# Patient Record
Sex: FEMALE | Race: BLACK OR AFRICAN AMERICAN | NOT HISPANIC OR LATINO
[De-identification: names, ages, dates, MRNs, and addresses within clinical notes are randomized per-mention and may not be internally consistent; named-entity substitution may affect disease eponyms.]

---

## 2017-01-09 ENCOUNTER — RECORD ABSTRACTING (OUTPATIENT)
Age: 62
End: 2017-01-09

## 2017-01-09 DIAGNOSIS — Z78.9 OTHER SPECIFIED HEALTH STATUS: ICD-10-CM

## 2017-01-17 ENCOUNTER — FORM ENCOUNTER (OUTPATIENT)
Age: 62
End: 2017-01-17

## 2017-01-18 ENCOUNTER — OTHER (OUTPATIENT)
Age: 62
End: 2017-01-18

## 2017-01-18 ENCOUNTER — APPOINTMENT (OUTPATIENT)
Dept: INTERNAL MEDICINE | Facility: CLINIC | Age: 62
End: 2017-01-18

## 2017-01-18 VITALS
HEART RATE: 61 BPM | TEMPERATURE: 96.7 F | WEIGHT: 203 LBS | HEIGHT: 63 IN | BODY MASS INDEX: 35.97 KG/M2 | DIASTOLIC BLOOD PRESSURE: 89 MMHG | RESPIRATION RATE: 17 BRPM | SYSTOLIC BLOOD PRESSURE: 138 MMHG

## 2017-01-19 LAB
ALBUMIN SERPL-MCNC: 3.8 G/DL
ALBUMIN/GLOB SERPL: 1.27
ALP SERPL-CCNC: 69 IU/L
ALT SERPL-CCNC: 12 IU/L
ANION GAP SERPL CALC-SCNC: 7 MEQ/L
AST SERPL-CCNC: 21 IU/L
BASOPHILS # BLD: 0.02 TH/MM3
BASOPHILS NFR BLD: 0.5 %
BILIRUB SERPL-MCNC: 0.6 MG/DL
BUN SERPL-MCNC: 15 MG/DL
BUN/CREAT SERPL: 19 %
CALCIUM SERPL-MCNC: 9.3 MG/DL
CHLORIDE SERPL-SCNC: 104 MEQ/L
CHOLEST SERPL-MCNC: 256 MG/DL
CO2 SERPL-SCNC: 28 MEQ/L
CREAT SERPL-MCNC: 0.79 MG/DL
EOSINOPHIL # BLD: 0.06 TH/MM3
EOSINOPHIL NFR BLD: 1.4 %
ERYTHROCYTE [DISTWIDTH] IN BLOOD BY AUTOMATED COUNT: 17.3 %
ESTIMATED AVERGAGE GLUCOSE (NORTH): 134 MG/DL
GFR SERPL CREATININE-BSD FRML MDRD: 89
GLUCOSE SERPL-MCNC: 90 MG/DL
GRANULOCYTES # BLD: 1.32 TH/MM3
GRANULOCYTES NFR BLD: 31.2 %
HBA1C MFR BLD: 6.3 %
HCT VFR BLD AUTO: 35.3 %
HDLC SERPL-MCNC: 55 MG/DL
HDLC SERPL: 4.65
HGB BLD-MCNC: 10.5 G/DL
IMM GRANULOCYTES # BLD: 0.01 TH/MM3
IMM GRANULOCYTES NFR BLD: 0.2 %
LDLC SERPL DIRECT ASSAY-MCNC: 176 MG/DL
LYMPHOCYTES # BLD: 2.4 TH/MM3
LYMPHOCYTES NFR BLD: 56.6 %
MCH RBC QN AUTO: 21.1 PG
MCHC RBC AUTO-ENTMCNC: 29.7 G/DL
MCV RBC AUTO: 70.9 FL
MONOCYTES # BLD: 0.43 TH/MM3
MONOCYTES NFR BLD: 10.1 %
PLATELET # BLD: 259 TH/MM3
PMV BLD AUTO: 12 FL
POTASSIUM SERPL-SCNC: 4.6 MMOL/L
PROT SERPL-MCNC: 6.8 G/DL
RBC # BLD AUTO: 4.98 MIL/MM3
SODIUM SERPL-SCNC: 139 MEQ/L
TRIGL SERPL-MCNC: 78 MG/DL
VLDLC SERPL-MCNC: 15 MG/DL
WBC # BLD: 4.24 TH/MM3

## 2017-01-23 VITALS — WEIGHT: 198 LBS | BODY MASS INDEX: 35.07 KG/M2

## 2017-01-25 ENCOUNTER — CLINICAL ADVICE (OUTPATIENT)
Age: 62
End: 2017-01-25

## 2017-01-30 ENCOUNTER — FORM ENCOUNTER (OUTPATIENT)
Age: 62
End: 2017-01-30

## 2017-02-17 ENCOUNTER — OUTPATIENT (OUTPATIENT)
Dept: OUTPATIENT SERVICES | Facility: HOSPITAL | Age: 62
LOS: 1 days | Discharge: HOME | End: 2017-02-17

## 2017-02-17 ENCOUNTER — APPOINTMENT (OUTPATIENT)
Dept: OBGYN | Facility: CLINIC | Age: 62
End: 2017-02-17

## 2017-02-17 VITALS
HEIGHT: 61 IN | SYSTOLIC BLOOD PRESSURE: 134 MMHG | DIASTOLIC BLOOD PRESSURE: 90 MMHG | BODY MASS INDEX: 38.14 KG/M2 | WEIGHT: 202 LBS

## 2017-02-21 ENCOUNTER — APPOINTMENT (OUTPATIENT)
Dept: INTERNAL MEDICINE | Facility: CLINIC | Age: 62
End: 2017-02-21

## 2017-02-28 ENCOUNTER — APPOINTMENT (OUTPATIENT)
Dept: ANTEPARTUM | Facility: CLINIC | Age: 62
End: 2017-02-28

## 2017-03-07 ENCOUNTER — APPOINTMENT (OUTPATIENT)
Dept: OBGYN | Facility: CLINIC | Age: 62
End: 2017-03-07

## 2017-03-07 VITALS
DIASTOLIC BLOOD PRESSURE: 68 MMHG | HEIGHT: 60 IN | BODY MASS INDEX: 39.46 KG/M2 | WEIGHT: 201 LBS | SYSTOLIC BLOOD PRESSURE: 118 MMHG

## 2017-03-12 LAB
A VAGINAE DNA VAG NAA+PROBE-LOG#: NOT DETECTED
BV SCORE VAG QL NAA+PROBE: NORMAL
C GLABRATA DNA VAG QL NAA+PROBE: NOT DETECTED
C PARAP DNA VAG QL NAA+PROBE: NOT DETECTED
C TRACH RRNA SPEC QL NAA+PROBE: NOT DETECTED
C TROPICLS DNA VAG QL NAA+PROBE: DETECTED
CANDIDA DNA VAG QL NAA+PROBE: NOT DETECTED
G VAGINALIS DNA VAG NAA+PROBE-LOG#: NOT DETECTED
LACTOBACILLUS DNA VAG NAA+PROBE-LOG#: NOT DETECTED
MEGASPHAERA SP DNA VAG NAA+PROBE-LOG#: NOT DETECTED
N GONORRHOEA RRNA SPEC QL NAA+PROBE: NOT DETECTED
T VAGINALIS RRNA SPEC QL NAA+PROBE: NOT DETECTED

## 2017-04-25 ENCOUNTER — APPOINTMENT (OUTPATIENT)
Dept: NUTRITION | Facility: CLINIC | Age: 62
End: 2017-04-25

## 2017-06-02 ENCOUNTER — APPOINTMENT (OUTPATIENT)
Dept: UROGYNECOLOGY | Facility: CLINIC | Age: 62
End: 2017-06-02

## 2017-06-02 ENCOUNTER — OUTPATIENT (OUTPATIENT)
Dept: OUTPATIENT SERVICES | Facility: HOSPITAL | Age: 62
LOS: 1 days | Discharge: HOME | End: 2017-06-02

## 2017-06-02 VITALS
DIASTOLIC BLOOD PRESSURE: 86 MMHG | BODY MASS INDEX: 37.95 KG/M2 | SYSTOLIC BLOOD PRESSURE: 140 MMHG | HEIGHT: 61 IN | WEIGHT: 201 LBS

## 2017-06-02 DIAGNOSIS — E66.9 OBESITY, UNSPECIFIED: ICD-10-CM

## 2017-06-02 DIAGNOSIS — N81.6 RECTOCELE: ICD-10-CM

## 2017-06-02 DIAGNOSIS — N81.10 CYSTOCELE, UNSPECIFIED: ICD-10-CM

## 2017-06-02 DIAGNOSIS — M79.1 MYALGIA: ICD-10-CM

## 2017-06-02 DIAGNOSIS — R39.14 FEELING OF INCOMPLETE BLADDER EMPTYING: ICD-10-CM

## 2017-06-02 DIAGNOSIS — R35.1 NOCTURIA: ICD-10-CM

## 2017-06-02 DIAGNOSIS — M48.00 SPINAL STENOSIS, SITE UNSPECIFIED: ICD-10-CM

## 2017-06-02 DIAGNOSIS — R33.9 RETENTION OF URINE, UNSPECIFIED: ICD-10-CM

## 2017-06-02 LAB — CYTOLOGY CVX/VAG DOC THIN PREP: NORMAL

## 2017-06-06 LAB
APPEARANCE UR: CLEAR
BACTERIA UR CULT: NORMAL
BILIRUB UR QL STRIP: NEGATIVE
COLOR UR: YELLOW
GLUCOSE UR STRIP-MCNC: NEGATIVE MG/DL
HGB UR QL STRIP: NEGATIVE
KETONES UR STRIP-MCNC: NEGATIVE MG/DL
NITRITE UR QL STRIP: NEGATIVE
PH UR STRIP: 7
PROT UR STRIP-MCNC: NEGATIVE MG/DL
SP GR UR STRIP: 1.01
UROBILINOGEN UR STRIP-MCNC: 0.2 MG/DL
WBC URNS QL MICRO: NEGATIVE

## 2017-06-08 LAB
C TRACH RRNA SPEC QL NAA+PROBE: NOT DETECTED
N GONORRHOEA RRNA SPEC QL NAA+PROBE: NOT DETECTED

## 2017-07-05 ENCOUNTER — OTHER (OUTPATIENT)
Age: 62
End: 2017-07-05

## 2017-07-13 ENCOUNTER — OUTPATIENT (OUTPATIENT)
Dept: OUTPATIENT SERVICES | Facility: HOSPITAL | Age: 62
LOS: 1 days | Discharge: HOME | End: 2017-07-13

## 2017-07-13 DIAGNOSIS — R33.9 RETENTION OF URINE, UNSPECIFIED: ICD-10-CM

## 2017-07-15 ENCOUNTER — INPATIENT (INPATIENT)
Facility: HOSPITAL | Age: 62
LOS: 0 days | Discharge: HOME | End: 2017-07-16
Attending: EMERGENCY MEDICINE

## 2017-07-15 DIAGNOSIS — E66.9 OBESITY, UNSPECIFIED: ICD-10-CM

## 2017-07-15 DIAGNOSIS — E78.5 HYPERLIPIDEMIA, UNSPECIFIED: ICD-10-CM

## 2017-07-15 DIAGNOSIS — M54.9 DORSALGIA, UNSPECIFIED: ICD-10-CM

## 2017-07-15 DIAGNOSIS — G89.29 OTHER CHRONIC PAIN: ICD-10-CM

## 2017-07-15 DIAGNOSIS — R07.9 CHEST PAIN, UNSPECIFIED: ICD-10-CM

## 2017-07-15 DIAGNOSIS — K59.00 CONSTIPATION, UNSPECIFIED: ICD-10-CM

## 2017-07-15 DIAGNOSIS — R07.89 OTHER CHEST PAIN: ICD-10-CM

## 2017-07-15 DIAGNOSIS — D64.9 ANEMIA, UNSPECIFIED: ICD-10-CM

## 2017-07-15 DIAGNOSIS — I10 ESSENTIAL (PRIMARY) HYPERTENSION: ICD-10-CM

## 2017-07-26 ENCOUNTER — OUTPATIENT (OUTPATIENT)
Dept: OUTPATIENT SERVICES | Facility: HOSPITAL | Age: 62
LOS: 1 days | Discharge: HOME | End: 2017-07-26

## 2017-07-26 ENCOUNTER — APPOINTMENT (OUTPATIENT)
Dept: UROGYNECOLOGY | Facility: CLINIC | Age: 62
End: 2017-07-26

## 2017-07-26 ENCOUNTER — RESULT REVIEW (OUTPATIENT)
Age: 62
End: 2017-07-26

## 2017-07-26 VITALS
BODY MASS INDEX: 37.95 KG/M2 | SYSTOLIC BLOOD PRESSURE: 138 MMHG | WEIGHT: 201 LBS | HEIGHT: 61 IN | DIASTOLIC BLOOD PRESSURE: 78 MMHG

## 2017-07-26 RX ORDER — PREGABALIN 150 MG/1
150 CAPSULE ORAL
Refills: 0 | Status: DISCONTINUED | COMMUNITY
End: 2017-07-26

## 2017-07-26 RX ORDER — PREGABALIN 150 MG/1
150 CAPSULE ORAL 3 TIMES DAILY
Qty: 90 | Refills: 0 | Status: DISCONTINUED | COMMUNITY
Start: 2017-01-18 | End: 2017-07-26

## 2017-08-02 LAB
APPEARANCE UR: CLEAR
BACTERIA UR CULT: NORMAL
BILIRUB UR QL STRIP: NEGATIVE
COLOR UR: YELLOW
GLUCOSE UR STRIP-MCNC: NEGATIVE MG/DL
HGB UR QL STRIP: NEGATIVE
KETONES UR STRIP-MCNC: NEGATIVE MG/DL
NITRITE UR QL STRIP: NEGATIVE
PH UR STRIP: 6.5
PROT UR STRIP-MCNC: NEGATIVE MG/DL
SP GR UR STRIP: 1.01
UROBILINOGEN UR STRIP-MCNC: 0.2 MG/DL
WBC URNS QL MICRO: NEGATIVE

## 2017-08-03 DIAGNOSIS — N81.10 CYSTOCELE, UNSPECIFIED: ICD-10-CM

## 2017-08-03 DIAGNOSIS — R10.2 PELVIC AND PERINEAL PAIN: ICD-10-CM

## 2017-08-03 DIAGNOSIS — M79.1 MYALGIA: ICD-10-CM

## 2017-08-03 DIAGNOSIS — R39.14 FEELING OF INCOMPLETE BLADDER EMPTYING: ICD-10-CM

## 2017-08-03 DIAGNOSIS — N81.6 RECTOCELE: ICD-10-CM

## 2018-01-02 ENCOUNTER — APPOINTMENT (OUTPATIENT)
Dept: OBGYN | Facility: CLINIC | Age: 63
End: 2018-01-02

## 2018-01-02 ENCOUNTER — OUTPATIENT (OUTPATIENT)
Dept: OUTPATIENT SERVICES | Facility: HOSPITAL | Age: 63
LOS: 1 days | Discharge: HOME | End: 2018-01-02

## 2018-01-02 VITALS
SYSTOLIC BLOOD PRESSURE: 120 MMHG | BODY MASS INDEX: 38.54 KG/M2 | DIASTOLIC BLOOD PRESSURE: 78 MMHG | HEIGHT: 60 IN | WEIGHT: 196.3 LBS

## 2018-01-10 ENCOUNTER — OUTPATIENT (OUTPATIENT)
Dept: OUTPATIENT SERVICES | Facility: HOSPITAL | Age: 63
LOS: 1 days | Discharge: HOME | End: 2018-01-10

## 2018-01-10 ENCOUNTER — APPOINTMENT (OUTPATIENT)
Dept: INTERNAL MEDICINE | Facility: CLINIC | Age: 63
End: 2018-01-10

## 2018-01-10 VITALS
WEIGHT: 194 LBS | HEART RATE: 70 BPM | DIASTOLIC BLOOD PRESSURE: 80 MMHG | SYSTOLIC BLOOD PRESSURE: 136 MMHG | HEIGHT: 60 IN | BODY MASS INDEX: 38.09 KG/M2

## 2018-01-12 LAB
ANA TITR SER: NEGATIVE
BASOPHILS # BLD: 0.01 TH/MM3
BASOPHILS NFR BLD: 0.3 %
CHOLEST SERPL-MCNC: 231 MG/DL
CRP SERPL-MCNC: 1.8 MG/DL
DIFFERENTIAL METHOD BLD: NORMAL
EOSINOPHIL # BLD: 0.07 TH/MM3
EOSINOPHIL NFR BLD: 2 %
ERYTHROCYTE [DISTWIDTH] IN BLOOD BY AUTOMATED COUNT: 16.7 %
ERYTHROCYTE [SEDIMENTATION RATE] IN BLOOD: 33 MM/HR
ESTIMATED AVERGAGE GLUCOSE (NORTH): 128 MG/DL
FERRITIN SERPL-MCNC: 55 NG/ML
GRANULOCYTES # BLD: 1.05 TH/MM3
GRANULOCYTES NFR BLD: 30.6 %
HBA1C MFR BLD: 6.1 %
HCT VFR BLD AUTO: 35.7 %
HDLC SERPL-MCNC: 53 MG/DL
HDLC SERPL: 4.36
HGB BLD-MCNC: 11 G/DL
IMM GRANULOCYTES # BLD: 0 TH/MM3
IMM GRANULOCYTES NFR BLD: 0 %
IRON SERPL-MCNC: 68 UG/DL
LDLC SERPL DIRECT ASSAY-MCNC: 162 MG/DL
LYMPHOCYTES # BLD: 2.04 TH/MM3
LYMPHOCYTES NFR BLD: 59.3 %
MCH RBC QN AUTO: 21.9 PG
MCHC RBC AUTO-ENTMCNC: 30.8 G/DL
MCV RBC AUTO: 71.1 FL
MONOCYTES # BLD: 0.27 TH/MM3
MONOCYTES NFR BLD: 7.8 %
PLATELET # BLD: 257 TH/MM3
PMV BLD AUTO: 11.7 FL
RBC # BLD AUTO: 5.02 MIL/MM3
RHEUMATOID FACT SERPL-ACNC: < 14 IU/ML
TIBC SERPL-MCNC: 336 UG/DL
TRIGL SERPL-MCNC: 61 MG/DL
VLDLC SERPL-MCNC: 12 MG/DL
WBC # BLD: 3.44 TH/MM3

## 2018-01-26 ENCOUNTER — OUTPATIENT (OUTPATIENT)
Dept: OUTPATIENT SERVICES | Facility: HOSPITAL | Age: 63
LOS: 1 days | Discharge: HOME | End: 2018-01-26

## 2018-01-30 ENCOUNTER — APPOINTMENT (OUTPATIENT)
Dept: NUTRITION | Facility: CLINIC | Age: 63
End: 2018-01-30

## 2018-02-02 ENCOUNTER — OUTPATIENT (OUTPATIENT)
Dept: OUTPATIENT SERVICES | Facility: HOSPITAL | Age: 63
LOS: 1 days | Discharge: HOME | End: 2018-02-02

## 2018-02-02 DIAGNOSIS — Z12.31 ENCOUNTER FOR SCREENING MAMMOGRAM FOR MALIGNANT NEOPLASM OF BREAST: ICD-10-CM

## 2018-02-07 ENCOUNTER — OUTPATIENT (OUTPATIENT)
Dept: OUTPATIENT SERVICES | Facility: HOSPITAL | Age: 63
LOS: 1 days | Discharge: HOME | End: 2018-02-07

## 2018-02-07 DIAGNOSIS — R92.8 OTHER ABNORMAL AND INCONCLUSIVE FINDINGS ON DIAGNOSTIC IMAGING OF BREAST: ICD-10-CM

## 2018-02-20 DIAGNOSIS — R10.2 PELVIC AND PERINEAL PAIN: ICD-10-CM

## 2018-03-20 DIAGNOSIS — M48.061 SPINAL STENOSIS, LUMBAR REGION WITHOUT NEUROGENIC CLAUDICATION: ICD-10-CM

## 2018-07-25 ENCOUNTER — APPOINTMENT (OUTPATIENT)
Dept: OBGYN | Facility: CLINIC | Age: 63
End: 2018-07-25

## 2018-07-25 ENCOUNTER — OUTPATIENT (OUTPATIENT)
Dept: OUTPATIENT SERVICES | Facility: HOSPITAL | Age: 63
LOS: 1 days | Discharge: HOME | End: 2018-07-25

## 2018-07-25 VITALS
SYSTOLIC BLOOD PRESSURE: 130 MMHG | BODY MASS INDEX: 37.99 KG/M2 | WEIGHT: 193.5 LBS | HEIGHT: 60 IN | DIASTOLIC BLOOD PRESSURE: 86 MMHG

## 2018-07-25 DIAGNOSIS — Z82.49 FAMILY HISTORY OF ISCHEMIC HEART DISEASE AND OTHER DISEASES OF THE CIRCULATORY SYSTEM: ICD-10-CM

## 2018-07-25 RX ORDER — MELOXICAM 15 MG/1
15 TABLET ORAL
Qty: 14 | Refills: 0 | Status: COMPLETED | COMMUNITY
Start: 2018-01-10 | End: 2018-07-25

## 2018-07-26 DIAGNOSIS — N81.6 RECTOCELE: ICD-10-CM

## 2018-07-26 DIAGNOSIS — N81.10 CYSTOCELE, UNSPECIFIED: ICD-10-CM

## 2018-08-03 ENCOUNTER — APPOINTMENT (OUTPATIENT)
Dept: UROGYNECOLOGY | Facility: CLINIC | Age: 63
End: 2018-08-03

## 2018-08-03 ENCOUNTER — OUTPATIENT (OUTPATIENT)
Dept: OUTPATIENT SERVICES | Facility: HOSPITAL | Age: 63
LOS: 1 days | Discharge: HOME | End: 2018-08-03

## 2018-08-03 VITALS
SYSTOLIC BLOOD PRESSURE: 142 MMHG | WEIGHT: 193 LBS | HEIGHT: 60 IN | BODY MASS INDEX: 37.89 KG/M2 | DIASTOLIC BLOOD PRESSURE: 82 MMHG

## 2018-08-03 DIAGNOSIS — N81.10 CYSTOCELE, UNSPECIFIED: ICD-10-CM

## 2018-08-03 DIAGNOSIS — Z87.898 PERSONAL HISTORY OF OTHER SPECIFIED CONDITIONS: ICD-10-CM

## 2018-08-03 DIAGNOSIS — R10.2 PELVIC AND PERINEAL PAIN: ICD-10-CM

## 2018-08-03 DIAGNOSIS — N94.9 UNSPECIFIED CONDITION ASSOCIATED WITH FEMALE GENITAL ORGANS AND MENSTRUAL CYCLE: ICD-10-CM

## 2018-08-03 DIAGNOSIS — R33.9 RETENTION OF URINE, UNSPECIFIED: ICD-10-CM

## 2018-08-10 DIAGNOSIS — N88.9 NONINFLAMMATORY DISORDER OF CERVIX UTERI, UNSPECIFIED: ICD-10-CM

## 2018-08-10 DIAGNOSIS — M79.1 MYALGIA: ICD-10-CM

## 2018-08-10 DIAGNOSIS — K59.00 CONSTIPATION, UNSPECIFIED: ICD-10-CM

## 2018-08-10 DIAGNOSIS — N81.6 RECTOCELE: ICD-10-CM

## 2018-09-14 ENCOUNTER — APPOINTMENT (OUTPATIENT)
Dept: UROGYNECOLOGY | Facility: CLINIC | Age: 63
End: 2018-09-14

## 2018-09-14 ENCOUNTER — OUTPATIENT (OUTPATIENT)
Dept: OUTPATIENT SERVICES | Facility: HOSPITAL | Age: 63
LOS: 1 days | Discharge: HOME | End: 2018-09-14

## 2018-09-14 VITALS — BODY MASS INDEX: 37.69 KG/M2 | DIASTOLIC BLOOD PRESSURE: 78 MMHG | WEIGHT: 193 LBS | SYSTOLIC BLOOD PRESSURE: 128 MMHG

## 2018-09-14 RX ORDER — CONJUGATED ESTROGENS 0.62 MG/G
0.62 CREAM VAGINAL
Qty: 1 | Refills: 0 | Status: DISCONTINUED | COMMUNITY
Start: 2018-01-02 | End: 2018-09-14

## 2018-09-19 DIAGNOSIS — N95.2 POSTMENOPAUSAL ATROPHIC VAGINITIS: ICD-10-CM

## 2018-09-19 DIAGNOSIS — N85.8 OTHER SPECIFIED NONINFLAMMATORY DISORDERS OF UTERUS: ICD-10-CM

## 2018-09-19 DIAGNOSIS — M79.1 MYALGIA: ICD-10-CM

## 2018-09-21 RX ORDER — CONJUGATED ESTROGENS 0.62 MG/G
0.62 CREAM VAGINAL
Qty: 1 | Refills: 3 | Status: DISCONTINUED | COMMUNITY
Start: 2018-09-14 | End: 2018-09-21

## 2018-10-31 ENCOUNTER — OUTPATIENT (OUTPATIENT)
Dept: OUTPATIENT SERVICES | Facility: HOSPITAL | Age: 63
LOS: 1 days | Discharge: HOME | End: 2018-10-31

## 2018-10-31 ENCOUNTER — APPOINTMENT (OUTPATIENT)
Dept: UROGYNECOLOGY | Facility: CLINIC | Age: 63
End: 2018-10-31

## 2018-10-31 VITALS
SYSTOLIC BLOOD PRESSURE: 124 MMHG | HEIGHT: 60 IN | BODY MASS INDEX: 38.28 KG/M2 | WEIGHT: 195 LBS | DIASTOLIC BLOOD PRESSURE: 70 MMHG

## 2018-10-31 DIAGNOSIS — N95.2 POSTMENOPAUSAL ATROPHIC VAGINITIS: ICD-10-CM

## 2018-10-31 DIAGNOSIS — R10.2 PELVIC AND PERINEAL PAIN: ICD-10-CM

## 2018-10-31 DIAGNOSIS — M79.10 MYALGIA, UNSPECIFIED SITE: ICD-10-CM

## 2018-11-19 ENCOUNTER — OUTPATIENT (OUTPATIENT)
Dept: OUTPATIENT SERVICES | Facility: HOSPITAL | Age: 63
LOS: 1 days | Discharge: HOME | End: 2018-11-19

## 2018-11-19 ENCOUNTER — APPOINTMENT (OUTPATIENT)
Dept: INTERNAL MEDICINE | Facility: CLINIC | Age: 63
End: 2018-11-19

## 2018-11-19 VITALS
WEIGHT: 197 LBS | HEART RATE: 94 BPM | HEIGHT: 63 IN | BODY MASS INDEX: 34.91 KG/M2 | DIASTOLIC BLOOD PRESSURE: 79 MMHG | SYSTOLIC BLOOD PRESSURE: 137 MMHG

## 2018-11-19 DIAGNOSIS — R03.0 ELEVATED BLOOD-PRESSURE READING, WITHOUT DIAGNOSIS OF HYPERTENSION: ICD-10-CM

## 2018-11-19 DIAGNOSIS — R73.9 HYPERGLYCEMIA, UNSPECIFIED: ICD-10-CM

## 2018-11-19 DIAGNOSIS — Z23 ENCOUNTER FOR IMMUNIZATION: ICD-10-CM

## 2018-11-19 DIAGNOSIS — M54.5 LOW BACK PAIN: ICD-10-CM

## 2018-11-19 DIAGNOSIS — E66.9 OBESITY, UNSPECIFIED: ICD-10-CM

## 2018-11-19 NOTE — ASSESSMENT
[FreeTextEntry1] : 63 year old female is here for her follow up visit\par \par # Chronic BAck pain \par - Pt takes tylenol\par - c/w  cyclobenzarine\par - Pain management consult \par - Pt received EDSI in that but didn’t help much follow up\par - xray of L/S - moderate lower lumbar degenerative disease with facet arthrosis \par -mri LS\par - Physical therapy \par \par # Chronic joint pain\par - rheumatological work up negative\par \par # Hyperglycemia\par - HBa1C 6.1 in jan 2018- repeat blood work\par \par # HLD and Hypertriglyceridemia - last lipid profile in Jan 2018 - normal lipids\par -repeat  Lipid profile\par \par # Anemia\par - repeat iron studies and cbc\par \par #obesity- nutrition f/u \par # Heathcare maintenance \par - PAp smear 2016\par - mammogram 2017 birads 1 \par -flu shot today \par -colonoscopy 2016 reported normal\par \par # Dysuria \par - Patient had uterine prolapse and pt had pessary that wasn't cleaned so it was removed in the past. \par - f/u gyn \par #elevated BP - monitor BP at home- BP monitor kit sent to pharmacy- keep a diary and rtc 3 months\par #heart burn- GI consult\par protonix in morning\par ranitidine at night \par \par patient requesting home care forms -  consulted \par RTC 1 month

## 2018-11-19 NOTE — PHYSICAL EXAM
[No Acute Distress] : no acute distress [Well Nourished] : well nourished [Normal Sclera/Conjunctiva] : normal sclera/conjunctiva [Normal Outer Ear/Nose] : the outer ears and nose were normal in appearance [No Lymphadenopathy] : no lymphadenopathy [No Respiratory Distress] : no respiratory distress  [Clear to Auscultation] : lungs were clear to auscultation bilaterally [Normal Rate] : normal rate  [Regular Rhythm] : with a regular rhythm [No Edema] : there was no peripheral edema [Soft] : abdomen soft [Non Tender] : non-tender [Normal Bowel Sounds] : normal bowel sounds [No Joint Swelling] : no joint swelling [No Rash] : no rash [No Focal Deficits] : no focal deficits [Normal Affect] : the affect was normal [Supple] : supple [Normal S1, S2] : normal S1 and S2 [Non-distended] : non-distended [de-identified] : spinal tenderness +

## 2018-11-19 NOTE — HISTORY OF PRESENT ILLNESS
[de-identified] : 62 y/o female is here for her follow up visit. Still c/o back pain and urinary frequency .No fever , chills. Also c/o heart burn x couple of months a/w decrease appetite and intermittent episodes of vomiting ,non bloody, non bilious .No significant weight loss

## 2018-11-21 ENCOUNTER — APPOINTMENT (OUTPATIENT)
Dept: ANTEPARTUM | Facility: CLINIC | Age: 63
End: 2018-11-21

## 2018-12-03 ENCOUNTER — APPOINTMENT (OUTPATIENT)
Dept: NUTRITION | Facility: CLINIC | Age: 63
End: 2018-12-03

## 2018-12-03 VITALS — HEIGHT: 63 IN | WEIGHT: 199 LBS | BODY MASS INDEX: 35.26 KG/M2

## 2018-12-04 LAB
ALBUMIN SERPL ELPH-MCNC: 3.8 G/DL
ALP BLD-CCNC: 76 U/L
ALT SERPL-CCNC: 11 U/L
ANION GAP SERPL CALC-SCNC: 12 MMOL/L
AST SERPL-CCNC: 18 U/L
BASOPHILS # BLD AUTO: 0.01 K/UL
BASOPHILS NFR BLD AUTO: 0.2 %
BILIRUB SERPL-MCNC: <0.2 MG/DL
BUN SERPL-MCNC: 11 MG/DL
CALCIUM SERPL-MCNC: 8.8 MG/DL
CHLORIDE SERPL-SCNC: 104 MMOL/L
CHOLEST SERPL-MCNC: 207 MG/DL
CHOLEST/HDLC SERPL: 4.9 RATIO
CO2 SERPL-SCNC: 27 MMOL/L
CREAT SERPL-MCNC: 1 MG/DL
EOSINOPHIL # BLD AUTO: 0.08 K/UL
EOSINOPHIL NFR BLD AUTO: 1.7 %
ESTIMATED AVERAGE GLUCOSE: 117 MG/DL
GLUCOSE SERPL-MCNC: 97 MG/DL
HBA1C MFR BLD HPLC: 5.7 %
HCT VFR BLD CALC: 33.8 %
HDLC SERPL-MCNC: 42 MG/DL
HGB BLD-MCNC: 10 G/DL
IMM GRANULOCYTES NFR BLD AUTO: 0.2 %
LDLC SERPL CALC-MCNC: 158 MG/DL
LYMPHOCYTES # BLD AUTO: 1.94 K/UL
LYMPHOCYTES NFR BLD AUTO: 41 %
MAN DIFF?: NORMAL
MCHC RBC-ENTMCNC: 22.2 PG
MCHC RBC-ENTMCNC: 29.6 G/DL
MCV RBC AUTO: 74.9 FL
MONOCYTES # BLD AUTO: 0.48 K/UL
MONOCYTES NFR BLD AUTO: 10.1 %
NEUTROPHILS # BLD AUTO: 2.21 K/UL
NEUTROPHILS NFR BLD AUTO: 46.8 %
PLATELET # BLD AUTO: 270 K/UL
POTASSIUM SERPL-SCNC: 4.5 MMOL/L
PROT SERPL-MCNC: 6.5 G/DL
RBC # BLD: 4.51 M/UL
RBC # FLD: 16.2 %
SODIUM SERPL-SCNC: 143 MMOL/L
TRIGL SERPL-MCNC: 118 MG/DL
WBC # FLD AUTO: 4.73 K/UL

## 2018-12-12 ENCOUNTER — APPOINTMENT (OUTPATIENT)
Dept: PAIN MANAGEMENT | Facility: CLINIC | Age: 63
End: 2018-12-12

## 2018-12-19 ENCOUNTER — APPOINTMENT (OUTPATIENT)
Dept: INTERNAL MEDICINE | Facility: CLINIC | Age: 63
End: 2018-12-19

## 2018-12-19 ENCOUNTER — OUTPATIENT (OUTPATIENT)
Dept: OUTPATIENT SERVICES | Facility: HOSPITAL | Age: 63
LOS: 1 days | Discharge: HOME | End: 2018-12-19

## 2018-12-19 VITALS
DIASTOLIC BLOOD PRESSURE: 81 MMHG | SYSTOLIC BLOOD PRESSURE: 121 MMHG | HEART RATE: 75 BPM | TEMPERATURE: 96.9 F | WEIGHT: 197 LBS | HEIGHT: 63 IN | BODY MASS INDEX: 34.91 KG/M2

## 2018-12-19 DIAGNOSIS — R03.0 ELEVATED BLOOD-PRESSURE READING, W/OUT DIAGNOSIS OF HYPERTENSION: ICD-10-CM

## 2018-12-19 NOTE — ASSESSMENT
[FreeTextEntry1] : 63 year old female is here for her follow up visit\par \par # Chronic BAck pain \par - Pt takes tylenol\par - c/w cyclobenzarine\par - Pain management consult \par - Pt received EDSI in that but didn’t help much follow up\par - xray of L/S - moderate lower lumbar degenerative disease with facet arthrosis \par - MRi was not done. Follow up. \par - Physical therapy \par - Follow up after an MRI \par \par # Hyperglycemia\par - HbA1C 5.7 \par \par # Microcytic Anemia\par - Iron studies were done\par - GI follow up \par - start Iron sulfate \par \par # obesity- nutrition f/u \par \par # Heathcare maintenance \par - Immunization UTP\par - Pap smear in 2016 \par - colonoscopy 2016 reported normal\par \par # Dysuria \par - Patient had uterine prolapse and pt had pessary that wasn't cleaned so it was removed in the past. \par - f/u gyn \par \par # Blood pressure\par - Normal and controlled \par \par #heart burn- GI consult\par protonix in morning\par ranitidine at night \par \par patient requesting home care forms -  consulted \par RTC 1 month.

## 2018-12-19 NOTE — HISTORY OF PRESENT ILLNESS
[de-identified] : 64 y/o female is here for her follow up visit. . She is still c/o back pain that is band like and radiates to legs. She did not have the MRI done. SHe did not see the physical therapy.

## 2018-12-19 NOTE — PHYSICAL EXAM
[No Acute Distress] : no acute distress [Well Nourished] : well nourished [Normal Sclera/Conjunctiva] : normal sclera/conjunctiva [Supple] : supple [Clear to Auscultation] : lungs were clear to auscultation bilaterally [Normal Rate] : normal rate  [Regular Rhythm] : with a regular rhythm [Normal S1, S2] : normal S1 and S2 [Soft] : abdomen soft [Non Tender] : non-tender [Non-distended] : non-distended [No HSM] : no HSM [Normal Bowel Sounds] : normal bowel sounds

## 2018-12-23 ENCOUNTER — FORM ENCOUNTER (OUTPATIENT)
Age: 63
End: 2018-12-23

## 2018-12-24 ENCOUNTER — OUTPATIENT (OUTPATIENT)
Dept: OUTPATIENT SERVICES | Facility: HOSPITAL | Age: 63
LOS: 1 days | Discharge: HOME | End: 2018-12-24

## 2018-12-24 DIAGNOSIS — M54.5 LOW BACK PAIN: ICD-10-CM

## 2019-01-04 ENCOUNTER — APPOINTMENT (OUTPATIENT)
Dept: GASTROENTEROLOGY | Facility: CLINIC | Age: 64
End: 2019-01-04

## 2019-01-09 ENCOUNTER — APPOINTMENT (OUTPATIENT)
Dept: PAIN MANAGEMENT | Facility: CLINIC | Age: 64
End: 2019-01-09

## 2019-04-01 ENCOUNTER — OUTPATIENT (OUTPATIENT)
Dept: OUTPATIENT SERVICES | Facility: HOSPITAL | Age: 64
LOS: 1 days | End: 2019-04-01
Payer: MEDICAID

## 2019-04-01 PROCEDURE — G9001: CPT

## 2019-04-17 DIAGNOSIS — Z71.89 OTHER SPECIFIED COUNSELING: ICD-10-CM

## 2019-05-01 ENCOUNTER — APPOINTMENT (OUTPATIENT)
Dept: UROGYNECOLOGY | Facility: CLINIC | Age: 64
End: 2019-05-01
Payer: MEDICAID

## 2019-05-01 ENCOUNTER — OUTPATIENT (OUTPATIENT)
Dept: OUTPATIENT SERVICES | Facility: HOSPITAL | Age: 64
LOS: 1 days | Discharge: HOME | End: 2019-05-01

## 2019-05-01 VITALS
HEIGHT: 63 IN | DIASTOLIC BLOOD PRESSURE: 82 MMHG | BODY MASS INDEX: 34.91 KG/M2 | SYSTOLIC BLOOD PRESSURE: 126 MMHG | WEIGHT: 197 LBS

## 2019-05-01 PROCEDURE — 99213 OFFICE O/P EST LOW 20 MIN: CPT

## 2019-05-01 NOTE — DISCUSSION/SUMMARY
[FreeTextEntry1] : \par Myalgia-\par Prescribed Baclofen 20 mg BID.\par Precautions reviewed.\par Will return in 6 weeks for follow up or earlier if she has any issues.\par \par Vaginal Atrophy:\par Advised to continue to apply a pea size amount of the cream to the opening of the vagina three nights per week. \par

## 2019-05-01 NOTE — COUNSELING
[FreeTextEntry1] : \par Please see you primary care physician regarding pain/burning in stomach.\par \par If you feel like you have an infection it is important for you to call our office and we will arrange testing of your urine.\par \par Please begin taking Baclofen 20 mg, twice a day (pelvic pain). \par \par Please continue to apply a pea size amount of the cream to the opening of the vagina three nights per week. \par \par Please call my office if you have any issues with the cost or side effects of the medication. \par \par Schedule 6 week med check with my PA Eliel (Myalgia)

## 2019-05-01 NOTE — HISTORY OF PRESENT ILLNESS
[FreeTextEntry1] : \par Patient is here for 6 months med check for urge incontinence.\par Last seen on 10/31/18 for med check.\par \par s/p Flexeril 5 mg (no improvement)\par Flexeril 10 mg\par Estrace cream for atrophy\par Pelvic Ultrasound for pelvic pain: Endometrium 2 mm. Normal morphology.\par \par Today, patient states Flexeril 10 mg is not showing improvement. Continues to have pelvic pain. Also, She is experiencing burning Epigastric which is worsened with eating. Drinking fluids mildly alleviates the pain.\par \par Denies urinary symptoms.\par \par Patient would like to try a new muscle relaxant.\par \par

## 2019-05-01 NOTE — PHYSICAL EXAM
[No Acute Distress] : in no acute distress [Well Nourished] : ~L well nourished [Well developed] : well developed

## 2019-05-06 DIAGNOSIS — N95.2 POSTMENOPAUSAL ATROPHIC VAGINITIS: ICD-10-CM

## 2019-05-06 DIAGNOSIS — M79.10 MYALGIA, UNSPECIFIED SITE: ICD-10-CM

## 2019-06-12 ENCOUNTER — APPOINTMENT (OUTPATIENT)
Dept: UROGYNECOLOGY | Facility: CLINIC | Age: 64
End: 2019-06-12
Payer: MEDICAID

## 2019-06-12 ENCOUNTER — OUTPATIENT (OUTPATIENT)
Dept: OUTPATIENT SERVICES | Facility: HOSPITAL | Age: 64
LOS: 1 days | Discharge: HOME | End: 2019-06-12

## 2019-06-12 VITALS
HEIGHT: 63 IN | DIASTOLIC BLOOD PRESSURE: 76 MMHG | WEIGHT: 197 LBS | SYSTOLIC BLOOD PRESSURE: 126 MMHG | BODY MASS INDEX: 34.91 KG/M2

## 2019-06-12 PROCEDURE — 99213 OFFICE O/P EST LOW 20 MIN: CPT

## 2019-06-12 RX ORDER — BACLOFEN 20 MG/1
20 TABLET ORAL
Qty: 60 | Refills: 1 | Status: DISCONTINUED | COMMUNITY
Start: 2019-05-01 | End: 2019-06-12

## 2019-06-12 NOTE — COUNSELING
[FreeTextEntry1] : \par Please STOP taking Baclofen 20 mg.\par \par Please begin taking Gabapentin 100 mg at bedtime. It can make you sleepy\par \par Please continue to apply a pea size amount to the opening of the vagina three times a week. \par \par Please call my office if you have any issues with the cost or side effects of the medication. \par \par Schedule a 6 weeks follow up med check appointment with JACI Julian. (frequency/pelvic pain)

## 2019-06-12 NOTE — DISCUSSION/SUMMARY
[FreeTextEntry1] : \par Urginary Frequency-\par We reviewed the management options for interstitial cystitis/painful bladder syndrome, including dietary modification, over the counter medications for symptom relief, other medications including Elmiron, amitriptyline, Cymbalta, neurontin and hydroxyzine. We discussed that Elmiron is the only FDA approved medication for this indication. We also discussed that PT, acupuncture, and bladder installations can be helpful. Diagnostic(and possible therapeutic) options include cysto with hydrodistention.\par  \par Prescribed Gabapentin 100 mg at bedtime\par Precautions reviewed.\par Will return in 6 weeks for follow up or earlier if she has any issues.\par \par Atrophic Vaginitis:\par Advised to continue to apply a pea size amount of the cream to the opening of the vagina three nights per week.

## 2019-06-12 NOTE — HISTORY OF PRESENT ILLNESS
[FreeTextEntry1] : \par Patient is here for 6 weeks med check for myalgia.\par Last seen on 5/1/19 for med check.\par \par s/p Flexeril 5 mg (no improvement)\par s/p Flexeril 10 mg (no improvement)\par Baclofen 20 mg BID for myalgia\par Estrace cream for atrophy\par Pelvic Ultrasound for pelvic pain: Endometrium 2 mm. Normal morphology.\par \par Today, patient states she continues to have pelvic pain with Baclofen 20 mg BID. When she voids, pelvic pain is alleviated. Pain is aggravated when she needs to void.\par \par Denies side effects. Patient does not feel she has an infection.\par \par Patient is willing to try a new medication.

## 2019-06-18 DIAGNOSIS — N95.2 POSTMENOPAUSAL ATROPHIC VAGINITIS: ICD-10-CM

## 2019-06-18 DIAGNOSIS — R35.0 FREQUENCY OF MICTURITION: ICD-10-CM

## 2019-07-24 ENCOUNTER — OUTPATIENT (OUTPATIENT)
Dept: OUTPATIENT SERVICES | Facility: HOSPITAL | Age: 64
LOS: 1 days | Discharge: HOME | End: 2019-07-24

## 2019-07-24 ENCOUNTER — APPOINTMENT (OUTPATIENT)
Dept: UROGYNECOLOGY | Facility: CLINIC | Age: 64
End: 2019-07-24
Payer: MEDICAID

## 2019-07-24 VITALS
SYSTOLIC BLOOD PRESSURE: 118 MMHG | BODY MASS INDEX: 34.91 KG/M2 | HEIGHT: 63 IN | WEIGHT: 197 LBS | DIASTOLIC BLOOD PRESSURE: 72 MMHG

## 2019-07-24 PROCEDURE — 99213 OFFICE O/P EST LOW 20 MIN: CPT

## 2019-07-24 NOTE — HISTORY OF PRESENT ILLNESS
[FreeTextEntry1] : Accompanied by daughter:\par \par Patient is here for 6 weeks med check for frequency and pelvic pain.\par Last seen on 6/12/19 for med check.\par \par s/p Flexeril 5 mg (no improvement)\par s/p Flexeril 10 mg (no improvement)\par s/p Baclofen 20 mg (continued to have pain)\par Estrace cream for atrophy\par Gabapentin 100 mg for frequency\par \par GH 4 pB 3 TVL 8 D -7 C -6 Aa Ba -2 Ap Bp 0\par \par Today, patient states she continues to have pain, worse with ambulating. Feels better with sitting. States once she walks, she feels very weak. She is unable to sleep due to the pain. Feels like a baby is coming out her vagina and she is unable to push it back up. Denies splinting. Denies stress incontinence.\par \par Felt mild improvement with Gabapentin 100 mg at bedtime. Denies side effects. Patient does not feel she has an infection.\par \par Patient would like to increase Gabapentin and try a new muscle relaxant.  \par \par Patient is interested in pessary management. s/p Ring and Support #3\par \par Requesting refills on Estrace cream

## 2019-07-24 NOTE — DISCUSSION/SUMMARY
[FreeTextEntry1] : \par Urinary frequency:\par Prescribed Gabapentin 200 mg at bedtime.\par Precautions reviewed.\par Will return in 6 weeks for follow up or earlier if she has any issues.\par \par Myalgia:\par Prescribed Zanaflex 2 mg tablets twice a day\par Precautions reviewed\par \par Atrophic Vaginitis:\par Advised to continue to apply a pea size amount of the cream to the opening of the vagina three nights per week. \par \par Rectocele-\par The patient was counseled regarding the possible natural progression of prolapse and the clinical consequences of worsening prolapse. The patient was advised the prolapse is not the cause of the pain that she is having. The patient voiced understanding and agrees with pessary management.

## 2019-07-24 NOTE — COUNSELING
[FreeTextEntry1] : \par If you feel like you have an infection it is important for you to call our office and we will arrange testing of your urine.\par \par Please take AZO (over the counter) three times a day. It will turn your urine red/orange. \par \par Please begin taking Gabapentin 200 mg at bedtime. It can make you sleepy. \par \par Please begin taking Zanaflex 2 mg twice a day. It can make you sleepy.\par \par Please continue to apply a pea size amount to the opening of the vagina three times a week. \par \par Please call my office if you have any issues with the cost or side effects of the medication. \par \par Schedule a 6 weeks follow up med check appointment with JACI Julian. \par \par Please make an appointment for a pessary fitting with JACI Julian.

## 2019-07-30 DIAGNOSIS — N95.2 POSTMENOPAUSAL ATROPHIC VAGINITIS: ICD-10-CM

## 2019-07-30 DIAGNOSIS — R35.0 FREQUENCY OF MICTURITION: ICD-10-CM

## 2019-07-30 DIAGNOSIS — N81.6 RECTOCELE: ICD-10-CM

## 2019-07-30 DIAGNOSIS — M79.10 MYALGIA, UNSPECIFIED SITE: ICD-10-CM

## 2019-07-31 ENCOUNTER — APPOINTMENT (OUTPATIENT)
Dept: UROGYNECOLOGY | Facility: CLINIC | Age: 64
End: 2019-07-31

## 2019-08-02 ENCOUNTER — APPOINTMENT (OUTPATIENT)
Dept: INTERNAL MEDICINE | Facility: CLINIC | Age: 64
End: 2019-08-02

## 2019-09-04 ENCOUNTER — APPOINTMENT (OUTPATIENT)
Dept: UROGYNECOLOGY | Facility: CLINIC | Age: 64
End: 2019-09-04
Payer: MEDICAID

## 2019-09-04 ENCOUNTER — OUTPATIENT (OUTPATIENT)
Dept: OUTPATIENT SERVICES | Facility: HOSPITAL | Age: 64
LOS: 1 days | Discharge: HOME | End: 2019-09-04

## 2019-09-04 VITALS — SYSTOLIC BLOOD PRESSURE: 140 MMHG | DIASTOLIC BLOOD PRESSURE: 80 MMHG

## 2019-09-04 PROCEDURE — 57160 INSERT PESSARY/OTHER DEVICE: CPT

## 2019-09-04 NOTE — DISCUSSION/SUMMARY
[FreeTextEntry1] : \par Rectocele:\par Ring and Support #4 placed without difficulty. Remained in place with Valsalva and coughing. \par The patient tolerated all fittings well.\par The patient will follow up in 2 weeks for routine pessary management.\par \par Myalgia-\par Patient happy with Zanaflex 2 mg BID.\par Refills provided. 30 days supply with 5 refills.\par Precautions reviewed.\par Will return in 6 months for follow up or earlier if she has any issues.\par \par Urinary Frequency-\par Patient happy with Gabapentin 200 mg at bedtime.\par Refills provided. 30 days supply with 5 refills.\par Precautions reviewed.\par Will return in 6 months for follow up or earlier if she has any issues.\par \par Atrophic Vaginitis:\par Advised to continue to apply a pea size amount of the cream to the opening of the vagina three nights per week.

## 2019-09-04 NOTE — COUNSELING
[FreeTextEntry1] : \par If you feel like you have an infection it is important for you to call our office and we will arrange testing of your urine.\par \par Please call the office if you have any issues with vaginal pain, vaginal bleeding, difficulty urinating or having a bowel movement or if the pessary falls out so that we can arrange another size pessary.\par \par Please continue to apply a pea size amount of the cream to the opening of the vagina three nights per week. \par \par Please continue taking Zanaflex 2 mg for pelvic pain. Refills sent to your pharmacy.\par \par Please continue taking Gabapentin 200 mg for frequency. Refills sent to your pharmacy.\par \par Please call my office if you have any issues with the cost or side effects of the medication. \par \par Please follow up for pessary maintenance in 2 weeks with JACI Julian

## 2019-09-04 NOTE — HISTORY OF PRESENT ILLNESS
[FreeTextEntry1] : \par Patient is here for pessary fitting and med check for myalgia and frequency. GH: 4 TVL: 8\par Last seen 7/24 for med check\par \par s/p Flexeril 5 mg (no improvement)\par s/p Flexeril 10 mg (no improvement)\par s/p Baclofen 20 mg (continued to have pain)\par Estrace cream for atrophy\par Gabapentin 100 mg increased to 200 mg at bedtime for frequency\par Zanaflex 2 mg BID for myalgia\par \par Today, patient states she is seeing improvement with Gabapentin 200 mg and Zanaflex 2 mg. Pelvic pain is not as severe as before.

## 2019-09-16 DIAGNOSIS — R35.0 FREQUENCY OF MICTURITION: ICD-10-CM

## 2019-09-16 DIAGNOSIS — M79.10 MYALGIA, UNSPECIFIED SITE: ICD-10-CM

## 2019-09-16 DIAGNOSIS — N95.2 POSTMENOPAUSAL ATROPHIC VAGINITIS: ICD-10-CM

## 2019-09-16 DIAGNOSIS — N81.6 RECTOCELE: ICD-10-CM

## 2019-11-21 ENCOUNTER — APPOINTMENT (OUTPATIENT)
Dept: INTERNAL MEDICINE | Facility: CLINIC | Age: 64
End: 2019-11-21
Payer: MEDICAID

## 2019-11-21 ENCOUNTER — OUTPATIENT (OUTPATIENT)
Dept: OUTPATIENT SERVICES | Facility: HOSPITAL | Age: 64
LOS: 1 days | Discharge: HOME | End: 2019-11-21

## 2019-11-21 VITALS
HEART RATE: 80 BPM | WEIGHT: 210 LBS | SYSTOLIC BLOOD PRESSURE: 149 MMHG | HEIGHT: 63 IN | BODY MASS INDEX: 37.21 KG/M2 | DIASTOLIC BLOOD PRESSURE: 85 MMHG

## 2019-11-21 DIAGNOSIS — D64.9 ANEMIA, UNSPECIFIED: ICD-10-CM

## 2019-11-21 PROCEDURE — 99213 OFFICE O/P EST LOW 20 MIN: CPT | Mod: GC

## 2019-11-21 RX ORDER — RANITIDINE 150 MG/1
150 TABLET ORAL
Qty: 30 | Refills: 4 | Status: DISCONTINUED | COMMUNITY
Start: 2018-11-19 | End: 2019-11-21

## 2019-11-21 NOTE — ASSESSMENT
[FreeTextEntry1] : 64 years old female pt with past medical hx of GERD, DLD and spinal stenosis came for follow up for back pain.\par \par # severe back pain\par    severe spinal stenosis at L 4- L5\par    will continue with tizanidine\par    will avoid NSAIDS as pt is having GERD\par    will refer to neurosurgery\par \par # GERD\par   will increase pantoprazole to BID\par   will refer to gastroenterologist for refractory GERD\par   pt needs screening colonoscopy too\par \par # anemia\par    microcytic\par   will check iron studies\par   pt is on iron supplement\par   pt is referred for screening colonoscopy\par \par # DLD\par    will recheck DLD\par \par # health care maintenance\par   mammogram will refer\par   will refer for screening colonoscopy\par   will check cbc, cmp, TSH, vitamin b12, lipid profile , hba1c\par \par # vaccination\par    will give flu shot\par \par # follow up in 3 months and prn\par \par

## 2019-11-21 NOTE — PHYSICAL EXAM
[No Acute Distress] : no acute distress [Well Nourished] : well nourished [PERRL] : pupils equal round and reactive to light [Normal Oropharynx] : the oropharynx was normal [No JVD] : no jugular venous distention [No Respiratory Distress] : no respiratory distress  [Normal Rate] : normal rate  [No Carotid Bruits] : no carotid bruits [No Edema] : there was no peripheral edema [Soft] : abdomen soft [Non Tender] : non-tender [Normal Supraclavicular Nodes] : no supraclavicular lymphadenopathy [No Joint Swelling] : no joint swelling [Coordination Grossly Intact] : coordination grossly intact [No Focal Deficits] : no focal deficits [de-identified] : tenderness over lumbar area

## 2019-11-21 NOTE — HISTORY OF PRESENT ILLNESS
[FreeTextEntry1] :  came for follow up [de-identified] : 64 years old female pt with past medical hx of GERD, DLD and spinal stenosis came for follow up.\par she complaints of severe back pain 8-9/10 sharp in nature, radiates to her lower extremities bilaterally, she tried pain killers and EDSI didn’t help.\par she has history of urinary incontinence and atrophic vaginitis, follows with gynecologist on pessary

## 2019-11-21 NOTE — PLAN
[FreeTextEntry1] : 64 years old female pt with past medical hx of GERD, DLD and spinal stenosis came for follow up for back pain.\par \par # severe back pain\par    severe spinal stenosis at L 4- L5\par    will continue with tizanidine\par    will avoid NSAIDS as pt is having GERD\par    will refer to neurosurgery\par \par # GERD\par   will increase pantoprazole to BID\par   will refer to gastroenterologist for refractory GERD\par   pt needs screening colonoscopy too\par \par # anemia\par    microcytic\par   will check iron studies\par   pt is on iron supplement\par   pt is referred for screening colonoscopy\par \par # DLD\par    will recheck DLD\par \par # health care maintenance\par   mammogram will refer\par   will refer for screening colonoscopy\par   will check cbc, cmp, TSH, vitamin b12, lipid profile , hba1c\par \par # vaccination\par    will give flu shot\par \par # follow up in 3 months

## 2019-11-25 DIAGNOSIS — M54.5 LOW BACK PAIN: ICD-10-CM

## 2019-11-25 DIAGNOSIS — D64.9 ANEMIA, UNSPECIFIED: ICD-10-CM

## 2019-11-25 DIAGNOSIS — E78.5 HYPERLIPIDEMIA, UNSPECIFIED: ICD-10-CM

## 2019-11-25 DIAGNOSIS — Z00.00 ENCOUNTER FOR GENERAL ADULT MEDICAL EXAMINATION WITHOUT ABNORMAL FINDINGS: ICD-10-CM

## 2019-11-27 PROBLEM — R10.2 PAIN IN FEMALE PELVIS: Status: RESOLVED | Noted: 2017-02-17 | Resolved: 2019-11-27

## 2019-12-04 ENCOUNTER — APPOINTMENT (OUTPATIENT)
Dept: UROGYNECOLOGY | Facility: CLINIC | Age: 64
End: 2019-12-04
Payer: MEDICAID

## 2019-12-04 ENCOUNTER — OUTPATIENT (OUTPATIENT)
Dept: OUTPATIENT SERVICES | Facility: HOSPITAL | Age: 64
LOS: 1 days | Discharge: HOME | End: 2019-12-04

## 2019-12-04 VITALS — SYSTOLIC BLOOD PRESSURE: 134 MMHG | BODY MASS INDEX: 36.85 KG/M2 | WEIGHT: 208 LBS | DIASTOLIC BLOOD PRESSURE: 70 MMHG

## 2019-12-04 PROCEDURE — ZZZZZ: CPT

## 2019-12-04 NOTE — COUNSELING
[FreeTextEntry1] : \par Please call the office if you have any issues with vaginal pain, vaginal bleeding, difficulty urinating or having a bowel movement or if the pessary falls out so that we can arrange another size pessary.\par \par Please continue to apply a pea size amount of the cream to the opening of the vagina three nights per week.\par \par Please follow up for pessary maintenance in 3 months with JACI Julian \par \par Happy Holidays and Happy Early Birthday!

## 2019-12-04 NOTE — HISTORY OF PRESENT ILLNESS
[FreeTextEntry1] : \par Patient is here for routine pessary cleaning for rectocele.\par Last seen 9/4/19 for pessary fitting. Ring and Support #4\par \par s/p Flexeril 5 mg (no improvement)\par s/p Flexeril 10 mg (no improvement)\par s/p Baclofen 20 mg (continued to have pain)\par Estrace cream for atrophy\par Gabapentin 100 mg increased to 200 mg at bedtime for frequency\par Zanaflex 2 mg BID for myalgia\par \par Today, patient is doing well and has no complaints.

## 2019-12-04 NOTE — DISCUSSION/SUMMARY
[FreeTextEntry1] : \par Rectocele:\par Ring and Support #4 removed, cleaned, inspected and reinserted. The patient tolerated this well. \par No bleeding, lesions, abnormal discharge were noted. \par The patient will follow up in 3 months for routine pessary management.\par \par Atrophic Vaginitis:\par Advised to continue to apply a pea size amount of the cream to the opening of the vagina three nights per week.

## 2019-12-18 DIAGNOSIS — N81.6 RECTOCELE: ICD-10-CM

## 2019-12-18 DIAGNOSIS — N95.2 POSTMENOPAUSAL ATROPHIC VAGINITIS: ICD-10-CM

## 2020-01-10 ENCOUNTER — OUTPATIENT (OUTPATIENT)
Dept: OUTPATIENT SERVICES | Facility: HOSPITAL | Age: 65
LOS: 1 days | Discharge: HOME | End: 2020-01-10

## 2020-01-10 ENCOUNTER — APPOINTMENT (OUTPATIENT)
Dept: GASTROENTEROLOGY | Facility: CLINIC | Age: 65
End: 2020-01-10
Payer: MEDICAID

## 2020-01-10 PROCEDURE — 99214 OFFICE O/P EST MOD 30 MIN: CPT

## 2020-01-10 NOTE — ASSESSMENT
[FreeTextEntry1] : 63 yo F from [facility] with microcytic anemia (chronic), abdominal pain, H pylori PUD (untreated).\par \par 1)Microcytic anemia- RO JAVIER ? HX of colonic AVM no overt bleeding\par 2)PUD- Hpylori\par 3)Average CRC\par 4)Abdominal pain- functional?\par \par Plan:\par - Fe, TIBC % sat\par - Thalassemia panel\par - Repeat EGD with Hpylori testing by biopsies\par - Defer colonoscopy for now unless JAVIER confirmed with intent to treat AVM +/- VCE\par - Miralax

## 2020-01-10 NOTE — PHYSICAL EXAM
[General Appearance - Alert] : alert [General Appearance - In No Acute Distress] : in no acute distress [Neck Appearance] : the appearance of the neck was normal [] : no respiratory distress [Apical Impulse] : the apical impulse was normal [Bowel Sounds] : normal bowel sounds [Abdomen Soft] : soft [Skin Color & Pigmentation] : normal skin color and pigmentation [Abnormal Walk] : normal gait [Oriented To Time, Place, And Person] : oriented to person, place, and time

## 2020-01-10 NOTE — REASON FOR VISIT
[Consultation] : a consultation visit [FreeTextEntry1] : Referred for abdominal pain, constipation microcytic anemia

## 2020-01-10 NOTE — HISTORY OF PRESENT ILLNESS
[de-identified] : 63 yo [place] woman referred for anemia, abdominal pain and CRC screening.\par Patient is average risk for CRC (no family history) and no Hx of adenomas of a colonoscopy in 2015. 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 It is unclear whether she was treated. At the time she was recommended to repeat an EGD in 8 weeks but no follow up EGD was performed.\par Her CC is diffuse back pain that radiates down to the pelvic going through the lower abdomen. She also endorses radiation to the head and neck.\par \par Currently endorses constipation ([place] 1) Q 2-3 days with incomplete emptying and pressure. Her stool is brown.\par Denies, hematochezia, melena,  weight loss.\par Labs reviewed: mild anemia with an HGB of 10.0 (at baseline since 2017 ) and a low MCV (74).\par \par \par

## 2020-01-29 ENCOUNTER — OUTPATIENT (OUTPATIENT)
Dept: OUTPATIENT SERVICES | Facility: HOSPITAL | Age: 65
LOS: 1 days | Discharge: HOME | End: 2020-01-29

## 2020-01-29 ENCOUNTER — APPOINTMENT (OUTPATIENT)
Dept: UROGYNECOLOGY | Facility: CLINIC | Age: 65
End: 2020-01-29
Payer: MEDICAID

## 2020-01-29 VITALS — SYSTOLIC BLOOD PRESSURE: 128 MMHG | BODY MASS INDEX: 37.02 KG/M2 | WEIGHT: 209 LBS | DIASTOLIC BLOOD PRESSURE: 70 MMHG

## 2020-01-29 PROCEDURE — ZZZZZ: CPT

## 2020-01-29 NOTE — DISCUSSION/SUMMARY
[FreeTextEntry1] : \par Rectocele:\par Ring and Support #4 removed, cleaned, inspected and NOT reinserted. The patient tolerated this well. \par No bleeding, lesions, abnormal discharge were noted. \par \par Myalgia-\par Prescribed Zanaflex 4 mg BID.\par Precautions reviewed.\par Will return in 6 weeks for follow up or earlier if she has any issues.\par \par Interstitial Cystitis-\par We reviewed the management options for interstitial cystitis/painful bladder syndrome, including dietary modification, over the counter medications for symptom relief, other medications including Elmiron, amitriptyline, Cymbalta, neurontin and hydroxyzine. We discussed that Elmiron is the only FDA approved medication for this indication. We also discussed that PT, acupuncture, and bladder installations can be helpful. Diagnostic(and possible therapeutic) options include cysto with hydrodistention.\par \par Prescribed Gabapentin 300 mg at bedtime\par Precautions given\par Will follow up in 6 weeks or earlier if she has any issues\par \par Will schedule bladder instillations\par

## 2020-01-29 NOTE — REASON FOR VISIT
[Family Member] : family member [Patient Declined  Services] : - None: Patient declined  services [Source: ______] : History obtained from [unfilled] [FreeTextEntry3] : Jerrell [TWNoteComboBox1] : Other

## 2020-01-29 NOTE — HISTORY OF PRESENT ILLNESS
[FreeTextEntry1] : \par Patient is here for 6 weeks/months med check for urge incontinence.\par Last seen on 12/4/19 for pessary cleaning.\par \par s/p Flexeril 5 mg (no improvement)\par s/p Flexeril 10 mg (no improvement)\par s/p Baclofen 20 mg (continued to have pain)\par Estrace cream for atrophy\par Gabapentin 100 mg increased to 200 mg at bedtime for frequency\par Zanaflex 2 mg BID for myalgia\par \par Ring and Support #4 for rectocele\par \par Today, patient states she wishes to have pessary removed because it is bothersome. She is also bothered by constant pelvic pain. She is not noticing improvement with Zanaflex 2 mg BID or Gabapentin 200 mg at bedtime.\par \par Patient would like to increase Zanaflex and Gabapentin.\par \par She is interested in bladder instillation for bladder pain.

## 2020-01-29 NOTE — COUNSELING
[FreeTextEntry1] : \par If you feel like you have an infection it is important for you to call our office and we will arrange testing of your urine.\par \par Please begin taking Zanaflex 4 mg twice a day. It takes up to 6 weeks to go into full effect. \par \par Please begin taking Gabapentin 300 mg at bedtime. It takes up to 6 weeks to go into full effect.\par \par Please continue to apply a pea size amount to the opening of the vagina three times a week. \par \par Please call my office if you have any issues with the cost or side effects of the medication. \par \par Schedule a 6 weeks follow up med check appointment with JACI Julian. \par \par Please schedule 6 sessions for bladder instillations.

## 2020-01-30 ENCOUNTER — APPOINTMENT (OUTPATIENT)
Dept: INTERNAL MEDICINE | Facility: CLINIC | Age: 65
End: 2020-01-30

## 2020-01-30 DIAGNOSIS — M79.10 MYALGIA, UNSPECIFIED SITE: ICD-10-CM

## 2020-01-30 DIAGNOSIS — N30.10 INTERSTITIAL CYSTITIS (CHRONIC) WITHOUT HEMATURIA: ICD-10-CM

## 2020-01-30 DIAGNOSIS — N81.6 RECTOCELE: ICD-10-CM

## 2020-02-09 ENCOUNTER — EMERGENCY (EMERGENCY)
Facility: HOSPITAL | Age: 65
LOS: 0 days | Discharge: HOME | End: 2020-02-09
Attending: EMERGENCY MEDICINE | Admitting: EMERGENCY MEDICINE
Payer: MEDICAID

## 2020-02-09 VITALS
TEMPERATURE: 98 F | OXYGEN SATURATION: 98 % | RESPIRATION RATE: 18 BRPM | DIASTOLIC BLOOD PRESSURE: 78 MMHG | SYSTOLIC BLOOD PRESSURE: 150 MMHG | HEART RATE: 71 BPM

## 2020-02-09 VITALS
DIASTOLIC BLOOD PRESSURE: 93 MMHG | OXYGEN SATURATION: 98 % | HEART RATE: 73 BPM | SYSTOLIC BLOOD PRESSURE: 197 MMHG | RESPIRATION RATE: 18 BRPM | TEMPERATURE: 98 F

## 2020-02-09 DIAGNOSIS — R10.2 PELVIC AND PERINEAL PAIN: ICD-10-CM

## 2020-02-09 DIAGNOSIS — R35.0 FREQUENCY OF MICTURITION: ICD-10-CM

## 2020-02-09 DIAGNOSIS — R10.9 UNSPECIFIED ABDOMINAL PAIN: ICD-10-CM

## 2020-02-09 DIAGNOSIS — R30.0 DYSURIA: ICD-10-CM

## 2020-02-09 LAB
ALBUMIN SERPL ELPH-MCNC: 4.3 G/DL — SIGNIFICANT CHANGE UP (ref 3.5–5.2)
ALP SERPL-CCNC: 71 U/L — SIGNIFICANT CHANGE UP (ref 30–115)
ALT FLD-CCNC: 16 U/L — SIGNIFICANT CHANGE UP (ref 0–41)
ANION GAP SERPL CALC-SCNC: 12 MMOL/L — SIGNIFICANT CHANGE UP (ref 7–14)
APPEARANCE UR: CLEAR — SIGNIFICANT CHANGE UP
AST SERPL-CCNC: 24 U/L — SIGNIFICANT CHANGE UP (ref 0–41)
BASOPHILS # BLD AUTO: 0.02 K/UL — SIGNIFICANT CHANGE UP (ref 0–0.2)
BASOPHILS NFR BLD AUTO: 0.6 % — SIGNIFICANT CHANGE UP (ref 0–1)
BILIRUB SERPL-MCNC: 0.2 MG/DL — SIGNIFICANT CHANGE UP (ref 0.2–1.2)
BILIRUB UR-MCNC: NEGATIVE — SIGNIFICANT CHANGE UP
BUN SERPL-MCNC: 8 MG/DL — LOW (ref 10–20)
CALCIUM SERPL-MCNC: 9.7 MG/DL — SIGNIFICANT CHANGE UP (ref 8.5–10.1)
CHLORIDE SERPL-SCNC: 105 MMOL/L — SIGNIFICANT CHANGE UP (ref 98–110)
CO2 SERPL-SCNC: 26 MMOL/L — SIGNIFICANT CHANGE UP (ref 17–32)
COLOR SPEC: SIGNIFICANT CHANGE UP
CREAT SERPL-MCNC: 0.7 MG/DL — SIGNIFICANT CHANGE UP (ref 0.7–1.5)
DIFF PNL FLD: NEGATIVE — SIGNIFICANT CHANGE UP
EOSINOPHIL # BLD AUTO: 0.11 K/UL — SIGNIFICANT CHANGE UP (ref 0–0.7)
EOSINOPHIL NFR BLD AUTO: 3.1 % — SIGNIFICANT CHANGE UP (ref 0–8)
GLUCOSE SERPL-MCNC: 103 MG/DL — HIGH (ref 70–99)
GLUCOSE UR QL: NEGATIVE — SIGNIFICANT CHANGE UP
HCT VFR BLD CALC: 36.6 % — LOW (ref 37–47)
HGB BLD-MCNC: 11 G/DL — LOW (ref 12–16)
IMM GRANULOCYTES NFR BLD AUTO: 0.3 % — SIGNIFICANT CHANGE UP (ref 0.1–0.3)
KETONES UR-MCNC: NEGATIVE — SIGNIFICANT CHANGE UP
LACTATE SERPL-SCNC: 1 MMOL/L — SIGNIFICANT CHANGE UP (ref 0.7–2)
LEUKOCYTE ESTERASE UR-ACNC: NEGATIVE — SIGNIFICANT CHANGE UP
LIDOCAIN IGE QN: 34 U/L — SIGNIFICANT CHANGE UP (ref 7–60)
LYMPHOCYTES # BLD AUTO: 1.62 K/UL — SIGNIFICANT CHANGE UP (ref 1.2–3.4)
LYMPHOCYTES # BLD AUTO: 45.4 % — SIGNIFICANT CHANGE UP (ref 20.5–51.1)
MCHC RBC-ENTMCNC: 22.3 PG — LOW (ref 27–31)
MCHC RBC-ENTMCNC: 30.1 G/DL — LOW (ref 32–37)
MCV RBC AUTO: 74.2 FL — LOW (ref 81–99)
MONOCYTES # BLD AUTO: 0.42 K/UL — SIGNIFICANT CHANGE UP (ref 0.1–0.6)
MONOCYTES NFR BLD AUTO: 11.8 % — HIGH (ref 1.7–9.3)
NEUTROPHILS # BLD AUTO: 1.39 K/UL — LOW (ref 1.4–6.5)
NEUTROPHILS NFR BLD AUTO: 38.8 % — LOW (ref 42.2–75.2)
NITRITE UR-MCNC: NEGATIVE — SIGNIFICANT CHANGE UP
NRBC # BLD: 0 /100 WBCS — SIGNIFICANT CHANGE UP (ref 0–0)
PH UR: 6.5 — SIGNIFICANT CHANGE UP (ref 5–8)
PLATELET # BLD AUTO: 254 K/UL — SIGNIFICANT CHANGE UP (ref 130–400)
POTASSIUM SERPL-MCNC: 4.4 MMOL/L — SIGNIFICANT CHANGE UP (ref 3.5–5)
POTASSIUM SERPL-SCNC: 4.4 MMOL/L — SIGNIFICANT CHANGE UP (ref 3.5–5)
PROT SERPL-MCNC: 7.1 G/DL — SIGNIFICANT CHANGE UP (ref 6–8)
PROT UR-MCNC: NEGATIVE — SIGNIFICANT CHANGE UP
RBC # BLD: 4.93 M/UL — SIGNIFICANT CHANGE UP (ref 4.2–5.4)
RBC # FLD: 16.3 % — HIGH (ref 11.5–14.5)
SODIUM SERPL-SCNC: 143 MMOL/L — SIGNIFICANT CHANGE UP (ref 135–146)
SP GR SPEC: 1.01 — SIGNIFICANT CHANGE UP (ref 1.01–1.02)
TROPONIN T SERPL-MCNC: <0.01 NG/ML — SIGNIFICANT CHANGE UP
UROBILINOGEN FLD QL: SIGNIFICANT CHANGE UP
WBC # BLD: 3.57 K/UL — LOW (ref 4.8–10.8)
WBC # FLD AUTO: 3.57 K/UL — LOW (ref 4.8–10.8)

## 2020-02-09 PROCEDURE — 93010 ELECTROCARDIOGRAM REPORT: CPT

## 2020-02-09 PROCEDURE — 43239 EGD BIOPSY SINGLE/MULTIPLE: CPT

## 2020-02-09 PROCEDURE — 74177 CT ABD & PELVIS W/CONTRAST: CPT | Mod: 26

## 2020-02-09 PROCEDURE — 99285 EMERGENCY DEPT VISIT HI MDM: CPT

## 2020-02-09 RX ORDER — MORPHINE SULFATE 50 MG/1
4 CAPSULE, EXTENDED RELEASE ORAL ONCE
Refills: 0 | Status: DISCONTINUED | OUTPATIENT
Start: 2020-02-09 | End: 2020-02-09

## 2020-02-09 RX ORDER — FAMOTIDINE 10 MG/ML
20 INJECTION INTRAVENOUS ONCE
Refills: 0 | Status: COMPLETED | OUTPATIENT
Start: 2020-02-09 | End: 2020-02-09

## 2020-02-09 RX ADMIN — MORPHINE SULFATE 4 MILLIGRAM(S): 50 CAPSULE, EXTENDED RELEASE ORAL at 12:25

## 2020-02-09 RX ADMIN — FAMOTIDINE 20 MILLIGRAM(S): 10 INJECTION INTRAVENOUS at 12:25

## 2020-02-09 NOTE — ED ADULT NURSE NOTE - NSSUHOSCREENINGYN_ED_ALL_ED
Yes - the patient is able to be screened
pictorial/skill demonstration/group instruction/individual instruction/video/verbal instruction/audio/written material/computer/internet

## 2020-02-09 NOTE — ED PROVIDER NOTE - OBJECTIVE STATEMENT
64 year old female with pmh of rectocele s/p pessary & removal 1 week ago presents here c/o lower abdominal pain & pressure. Symptoms associated with urinary frequency & burning .Denies fever chills cough cp sob n/v or diarrhea

## 2020-02-09 NOTE — ED PROVIDER NOTE - ATTENDING CONTRIBUTION TO CARE
64yF p/w abd pain - hx provided by pt and daughter at bedside, using daughter as  at pt preference.  Pt has had 2d of diffuse abd pain, along w/ inc urinary frequency and urethral pain radiating down b/l inner thighs.  No fevers, v/d.    CONSTITUTIONAL: well developed; well nourished; well appearing in no acute distress  HEAD: normocephalic; atraumatic  EYES: no conjunctival injection, no scleral icterus  ENT: no nasal discharge; airway clear.  NECK: supple; non tender. + full passive ROM in all directions  CARD: S1, S2 normal; no murmurs, gallops, or rubs. Regular rate and rhythm  RESP: no wheezes, rales or rhonchi. Good air movement bilaterally without significant accessory muscle use  ABD: soft; non-distended; mild diffuse tenderness, nimesh suprapubic, no rebound, no guarding, no pulsatile abdominal mass  EXT: moving all extremities spontaneously, normal ROM. No clubbing, cyanosis or edema  SKIN: warm and dry, no lesions noted  NEURO: alert, oriented, CN II-XII grossly intact, motor and sensory grossly intact, speech nonslurred, no focal deficits. GCS 15  PSYCH: calm, cooperative, appropriate, good eye contact, logical thought process, no apparent danger to self or others

## 2020-02-09 NOTE — ED PROVIDER NOTE - NSFOLLOWUPCLINICS_GEN_ALL_ED_FT
[facility]  OB/GYN  [address] [place] [address]  Phone: [phone]  Fax:   Follow Up Time: 1-3 Days

## 2020-02-09 NOTE — ED PROVIDER NOTE - NSFOLLOWUPINSTRUCTIONS_ED_ALL_ED_FT
Pelvic Organ Prolapse  Pelvic organ prolapse is the stretching, bulging, or dropping of pelvic organs into an abnormal position. It happens when the muscles and tissues that surround and support pelvic structures become weak or stretched. Pelvic organ prolapse can involve the:  Vagina (vaginal prolapse).Uterus (uterine prolapse).Bladder (cystocele).Rectum (rectocele).Intestines (enterocele).When organs other than the vagina are involved, they often bulge into the vagina or protrude from the vagina, depending on how severe the prolapse is.  What are the causes?  This condition may be caused by:  Pregnancy, labor, and childbirth.Past pelvic surgery.Decreased production of the hormone estrogen associated with menopause.Consistently lifting more than 50 lb (23 kg).Obesity.Long-term inability to pass stool (chronic constipation).A cough that lasts a long time (chronic).Buildup of fluid in the abdomen due to certain diseases and other conditions.What are the signs or symptoms?  Symptoms of this condition include:  Passing a little urine (loss of bladder control) when you cough, sneeze, strain, and exercise (stress incontinence). This may be worse immediately after childbirth. It may gradually improve over time.Feeling pressure in your pelvis or vagina. This pressure may increase when you cough or when you are passing stool.A bulge that protrudes from the opening of your vagina.Difficulty passing urine or stool.Pain in your lower back.Pain, discomfort, or disinterest in sex.Repeated bladder infections (urinary tract infections).Difficulty inserting a tampon.In some people, this condition causes no symptoms.  How is this diagnosed?  This condition may be diagnosed based on a vaginal and rectal exam. During the exam, you may be asked to cough and strain while you are lying down, sitting, and standing up. Your health care provider will determine if other tests are required, such as bladder function tests.  How is this treated?  Treatment for this condition may depend on your symptoms. Treatment may include:  Lifestyle changes, such as changes to your diet.Emptying your bladder at scheduled times (bladder training therapy). This can help reduce or avoid urinary incontinence.Estrogen. Estrogen may help mild prolapse by increasing the strength and tone of pelvic floor muscles.Kegel exercises. These may help mild cases of prolapse by strengthening and tightening the muscles of the pelvic floor.A soft, flexible device that helps support the vaginal walls and keep pelvic organs in place (pessary). This is inserted into your vagina by your health care provider.Surgery. This is often the only form of treatment for severe prolapse.Follow these instructions at home:  Avoid drinking beverages that contain caffeine or alcohol.Increase your intake of high-fiber foods. This can help decrease constipation and straining during bowel movements.Lose weight if recommended by your health care provider.Wear a sanitary pad or adult diapers if you have urinary incontinence.Avoid heavy lifting and straining with exercise and work. Do not hold your breath when you perform mild to moderate lifting and exercise activities. Limit your activities as directed by your health care provider.Do Kegel exercises as directed by your health care provider. To do this:  Squeeze your pelvic floor muscles tight. You should feel a tight lift in your rectal area and a tightness in your vaginal area. Keep your stomach, buttocks, and legs relaxed.Hold the muscles tight for up to 10 seconds. Relax your muscles.Repeat this exercise 50 times a day, or as many times as told by your health care provider. Continue to do this exercise for at least 4–6 weeks, or for as long as told by your health care provider.Take over-the-counter and prescription medicines only as told by your health care provider.If you have a pessary, take care of it as told by your health care provider.Keep all follow-up visits as told by your health care provider. This is important.Contact a health care provider if you:  Have symptoms that interfere with your daily activities or sex life.Need medicine to help with the discomfort.Notice bleeding from your vagina that is not related to your period.Have a fever.Have pain or bleeding when you urinate.Have bleeding when you pass stool.Pass urine when you have sex.Have chronic constipation.Have a pessary that falls out.Have bad smelling vaginal discharge.Have an unusual, low pain in your abdomen.Summary  Pelvic organ prolapse is the stretching, bulging, or dropping of pelvic organs into an abnormal position. It happens when the muscles and tissues that surround and support pelvic structures become weak or stretched.When organs other than the vagina are involved, they often bulge into the vagina or protrude from the vagina, depending on how severe the prolapse is.In most cases, this condition needs to be treated only if it produces symptoms. Treatment may include lifestyle changes, estrogen, Kegel exercises, pessary insertion, or surgery.Avoid heavy lifting and straining with exercise and work. Do not hold your breath when you perform mild to moderate lifting and exercise activities. Limit your activities as directed by your health care provider.This information is not intended to replace advice given to you by your health care provider. Make sure you discuss any questions you have with your health care provider.

## 2020-02-09 NOTE — ED ADULT NURSE NOTE - NSIMPLEMENTINTERV_GEN_ALL_ED
Implemented All Universal Safety Interventions:  [place] to call system. Call bell, personal items and telephone within reach. Instruct patient to call for assistance. Room bathroom lighting operational. Non-slip footwear when patient is off stretcher. Physically safe environment: no spills, clutter or unnecessary equipment. Stretcher in lowest position, wheels locked, appropriate side rails in place.

## 2020-02-09 NOTE — ED PROVIDER NOTE - PHYSICAL EXAMINATION
CONSTITUTIONAL: WA / WN / NAD  HEAD: NCAT  EYES: PERRL; EOMI;   ENT: Normal pharynx; mucous membranes pink/moist, no erythema.  NECK: Supple; no meningeal signs  CARD: RRR; nl S1/S2; no M/R/G.   RESP: Respiratory rate and effort are normal; breath sounds clear and equal bilaterally.  ABD: Soft,  ND +epigastric ttp & +suprapubic tenderness  MSK/EXT: No gross deformities; full range of motion.  SKIN: Warm and dry;   NEURO: AAOx3  PSYCH: Memory Intact, Normal Affect

## 2020-02-09 NOTE — ED PROVIDER NOTE - NS ED ROS FT
Constitutional: See HPI.  Eyes: No visual changes  ENMT: No neck pain   Cardiac: No cp  Respiratory: No cough  GI: see hpi  : see hpi  MS: No myalgia, muscle weakness, joint pain or back pain.  Psych: No suicidal or homicidal ideations.  Neuro: No headache   Skin: No skin rash.

## 2020-02-09 NOTE — ED PROVIDER NOTE - CLINICAL SUMMARY MEDICAL DECISION MAKING FREE TEXT BOX
64yF p/w pelvic pain and urinary sx after recent pessary removal (placed for rectocele).  Abd soft despite mild tenderness.  UA neg. Labs and CT reassuring.  Recommend close o/p f/u (pt already has ob/gyn appointment for this week), supportive care, return precautions.

## 2020-02-09 NOTE — ED PROVIDER NOTE - PATIENT PORTAL LINK FT
You can access the FollowMyHealth Patient Portal offered by [facility] by registering at the following website: [email or web address]. By joining [employer]’s FollowMyHealth portal, you will also be able to view your health information using other applications (apps) compatible with our system.

## 2020-02-10 LAB
CULTURE RESULTS: SIGNIFICANT CHANGE UP
SPECIMEN SOURCE: SIGNIFICANT CHANGE UP

## 2020-02-14 ENCOUNTER — OUTPATIENT (OUTPATIENT)
Dept: OUTPATIENT SERVICES | Facility: HOSPITAL | Age: 65
LOS: 1 days | Discharge: HOME | End: 2020-02-14

## 2020-02-14 ENCOUNTER — RESULT CHARGE (OUTPATIENT)
Age: 65
End: 2020-02-14

## 2020-02-14 ENCOUNTER — APPOINTMENT (OUTPATIENT)
Dept: UROGYNECOLOGY | Facility: CLINIC | Age: 65
End: 2020-02-14
Payer: MEDICAID

## 2020-02-14 VITALS
HEIGHT: 63 IN | DIASTOLIC BLOOD PRESSURE: 66 MMHG | SYSTOLIC BLOOD PRESSURE: 108 MMHG | WEIGHT: 209 LBS | BODY MASS INDEX: 37.03 KG/M2

## 2020-02-14 PROCEDURE — ZZZZZ: CPT

## 2020-02-18 LAB
BILIRUB UR QL STRIP: NORMAL
CLARITY UR: CLEAR
COLLECTION METHOD: NORMAL
GLUCOSE UR-MCNC: NORMAL
HCG UR QL: 0.2 EU/DL
HGB UR QL STRIP.AUTO: NORMAL
KETONES UR-MCNC: NORMAL
LEUKOCYTE ESTERASE UR QL STRIP: NORMAL
NITRITE UR QL STRIP: NORMAL
PH UR STRIP: 6
PROT UR STRIP-MCNC: NORMAL
SP GR UR STRIP: 1.02

## 2020-02-19 ENCOUNTER — APPOINTMENT (OUTPATIENT)
Dept: INTERNAL MEDICINE | Facility: CLINIC | Age: 65
End: 2020-02-19

## 2020-02-21 ENCOUNTER — APPOINTMENT (OUTPATIENT)
Dept: UROGYNECOLOGY | Facility: CLINIC | Age: 65
End: 2020-02-21
Payer: MEDICAID

## 2020-02-21 ENCOUNTER — OUTPATIENT (OUTPATIENT)
Dept: OUTPATIENT SERVICES | Facility: HOSPITAL | Age: 65
LOS: 1 days | Discharge: HOME | End: 2020-02-21

## 2020-02-21 ENCOUNTER — RESULT CHARGE (OUTPATIENT)
Age: 65
End: 2020-02-21

## 2020-02-21 VITALS — SYSTOLIC BLOOD PRESSURE: 110 MMHG | BODY MASS INDEX: 37.02 KG/M2 | WEIGHT: 209 LBS | DIASTOLIC BLOOD PRESSURE: 80 MMHG

## 2020-02-21 LAB
BILIRUB UR QL STRIP: NEGATIVE
CLARITY UR: CLEAR
COLLECTION METHOD: NORMAL
GLUCOSE UR-MCNC: NEGATIVE
HCG UR QL: 0.2 EU/DL
HGB UR QL STRIP.AUTO: NEGATIVE
KETONES UR-MCNC: NEGATIVE
LEUKOCYTE ESTERASE UR QL STRIP: NEGATIVE
NITRITE UR QL STRIP: NEGATIVE
PH UR STRIP: 7
PROT UR STRIP-MCNC: NEGATIVE
SP GR UR STRIP: 1.02

## 2020-02-21 PROCEDURE — 51700 IRRIGATION OF BLADDER: CPT

## 2020-02-25 DIAGNOSIS — N30.10 INTERSTITIAL CYSTITIS (CHRONIC) WITHOUT HEMATURIA: ICD-10-CM

## 2020-02-28 ENCOUNTER — APPOINTMENT (OUTPATIENT)
Dept: UROGYNECOLOGY | Facility: CLINIC | Age: 65
End: 2020-02-28
Payer: MEDICAID

## 2020-02-28 ENCOUNTER — OUTPATIENT (OUTPATIENT)
Dept: OUTPATIENT SERVICES | Facility: HOSPITAL | Age: 65
LOS: 1 days | Discharge: HOME | End: 2020-02-28

## 2020-02-28 ENCOUNTER — RESULT CHARGE (OUTPATIENT)
Age: 65
End: 2020-02-28

## 2020-02-28 VITALS
HEIGHT: 63 IN | BODY MASS INDEX: 37.03 KG/M2 | DIASTOLIC BLOOD PRESSURE: 76 MMHG | SYSTOLIC BLOOD PRESSURE: 116 MMHG | WEIGHT: 209 LBS

## 2020-02-28 DIAGNOSIS — N30.10 INTERSTITIAL CYSTITIS (CHRONIC) WITHOUT HEMATURIA: ICD-10-CM

## 2020-02-28 PROCEDURE — ZZZZZ: CPT

## 2020-03-06 ENCOUNTER — APPOINTMENT (OUTPATIENT)
Dept: UROGYNECOLOGY | Facility: CLINIC | Age: 65
End: 2020-03-06

## 2020-03-11 ENCOUNTER — APPOINTMENT (OUTPATIENT)
Dept: UROGYNECOLOGY | Facility: CLINIC | Age: 65
End: 2020-03-11

## 2020-03-18 ENCOUNTER — APPOINTMENT (OUTPATIENT)
Dept: UROGYNECOLOGY | Facility: CLINIC | Age: 65
End: 2020-03-18

## 2020-03-23 ENCOUNTER — RX RENEWAL (OUTPATIENT)
Age: 65
End: 2020-03-23

## 2020-08-14 ENCOUNTER — OUTPATIENT (OUTPATIENT)
Dept: OUTPATIENT SERVICES | Facility: HOSPITAL | Age: 65
LOS: 1 days | Discharge: HOME | End: 2020-08-14

## 2020-08-14 ENCOUNTER — APPOINTMENT (OUTPATIENT)
Dept: UROGYNECOLOGY | Facility: CLINIC | Age: 65
End: 2020-08-14
Payer: MEDICAID

## 2020-08-14 VITALS — DIASTOLIC BLOOD PRESSURE: 72 MMHG | SYSTOLIC BLOOD PRESSURE: 120 MMHG | HEIGHT: 63 IN

## 2020-08-14 PROCEDURE — 99213 OFFICE O/P EST LOW 20 MIN: CPT

## 2020-08-14 NOTE — PHYSICAL EXAM
[Chaperone Present] : A chaperone was present in the examining room during all aspects of the physical examination [Well developed] : well developed [Well Nourished] : ~L well nourished [No Acute Distress] : in no acute distress

## 2020-08-14 NOTE — DISCUSSION/SUMMARY
[FreeTextEntry1] : Interstitial Cystitis\par \par Will call pt's insurance to obtain prior auth for Elmiron 100mg TID. Once approved will call pt to begin taking it.\par Will schedule pt for 6 months f/u for med check after that.\par Explained to the pt that hysterectomy will not help patient with Interstitial cystitis pain.\par Pt voiced understanding to everything.  \par \par

## 2020-08-14 NOTE — COUNSELING
[FreeTextEntry1] : If you feel like you have an infection it is important for you to call our office and we will arrange testing of your urine.\par \par Please STOP taking Gabapentin 100 mg.\par \par We have to call your insurance company to obtain authorization for Elmiron 100mg. Once we get auth we will call you to schedule 6 months follow up.\par \par Please begin taking Elmiron 100mg 3 times a day once we get approval. It takes up to 6 months to go into full effect. Please  your refill when you complete the 1st bottle.\par \par

## 2020-08-14 NOTE — HISTORY OF PRESENT ILLNESS
[FreeTextEntry1] : Patient is here for months med check for bladder pain.\par Last seen on 1/29/2020 for pessary cleaning, Ring and support #4 for rectocele and med check.\par \par s/p Flexeril 5 mg (no improvement)\par s/p Flexeril 10 mg (no improvement)\par s/p Baclofen 20 mg (continued to have pain)\par Estrace cream for atrophy\par Gabapentin 100 mg increased to 200 mg at bedtime for frequency (no improvement)\par Zanaflex 2 mg BID for myalgia (no improvement)\par \par s/p Ring and Support #4 for rectocele (bothersome)\par \par s/p Zanaflex 4mg: no benefit\par s/p Gabapentin 100mg TID: minimal benefit  \par \par s/p bladder instillation x3 for bladder pain: no benefit\par \par Today, patient states Gabapentin 100mg TID did not help. She did not start Elmiron as it was not approved by her insurance.\par Patient does not feel she has an infection.\par \par Today pt is c/o constant bladder pain and burning. Pt states that she is considering to have hysterectomy to help the bladder pain.\par Patient is willing to try Elmiron 100mg TID for bladder pain.\par

## 2020-08-21 DIAGNOSIS — N30.10 INTERSTITIAL CYSTITIS (CHRONIC) WITHOUT HEMATURIA: ICD-10-CM

## 2020-08-21 DIAGNOSIS — N81.6 RECTOCELE: ICD-10-CM

## 2020-08-21 DIAGNOSIS — R35.0 FREQUENCY OF MICTURITION: ICD-10-CM

## 2020-10-09 ENCOUNTER — APPOINTMENT (OUTPATIENT)
Dept: INTERNAL MEDICINE | Facility: CLINIC | Age: 65
End: 2020-10-09
Payer: MEDICAID

## 2020-10-09 ENCOUNTER — OUTPATIENT (OUTPATIENT)
Dept: OUTPATIENT SERVICES | Facility: HOSPITAL | Age: 65
LOS: 1 days | Discharge: HOME | End: 2020-10-09

## 2020-10-09 VITALS
DIASTOLIC BLOOD PRESSURE: 82 MMHG | TEMPERATURE: 97.7 F | HEART RATE: 79 BPM | SYSTOLIC BLOOD PRESSURE: 136 MMHG | WEIGHT: 205 LBS | HEIGHT: 63 IN | BODY MASS INDEX: 36.32 KG/M2

## 2020-10-09 PROCEDURE — 99213 OFFICE O/P EST LOW 20 MIN: CPT | Mod: GC

## 2020-10-09 RX ORDER — PANTOPRAZOLE 40 MG/1
40 TABLET, DELAYED RELEASE ORAL
Qty: 60 | Refills: 5 | Status: DISCONTINUED | COMMUNITY
Start: 2018-11-19 | End: 2020-10-09

## 2020-10-09 RX ORDER — ESTRADIOL 0.1 MG/G
0.1 CREAM VAGINAL
Qty: 1 | Refills: 3 | Status: DISCONTINUED | COMMUNITY
Start: 2018-09-21 | End: 2020-10-09

## 2020-10-09 RX ORDER — CHLORHEXIDINE GLUCONATE 4 %
325 (65 FE) LIQUID (ML) TOPICAL DAILY
Qty: 30 | Refills: 5 | Status: DISCONTINUED | COMMUNITY
Start: 2018-12-19 | End: 2020-10-09

## 2020-10-09 RX ORDER — GABAPENTIN 300 MG/1
300 CAPSULE ORAL
Qty: 30 | Refills: 1 | Status: DISCONTINUED | COMMUNITY
Start: 2019-06-12 | End: 2020-10-09

## 2020-10-09 RX ORDER — POLYETHYLENE GLYCOL 3350 17 G/17G
17 POWDER, FOR SOLUTION ORAL TWICE DAILY
Qty: 60 | Refills: 4 | Status: DISCONTINUED | COMMUNITY
Start: 2020-01-10 | End: 2020-10-09

## 2020-10-09 NOTE — PLAN
[FreeTextEntry1] : 65 years old female pt with past medical hx of GERD, DLD and spinal stenosis came in for follow up and \par \par #interestial cystis\par - Urogyn is following \par - started on  Elmiron 100mg but not helping \par \par # severe back pain 2/2 severe spinal stenosis at L 4- L5\par  -will refer to neurosurgery\par  - Pain medicine  consult\par \par # GERD-  will start PPI\par  \par # anemia- microcytic\par - Thalesmia workup is non remarkable\par - Iron studies done, will repeat\par - Will repeat CBC\par - GI following, no need for repeat Colonscopy repeat for now\par \par # DLD- not on any medication \par  will recheck DLD\par \par # health care maintenance\par mammogram will order\par as per patient she took flu shot this year \par GI following, last colonscopy in 2015,\par \par # follow up in 3 months and prn

## 2020-10-09 NOTE — HISTORY OF PRESENT ILLNESS
[FreeTextEntry1] : 65 years old female pt with past medical hx of GERD, DLD and spinal stenosis came for follow up, abdominal and back pain.  [de-identified] : 65 years old female pt with past medical hx of GERD, DLD and spinal stenosis came in for follow up, abdominal pain and low back pain. \par patient is still having back pain for the last 10 years, she did not follow up with neurosurgery. she is also following with urogynecology for cystitis. started on Elmiron with slight improvement of symptoms,

## 2020-10-09 NOTE — REVIEW OF SYSTEMS
[Fatigue] : fatigue [Abdominal Pain] : abdominal pain [Heartburn] : heartburn [Joint Pain] : joint pain [Muscle Pain] : muscle pain [Back Pain] : back pain [Fever] : no fever [Chills] : no chills [Night Sweats] : no night sweats [Recent Change In Weight] : ~T no recent weight change [Discharge] : no discharge [Pain] : no pain [Redness] : no redness [Vision Problems] : no vision problems [Itching] : no itching [Earache] : no earache [Hearing Loss] : no hearing loss [Nasal Discharge] : no nasal discharge [Sore Throat] : no sore throat [Chest Pain] : no chest pain [Palpitations] : no palpitations [Orthopnea] : no orthopnea [Shortness Of Breath] : no shortness of breath [Wheezing] : no wheezing [Nausea] : no nausea [Vomiting] : no vomiting [Mole Changes] : no mole changes [Skin Rash] : no skin rash

## 2020-10-09 NOTE — PHYSICAL EXAM
[No Acute Distress] : no acute distress [Normal Voice/Communication] : normal voice/communication [Normal Sclera/Conjunctiva] : normal sclera/conjunctiva [PERRL] : pupils equal round and reactive to light [Normal Outer Ear/Nose] : the outer ears and nose were normal in appearance [No JVD] : no jugular venous distention [No Respiratory Distress] : no respiratory distress  [Normal Rate] : normal rate  [Regular Rhythm] : with a regular rhythm [Normal S1, S2] : normal S1 and S2 [No Edema] : there was no peripheral edema [Soft] : abdomen soft [No HSM] : no HSM [Normal Bowel Sounds] : normal bowel sounds [No CVA Tenderness] : no CVA  tenderness [Coordination Grossly Intact] : coordination grossly intact [No Focal Deficits] : no focal deficits [de-identified] : tenderness [de-identified] : walks with walker

## 2020-10-13 ENCOUNTER — LABORATORY RESULT (OUTPATIENT)
Age: 65
End: 2020-10-13

## 2020-10-13 DIAGNOSIS — Z23 ENCOUNTER FOR IMMUNIZATION: ICD-10-CM

## 2020-10-13 DIAGNOSIS — N30.10 INTERSTITIAL CYSTITIS (CHRONIC) WITHOUT HEMATURIA: ICD-10-CM

## 2020-10-13 DIAGNOSIS — M54.5 LOW BACK PAIN: ICD-10-CM

## 2020-10-13 DIAGNOSIS — R10.9 UNSPECIFIED ABDOMINAL PAIN: ICD-10-CM

## 2020-10-13 DIAGNOSIS — Z00.00 ENCOUNTER FOR GENERAL ADULT MEDICAL EXAMINATION WITHOUT ABNORMAL FINDINGS: ICD-10-CM

## 2020-10-14 LAB
25(OH)D3 SERPL-MCNC: 22 NG/ML
ALBUMIN SERPL ELPH-MCNC: 4.5 G/DL
ALP BLD-CCNC: 84 U/L
ALT SERPL-CCNC: 11 U/L
ANION GAP SERPL CALC-SCNC: 13 MMOL/L
AST SERPL-CCNC: 15 U/L
BASOPHILS # BLD AUTO: 0.03 K/UL
BASOPHILS NFR BLD AUTO: 0.5 %
BILIRUB SERPL-MCNC: 0.3 MG/DL
BUN SERPL-MCNC: 14 MG/DL
CALCIUM SERPL-MCNC: 9.6 MG/DL
CHLORIDE SERPL-SCNC: 101 MMOL/L
CHOLEST SERPL-MCNC: 294 MG/DL
CHOLEST/HDLC SERPL: 5 RATIO
CO2 SERPL-SCNC: 25 MMOL/L
CREAT SERPL-MCNC: 0.8 MG/DL
EOSINOPHIL # BLD AUTO: 0.11 K/UL
EOSINOPHIL NFR BLD AUTO: 1.7 %
FERRITIN SERPL-MCNC: 61 NG/ML
GLUCOSE SERPL-MCNC: 81 MG/DL
HCT VFR BLD CALC: 39.1 %
HDLC SERPL-MCNC: 59 MG/DL
HGB BLD-MCNC: 11.4 G/DL
IMM GRANULOCYTES NFR BLD AUTO: 0.2 %
IRON SATN MFR SERPL: 26 %
IRON SERPL-MCNC: 76 UG/DL
LDLC SERPL CALC-MCNC: 219 MG/DL
LYMPHOCYTES # BLD AUTO: 2.88 K/UL
LYMPHOCYTES NFR BLD AUTO: 45.8 %
MAN DIFF?: NORMAL
MCHC RBC-ENTMCNC: 21.7 PG
MCHC RBC-ENTMCNC: 29.2 G/DL
MCV RBC AUTO: 74.5 FL
MONOCYTES # BLD AUTO: 0.57 K/UL
MONOCYTES NFR BLD AUTO: 9.1 %
NEUTROPHILS # BLD AUTO: 2.69 K/UL
NEUTROPHILS NFR BLD AUTO: 42.7 %
PLATELET # BLD AUTO: 323 K/UL
POTASSIUM SERPL-SCNC: 5.2 MMOL/L
PROT SERPL-MCNC: 7.4 G/DL
RBC # BLD: 5.25 M/UL
RBC # FLD: 16.9 %
SODIUM SERPL-SCNC: 139 MMOL/L
TIBC SERPL-MCNC: 297 UG/DL
TRANSFERRIN SERPL-MCNC: 237 MG/DL
TRIGL SERPL-MCNC: 105 MG/DL
TSH SERPL-ACNC: 1.13 UIU/ML
UIBC SERPL-MCNC: 221 UG/DL
WBC # FLD AUTO: 6.29 K/UL

## 2020-10-20 ENCOUNTER — OUTPATIENT (OUTPATIENT)
Dept: OUTPATIENT SERVICES | Facility: HOSPITAL | Age: 65
LOS: 1 days | Discharge: HOME | End: 2020-10-20
Payer: MEDICAID

## 2020-10-20 ENCOUNTER — RESULT REVIEW (OUTPATIENT)
Age: 65
End: 2020-10-20

## 2020-10-20 DIAGNOSIS — Z12.31 ENCOUNTER FOR SCREENING MAMMOGRAM FOR MALIGNANT NEOPLASM OF BREAST: ICD-10-CM

## 2020-10-20 PROCEDURE — 77067 SCR MAMMO BI INCL CAD: CPT | Mod: 26

## 2020-10-20 PROCEDURE — 77063 BREAST TOMOSYNTHESIS BI: CPT | Mod: 26

## 2020-10-22 VITALS — HEIGHT: 64 IN | BODY MASS INDEX: 34.15 KG/M2 | WEIGHT: 200 LBS

## 2020-10-26 ENCOUNTER — APPOINTMENT (OUTPATIENT)
Dept: NEUROSURGERY | Facility: CLINIC | Age: 65
End: 2020-10-26
Payer: MEDICAID

## 2020-10-26 PROCEDURE — 99202 OFFICE O/P NEW SF 15 MIN: CPT

## 2020-10-26 PROCEDURE — 99072 ADDL SUPL MATRL&STAF TM PHE: CPT

## 2020-10-26 NOTE — REASON FOR VISIT
[New Patient Visit] : a new patient visit [Referred By: _________] : Patient was referred by GIO [Family Member] : family member

## 2020-10-26 NOTE — PLAN
[FreeTextEntry1] : Discuss MRI findings with patient which correlates with her symptoms. I am recommending patient maximizes conservative management such as physical therapy and pain management. I am referring her to physical therapy and giving her lumbar exercises/stretches to do at home. She can continue to follow up with pain management. We also discussed the importance of weigh loss as this can also help alleviates some of axial back pain she may have. She will follow up if conservative management fails.

## 2020-10-26 NOTE — HISTORY OF PRESENT ILLNESS
[> 3 months] : more  than 3 months [FreeTextEntry1] : LBP radiating to b/l LE [de-identified] : This is a yudith 65 yrs old female who presents today for a consultation of low back pain radiating to bilateral posterior legs, worse on the left for approximately 10 years. It is associated with numbness and tingling. She has not participated in physical therapy. She does follow up with pain management, and did received a lumbar injections last month which alleviates her symptoms. Symptoms is aggravated when standing and walking, and it is alleviated with sitting. The pain is unchanged from 2 years ago. Lumbar MRI in 2018 showed L4-5 severe spinal stenosis with central disc protrusion.

## 2020-10-26 NOTE — PHYSICAL EXAM
[General Appearance - Alert] : alert [General Appearance - In No Acute Distress] : in no acute distress [General Appearance - Well Nourished] : well nourished [General Appearance - Well Developed] : well developed [General Appearance - Well-Appearing] : healthy appearing [Person] : oriented to person [Place] : oriented to place [Time] : oriented to time [Cranial Nerves Optic (II)] : visual acuity intact bilaterally,  pupils equal round and reactive to light [Cranial Nerves Oculomotor (III)] : extraocular motion intact [Cranial Nerves Trigeminal (V)] : facial sensation intact symmetrically [Cranial Nerves Facial (VII)] : face symmetrical [Cranial Nerves Vestibulocochlear (VIII)] : hearing was intact bilaterally [Cranial Nerves Glossopharyngeal (IX)] : tongue and palate midline [Cranial Nerves Accessory (XI - Cranial And Spinal)] : head turning and shoulder shrug symmetric [Cranial Nerves Hypoglossal (XII)] : there was no tongue deviation with protrusion [Motor Tone] : muscle tone was normal in all four extremities [Motor Strength] : muscle strength was normal in all four extremities [2+] : Brachioradialis left 2+ [1+] : Patella left 1+ [Straight-Leg Raise Test - Left] : straight leg raise of the left leg was positive [Straight-Leg Raise Test - Right] : straight leg raise of the right leg was positive [Antalgic] : antalgic [___] : absent on the right [___] : absent on the left [Able to toe walk] : the patient was not able to toe walk [Able to heel walk] : the patient was not able to heel walk [FreeTextEntry2] : tenderness left/right paraspinal muscles

## 2020-11-04 ENCOUNTER — APPOINTMENT (OUTPATIENT)
Dept: PAIN MANAGEMENT | Facility: CLINIC | Age: 65
End: 2020-11-04
Payer: MEDICAID

## 2020-11-04 ENCOUNTER — OUTPATIENT (OUTPATIENT)
Dept: OUTPATIENT SERVICES | Facility: HOSPITAL | Age: 65
LOS: 1 days | Discharge: HOME | End: 2020-11-04

## 2020-11-04 VITALS
DIASTOLIC BLOOD PRESSURE: 77 MMHG | WEIGHT: 207 LBS | HEIGHT: 64 IN | HEART RATE: 72 BPM | BODY MASS INDEX: 35.34 KG/M2 | SYSTOLIC BLOOD PRESSURE: 121 MMHG

## 2020-11-04 PROCEDURE — 99202 OFFICE O/P NEW SF 15 MIN: CPT | Mod: GC

## 2020-11-04 NOTE — HISTORY OF PRESENT ILLNESS
[Pain Location] : pain [] : right & left leg [Lumbar] : lumbar region [Worsening] : worsening [___ yrs] : [unfilled] year(s) ago [6] : a current pain level of 6/10 [Constant] : ~He/She~ states the symptoms seem to be constant [Bending] : worsened by bending [Lifting] : worsened by lifting [Prolonged Sitting] : worsened by prolonged sitting [Prolonged Standing] : worsened by prolonged standing [Sitting] : worsened by sitting [Standing] : worsened by standing [Walking] : worsened by walking [Weight Bearing] : worsened by weight bearing [NSAIDs] : not relieved by nonsteroidal anti-inflammatory drugs

## 2020-11-04 NOTE — REVIEW OF SYSTEMS
[Arthralgia] : arthralgia [Abdominal Pain] : abdominal pain [Negative] : Endocrine [FreeTextEntry8] : pelvic pain secondary to interstitial cystitis

## 2020-11-04 NOTE — DISCUSSION/SUMMARY
[de-identified] : 1.  The patient presents with prolong history of low back pain and radicular pain secondary to spinal stenosis.  She has already been evaluated by her PMD and neurosurgery.  The patient also had another pain physician in the past that preformed ESIs and SCS without success.  At this time prior to interventional treatment and surgery I suggested extensive PT and weight loss.  SHould the pain not improve we can reconsider ESIs.

## 2020-11-04 NOTE — PHYSICAL EXAM
[Antalgic] : antalgic [Cane] : ambulates with cane [SLR] : positive straight leg raise [UE/LE] : Sensory: Intact in bilateral upper & lower extremities [ALL] : dorsalis pedis, posterior tibial, femoral, popliteal, and radial 2+ and symmetric bilaterally [Normal RUE] : Right Upper Extremity: No scars, rashes, lesions, ulcers, skin intact [Normal LUE] : Left Upper Extremity: No scars, rashes, lesions, ulcers, skin intact [Normal RLE] : Right Lower Extremity: No scars, rashes, lesions, ulcers, skin intact [Normal LLE] : Left Lower Extremity: No scars, rashes, lesions, ulcers, skin intact [Normal Finger/nose] : finger to nose coordination [Normal Heel/Knee/Shin] : heel to knee to shin coordination [Normal NIKKO] : rapid alternative movement normal [Normal DTR Reflexes] : DTR reflexes normal [Normal Touch] : sensation intact for touch [Normal Pin] : sensation intact for pin [Normal Vibration] : sensation intact for vibration [Normal Proprioception] : sensation intact for proprioception [Normal] : Normal bowel sounds, soft, non-tender, no hepato-splenomegaly and no abdominal mass palpated

## 2020-11-05 DIAGNOSIS — M47.817 SPONDYLOSIS WITHOUT MYELOPATHY OR RADICULOPATHY, LUMBOSACRAL REGION: ICD-10-CM

## 2020-11-05 DIAGNOSIS — M48.07 SPINAL STENOSIS, LUMBOSACRAL REGION: ICD-10-CM

## 2020-11-05 DIAGNOSIS — M54.16 RADICULOPATHY, LUMBAR REGION: ICD-10-CM

## 2020-12-30 ENCOUNTER — APPOINTMENT (OUTPATIENT)
Dept: NEUROSURGERY | Facility: CLINIC | Age: 65
End: 2020-12-30
Payer: MEDICAID

## 2020-12-30 VITALS — WEIGHT: 207 LBS | BODY MASS INDEX: 35.34 KG/M2 | HEIGHT: 64 IN

## 2020-12-30 PROCEDURE — 99213 OFFICE O/P EST LOW 20 MIN: CPT

## 2020-12-30 PROCEDURE — 99072 ADDL SUPL MATRL&STAF TM PHE: CPT

## 2020-12-30 NOTE — HISTORY OF PRESENT ILLNESS
[FreeTextEntry1] : Ms. Good, who has a hx of lumbar stenosis at L4-5, presents today reporting of low back pain radiating to upper back, and groin/"bladder" pain into her abdominal region for the past 3 weeks. Denies urinary/bowel incontinence. Denies dysuria, diarrhea, vomiting. Denies leg pain, numbness, and tingling. Reports of doing home stretches at home.

## 2020-12-30 NOTE — PHYSICAL EXAM
[General Appearance - Alert] : alert [General Appearance - In No Acute Distress] : in no acute distress [General Appearance - Well Nourished] : well nourished [Person] : oriented to person [Place] : oriented to place [Time] : oriented to time [Cranial Nerves Optic (II)] : visual acuity intact bilaterally,  pupils equal round and reactive to light [Cranial Nerves Oculomotor (III)] : extraocular motion intact [Cranial Nerves Trigeminal (V)] : facial sensation intact symmetrically [Cranial Nerves Facial (VII)] : face symmetrical [Cranial Nerves Vestibulocochlear (VIII)] : hearing was intact bilaterally [Cranial Nerves Glossopharyngeal (IX)] : tongue and palate midline [Cranial Nerves Accessory (XI - Cranial And Spinal)] : head turning and shoulder shrug symmetric [Cranial Nerves Hypoglossal (XII)] : there was no tongue deviation with protrusion [Motor Tone] : muscle tone was normal in all four extremities [Motor Strength] : muscle strength was normal in all four extremities [2+] : Patella left 2+ [Straight-Leg Raise Test - Left] : straight leg raise of the left leg was negative [Straight-Leg Raise Test - Right] : straight leg raise  of the right leg was negative [L-Spine ___ (level)] : ~Ulevel [unfilled] lumbar spine [Muscle Spasms, Bilateral] : bilateral muscle spasms [Antalgic] : antalgic

## 2021-01-19 ENCOUNTER — OUTPATIENT (OUTPATIENT)
Dept: OUTPATIENT SERVICES | Facility: HOSPITAL | Age: 66
LOS: 1 days | Discharge: HOME | End: 2021-01-19
Payer: MEDICAID

## 2021-01-19 DIAGNOSIS — M48.07 SPINAL STENOSIS, LUMBOSACRAL REGION: ICD-10-CM

## 2021-01-19 PROCEDURE — 72148 MRI LUMBAR SPINE W/O DYE: CPT | Mod: 26

## 2021-01-25 ENCOUNTER — RX RENEWAL (OUTPATIENT)
Age: 66
End: 2021-01-25

## 2021-02-22 ENCOUNTER — RX RENEWAL (OUTPATIENT)
Age: 66
End: 2021-02-22

## 2021-03-19 ENCOUNTER — RX RENEWAL (OUTPATIENT)
Age: 66
End: 2021-03-19

## 2021-03-30 ENCOUNTER — EMERGENCY (EMERGENCY)
Facility: HOSPITAL | Age: 66
LOS: 1 days | Discharge: HOME | End: 2021-03-30
Attending: EMERGENCY MEDICINE | Admitting: INTERNAL MEDICINE
Payer: MEDICAID

## 2021-03-30 VITALS
DIASTOLIC BLOOD PRESSURE: 84 MMHG | HEART RATE: 80 BPM | TEMPERATURE: 98 F | WEIGHT: 205.03 LBS | HEIGHT: 64 IN | RESPIRATION RATE: 18 BRPM | OXYGEN SATURATION: 100 % | SYSTOLIC BLOOD PRESSURE: 128 MMHG

## 2021-03-30 DIAGNOSIS — R10.30 LOWER ABDOMINAL PAIN, UNSPECIFIED: ICD-10-CM

## 2021-03-30 LAB
ALBUMIN SERPL ELPH-MCNC: 4.4 G/DL — SIGNIFICANT CHANGE UP (ref 3.5–5.2)
ALP SERPL-CCNC: 85 U/L — SIGNIFICANT CHANGE UP (ref 30–115)
ALT FLD-CCNC: 14 U/L — SIGNIFICANT CHANGE UP (ref 0–41)
ANION GAP SERPL CALC-SCNC: 10 MMOL/L — SIGNIFICANT CHANGE UP (ref 7–14)
APPEARANCE UR: CLEAR — SIGNIFICANT CHANGE UP
AST SERPL-CCNC: 20 U/L — SIGNIFICANT CHANGE UP (ref 0–41)
BASOPHILS # BLD AUTO: 0.03 K/UL — SIGNIFICANT CHANGE UP (ref 0–0.2)
BASOPHILS NFR BLD AUTO: 0.5 % — SIGNIFICANT CHANGE UP (ref 0–1)
BILIRUB SERPL-MCNC: <0.2 MG/DL — SIGNIFICANT CHANGE UP (ref 0.2–1.2)
BILIRUB UR-MCNC: NEGATIVE — SIGNIFICANT CHANGE UP
BUN SERPL-MCNC: 13 MG/DL — SIGNIFICANT CHANGE UP (ref 10–20)
CALCIUM SERPL-MCNC: 9.4 MG/DL — SIGNIFICANT CHANGE UP (ref 8.5–10.1)
CHLORIDE SERPL-SCNC: 107 MMOL/L — SIGNIFICANT CHANGE UP (ref 98–110)
CO2 SERPL-SCNC: 24 MMOL/L — SIGNIFICANT CHANGE UP (ref 17–32)
COLOR SPEC: SIGNIFICANT CHANGE UP
CREAT SERPL-MCNC: 0.9 MG/DL — SIGNIFICANT CHANGE UP (ref 0.7–1.5)
DIFF PNL FLD: NEGATIVE — SIGNIFICANT CHANGE UP
EOSINOPHIL # BLD AUTO: 0.15 K/UL — SIGNIFICANT CHANGE UP (ref 0–0.7)
EOSINOPHIL NFR BLD AUTO: 2.7 % — SIGNIFICANT CHANGE UP (ref 0–8)
GLUCOSE SERPL-MCNC: 108 MG/DL — HIGH (ref 70–99)
GLUCOSE UR QL: NEGATIVE — SIGNIFICANT CHANGE UP
HCT VFR BLD CALC: 36 % — LOW (ref 37–47)
HGB BLD-MCNC: 10.8 G/DL — LOW (ref 12–16)
IMM GRANULOCYTES NFR BLD AUTO: 0.5 % — HIGH (ref 0.1–0.3)
KETONES UR-MCNC: NEGATIVE — SIGNIFICANT CHANGE UP
LACTATE SERPL-SCNC: 1.4 MMOL/L — SIGNIFICANT CHANGE UP (ref 0.7–2)
LEUKOCYTE ESTERASE UR-ACNC: ABNORMAL
LIDOCAIN IGE QN: 57 U/L — SIGNIFICANT CHANGE UP (ref 7–60)
LYMPHOCYTES # BLD AUTO: 1.86 K/UL — SIGNIFICANT CHANGE UP (ref 1.2–3.4)
LYMPHOCYTES # BLD AUTO: 32.9 % — SIGNIFICANT CHANGE UP (ref 20.5–51.1)
MCHC RBC-ENTMCNC: 21.3 PG — LOW (ref 27–31)
MCHC RBC-ENTMCNC: 30 G/DL — LOW (ref 32–37)
MCV RBC AUTO: 71 FL — LOW (ref 81–99)
MONOCYTES # BLD AUTO: 0.55 K/UL — SIGNIFICANT CHANGE UP (ref 0.1–0.6)
MONOCYTES NFR BLD AUTO: 9.7 % — HIGH (ref 1.7–9.3)
NEUTROPHILS # BLD AUTO: 3.04 K/UL — SIGNIFICANT CHANGE UP (ref 1.4–6.5)
NEUTROPHILS NFR BLD AUTO: 53.7 % — SIGNIFICANT CHANGE UP (ref 42.2–75.2)
NITRITE UR-MCNC: NEGATIVE — SIGNIFICANT CHANGE UP
NRBC # BLD: 0 /100 WBCS — SIGNIFICANT CHANGE UP (ref 0–0)
PH UR: 6 — SIGNIFICANT CHANGE UP (ref 5–8)
PLATELET # BLD AUTO: 255 K/UL — SIGNIFICANT CHANGE UP (ref 130–400)
POTASSIUM SERPL-MCNC: 4.1 MMOL/L — SIGNIFICANT CHANGE UP (ref 3.5–5)
POTASSIUM SERPL-SCNC: 4.1 MMOL/L — SIGNIFICANT CHANGE UP (ref 3.5–5)
PROT SERPL-MCNC: 7.1 G/DL — SIGNIFICANT CHANGE UP (ref 6–8)
PROT UR-MCNC: NEGATIVE — SIGNIFICANT CHANGE UP
RBC # BLD: 5.07 M/UL — SIGNIFICANT CHANGE UP (ref 4.2–5.4)
RBC # FLD: 16.7 % — HIGH (ref 11.5–14.5)
SODIUM SERPL-SCNC: 141 MMOL/L — SIGNIFICANT CHANGE UP (ref 135–146)
SP GR SPEC: 1.02 — SIGNIFICANT CHANGE UP (ref 1.01–1.03)
UROBILINOGEN FLD QL: SIGNIFICANT CHANGE UP
WBC # BLD: 5.66 K/UL — SIGNIFICANT CHANGE UP (ref 4.8–10.8)
WBC # FLD AUTO: 5.66 K/UL — SIGNIFICANT CHANGE UP (ref 4.8–10.8)

## 2021-03-30 PROCEDURE — 99285 EMERGENCY DEPT VISIT HI MDM: CPT

## 2021-03-30 RX ORDER — KETOROLAC TROMETHAMINE 30 MG/ML
30 SYRINGE (ML) INJECTION ONCE
Refills: 0 | Status: DISCONTINUED | OUTPATIENT
Start: 2021-03-30 | End: 2021-03-30

## 2021-03-30 RX ADMIN — Medication 30 MILLIGRAM(S): at 22:08

## 2021-03-30 NOTE — ED PROVIDER NOTE - PATIENT PORTAL LINK FT
You can access the FollowMyHealth Patient Portal offered by [facility] by registering at the following website: [email or web address]. By joining [employer]’s FollowMyHealth portal, you will also be able to view your health information using other applications (apps) compatible with our system.

## 2021-03-30 NOTE — ED PROVIDER NOTE - OBJECTIVE STATEMENT
66F h/o rectocele s/p pessary & removal, ?neurostim in past s/p removal p/w lower abd pain x 1 week. Bl, constant, aching/burning, no other sx, unable to sleep. No f/c, uri sx, cp/sob, nvd, melena, brbpr, flank pain, urinary sx, rash. No prior abd surgeries. PCP Fatoumata.

## 2021-03-30 NOTE — ED PROVIDER NOTE - CARE PROVIDER_API CALL
Fatoumata,   your PCP Dr. Ham  Phone: (   )    -  Fax: (   )    -  Established Patient  Follow Up Time: 7-10 Days

## 2021-03-30 NOTE — ED PROVIDER NOTE - PROVIDER TOKENS
FREE:[LAST:[Fatoumata],PHONE:[(   )    -],FAX:[(   )    -],ADDRESS:[your PCP Dr. Ham],FOLLOWUP:[7-10 Days],ESTABLISHEDPATIENT:[T]]

## 2021-03-30 NOTE — ED PROVIDER NOTE - CLINICAL SUMMARY MEDICAL DECISION MAKING FREE TEXT BOX
lower abd pain, ua +le, labs wnl, ct ap showing cystitis - abx/pyridium, all results d/w pt & copies given, strict return precautions discussed, rec outpt PCP f/u

## 2021-03-31 LAB
BACTERIA # UR AUTO: NEGATIVE — SIGNIFICANT CHANGE UP
EPI CELLS # UR: 5 /HPF — SIGNIFICANT CHANGE UP (ref 0–5)
HYALINE CASTS # UR AUTO: 1 /LPF — SIGNIFICANT CHANGE UP (ref 0–7)
RBC CASTS # UR COMP ASSIST: 6 /HPF — HIGH (ref 0–4)
WBC UR QL: 6 /HPF — HIGH (ref 0–5)

## 2021-03-31 PROCEDURE — 74177 CT ABD & PELVIS W/CONTRAST: CPT | Mod: 26

## 2021-03-31 RX ORDER — CEFPODOXIME PROXETIL 100 MG
100 TABLET ORAL ONCE
Refills: 0 | Status: COMPLETED | OUTPATIENT
Start: 2021-03-31 | End: 2021-03-31

## 2021-03-31 RX ORDER — PHENAZOPYRIDINE HCL 100 MG
1 TABLET ORAL
Qty: 6 | Refills: 0
Start: 2021-03-31 | End: 2021-04-01

## 2021-03-31 RX ORDER — CEFPODOXIME PROXETIL 100 MG
1 TABLET ORAL
Qty: 14 | Refills: 0
Start: 2021-03-31 | End: 2021-04-06

## 2021-03-31 RX ORDER — MORPHINE SULFATE 50 MG/1
4 CAPSULE, EXTENDED RELEASE ORAL ONCE
Refills: 0 | Status: DISCONTINUED | OUTPATIENT
Start: 2021-03-31 | End: 2021-03-31

## 2021-03-31 RX ADMIN — Medication 100 MILLIGRAM(S): at 02:53

## 2021-03-31 RX ADMIN — MORPHINE SULFATE 4 MILLIGRAM(S): 50 CAPSULE, EXTENDED RELEASE ORAL at 01:12

## 2021-03-31 NOTE — ED ADULT NURSE NOTE - PAIN RATING/NUMBER SCALE (0-10): ACTIVITY
0 Complex Repair And O-T Advancement Flap Text: The defect edges were debeveled with a #15 scalpel blade.  The primary defect was closed partially with a complex linear closure.  Given the location of the remaining defect, shape of the defect and the proximity to free margins an O-T advancement flap was deemed most appropriate for complete closure of the defect.  Using a sterile surgical marker, an appropriate advancement flap was drawn incorporating the defect and placing the expected incisions within the relaxed skin tension lines where possible.    The area thus outlined was incised deep to adipose tissue with a #15 scalpel blade.  The skin margins were undermined to an appropriate distance in all directions utilizing iris scissors.

## 2021-03-31 NOTE — ED ADULT NURSE NOTE - OBJECTIVE STATEMENT
pt is a 66 F pw lower abd pain x 1 week. Bl, constant, aching/burning, unable to sleep. No f/c, uri sx, cp/sob, nvd, melena, brbpr, flank pain, urinary sx, rash. No prior abd surgeries. abd ntnd soft.

## 2021-04-02 LAB
-  AMIKACIN: SIGNIFICANT CHANGE UP
-  AMOXICILLIN/CLAVULANIC ACID: SIGNIFICANT CHANGE UP
-  AMPICILLIN/SULBACTAM: SIGNIFICANT CHANGE UP
-  AMPICILLIN: SIGNIFICANT CHANGE UP
-  AZTREONAM: SIGNIFICANT CHANGE UP
-  CEFAZOLIN: SIGNIFICANT CHANGE UP
-  CEFEPIME: SIGNIFICANT CHANGE UP
-  CEFOXITIN: SIGNIFICANT CHANGE UP
-  CEFTRIAXONE: SIGNIFICANT CHANGE UP
-  CIPROFLOXACIN: SIGNIFICANT CHANGE UP
-  ERTAPENEM: SIGNIFICANT CHANGE UP
-  GENTAMICIN: SIGNIFICANT CHANGE UP
-  LEVOFLOXACIN: SIGNIFICANT CHANGE UP
-  MEROPENEM: SIGNIFICANT CHANGE UP
-  NITROFURANTOIN: SIGNIFICANT CHANGE UP
-  PIPERACILLIN/TAZOBACTAM: SIGNIFICANT CHANGE UP
-  TOBRAMYCIN: SIGNIFICANT CHANGE UP
-  TRIMETHOPRIM/SULFAMETHOXAZOLE: SIGNIFICANT CHANGE UP
CULTURE RESULTS: SIGNIFICANT CHANGE UP
METHOD TYPE: SIGNIFICANT CHANGE UP
ORGANISM # SPEC MICROSCOPIC CNT: SIGNIFICANT CHANGE UP
ORGANISM # SPEC MICROSCOPIC CNT: SIGNIFICANT CHANGE UP
SPECIMEN SOURCE: SIGNIFICANT CHANGE UP

## 2021-04-05 ENCOUNTER — OUTPATIENT (OUTPATIENT)
Dept: OUTPATIENT SERVICES | Facility: HOSPITAL | Age: 66
LOS: 1 days | Discharge: HOME | End: 2021-04-05

## 2021-04-05 ENCOUNTER — APPOINTMENT (OUTPATIENT)
Dept: INTERNAL MEDICINE | Facility: CLINIC | Age: 66
End: 2021-04-05
Payer: MEDICAID

## 2021-04-05 VITALS
WEIGHT: 206 LBS | SYSTOLIC BLOOD PRESSURE: 149 MMHG | BODY MASS INDEX: 35.17 KG/M2 | OXYGEN SATURATION: 98 % | HEART RATE: 77 BPM | TEMPERATURE: 97.2 F | DIASTOLIC BLOOD PRESSURE: 88 MMHG | HEIGHT: 64 IN

## 2021-04-05 DIAGNOSIS — R10.9 UNSPECIFIED ABDOMINAL PAIN: ICD-10-CM

## 2021-04-05 DIAGNOSIS — E66.9 OBESITY, UNSPECIFIED: ICD-10-CM

## 2021-04-05 DIAGNOSIS — R10.30 LOWER ABDOMINAL PAIN, UNSPECIFIED: ICD-10-CM

## 2021-04-05 DIAGNOSIS — N30.10 INTERSTITIAL CYSTITIS (CHRONIC) WITHOUT HEMATURIA: ICD-10-CM

## 2021-04-05 DIAGNOSIS — E78.5 HYPERLIPIDEMIA, UNSPECIFIED: ICD-10-CM

## 2021-04-05 DIAGNOSIS — M48.00 SPINAL STENOSIS, SITE UNSPECIFIED: ICD-10-CM

## 2021-04-05 DIAGNOSIS — M54.16 RADICULOPATHY, LUMBAR REGION: ICD-10-CM

## 2021-04-05 DIAGNOSIS — N30.90 CYSTITIS, UNSPECIFIED WITHOUT HEMATURIA: ICD-10-CM

## 2021-04-05 PROCEDURE — 99214 OFFICE O/P EST MOD 30 MIN: CPT | Mod: GC

## 2021-04-05 NOTE — HISTORY OF PRESENT ILLNESS
[FreeTextEntry1] : Abdominal pain [de-identified] : 66 years old female with PMHx of interstitial cytsitis, spinal stenosis came in for follow up, complaining of lower abdominal pain. Went to ED last week due to severe epigastric pain, given Morphine, CT A/P showed cystitis, discharged on Vantin.\par Denies fevers, chills, Has not followed up with urology in a while.

## 2021-04-05 NOTE — REVIEW OF SYSTEMS
[Abdominal Pain] : abdominal pain [Dysuria] : dysuria [Fever] : no fever [Chills] : no chills [Pain] : no pain [Redness] : no redness [Earache] : no earache [Hearing Loss] : no hearing loss [Chest Pain] : no chest pain [Palpitations] : no palpitations [Lower Ext Edema] : no lower extremity edema [Vomiting] : no vomiting [Heartburn] : no heartburn [Incontinence] : no incontinence [Nocturia] : no nocturia [Hematuria] : no hematuria [Frequency] : no frequency [Joint Pain] : no joint pain [Joint Stiffness] : no joint stiffness [Joint Swelling] : no joint swelling [Muscle Weakness] : no muscle weakness [Itching] : no itching [Mole Changes] : no mole changes [Headache] : no headache [Dizziness] : no dizziness [Fainting] : no fainting

## 2021-04-05 NOTE — ASSESSMENT
[FreeTextEntry1] : 66 years old female with PMHx of interstitial cytsitis, spinal stenosis came in for follow up, complaining of lower abdominal pain.\par \par #Interestial cystis\par Refer back to Uro-gyn  \par Restart Elmiron 100mg TID \par \par #Severe back pain 2/2 severe spinal stenosis at L 4- L5\par Had MRI done, will refer back to neurosurgery\par  \par #Dyslipidemia\par ACSVC score 7.2%\par Will start on Simvastatin 10mg qhs \par \par #Anemia- microcytic\par Thalesmia workup is non remarkable\par Iron studies wnl\par \par #health care maintenance\par mammogram 2020 Birads 1\par GI following, last colonscopy in 2015,\par follow up in 3 months with repeat labs and prn.

## 2021-04-05 NOTE — PLAN
[FreeTextEntry1] : #interestial cystis\par - Urogyn is following \par - started on Elmiron 100mg but not helping \par \par # severe back pain 2/2 severe spinal stenosis at L 4- L5\par  -will refer to neurosurgery\par  - Pain medicine consult\par \par # GERD- will start PPI\par  \par # anemia- microcytic\par - Thalesmia workup is non remarkable\par - Iron studies done, will repeat\par - Will repeat CBC\par - GI following, no need for repeat Colonscopy repeat for now\par \par # DLD- not on any medication \par  will recheck DLD\par \par # health care maintenance\par mammogram will order\par as per patient she took flu shot this year \par GI following, last colonscopy in 2015,\par \par # follow up in 3 months and prn. \par

## 2021-04-05 NOTE — PHYSICAL EXAM
[No Acute Distress] : no acute distress [Well Nourished] : well nourished [Normal Sclera/Conjunctiva] : normal sclera/conjunctiva [PERRL] : pupils equal round and reactive to light [Normal Outer Ear/Nose] : the outer ears and nose were normal in appearance [Normal Oropharynx] : the oropharynx was normal [No JVD] : no jugular venous distention [No Respiratory Distress] : no respiratory distress  [No Accessory Muscle Use] : no accessory muscle use [Clear to Auscultation] : lungs were clear to auscultation bilaterally [Normal Rate] : normal rate  [Regular Rhythm] : with a regular rhythm [Normal S1, S2] : normal S1 and S2 [No Murmur] : no murmur heard [Soft] : abdomen soft [No CVA Tenderness] : no CVA  tenderness [No Spinal Tenderness] : no spinal tenderness [No Rash] : no rash [Coordination Grossly Intact] : coordination grossly intact [Normal Gait] : normal gait [de-identified] : tender in lower quadrants

## 2021-04-27 ENCOUNTER — NON-APPOINTMENT (OUTPATIENT)
Age: 66
End: 2021-04-27

## 2021-05-04 ENCOUNTER — OUTPATIENT (OUTPATIENT)
Dept: OUTPATIENT SERVICES | Facility: HOSPITAL | Age: 66
LOS: 1 days | Discharge: HOME | End: 2021-05-04

## 2021-05-04 ENCOUNTER — APPOINTMENT (OUTPATIENT)
Dept: UROGYNECOLOGY | Facility: CLINIC | Age: 66
End: 2021-05-04
Payer: MEDICAID

## 2021-05-04 VITALS
HEIGHT: 64 IN | WEIGHT: 206 LBS | BODY MASS INDEX: 35.17 KG/M2 | SYSTOLIC BLOOD PRESSURE: 124 MMHG | DIASTOLIC BLOOD PRESSURE: 86 MMHG

## 2021-05-04 PROCEDURE — 99214 OFFICE O/P EST MOD 30 MIN: CPT | Mod: 25

## 2021-05-04 PROCEDURE — 51702 INSERT TEMP BLADDER CATH: CPT

## 2021-05-04 NOTE — PHYSICAL EXAM
[Chaperone Present] : A chaperone was present in the examining room during all aspects of the physical examination [FreeTextEntry1] : Urethra was prepped in sterile fashion and then a sterile catheter was used by me to drain the bladder.\par cath: 80cc, dark urine\par \par + rectocele\par +cystocele\par neg cough stress test\par Speculum exam: no lesions, no discharge [No Acute Distress] : in no acute distress [Well developed] : well developed [Well Nourished] : ~L well nourished

## 2021-05-04 NOTE — COUNSELING
[FreeTextEntry1] : If you feel like you have an infection it is important for you to call our office and we will arrange testing of your urine.\par \par We will contact you if the urine results are abnormal.\par \par Please begin taking Elmiron 100 mg three times a day. It takes up to 6 weeks to go into full effect. Please  your refill when you complete the 1st bottle.\par \par Please call my office if you have any issues with the cost or side effects of the medication. \par \par Schedule a 6 weeks follow up med check appointment.\par

## 2021-05-04 NOTE — HISTORY OF PRESENT ILLNESS
[FreeTextEntry1] : Patient is here as an ER follow up for bladder pain.\par Last seen on 8/14/2020 for med check.\par \par s/p Flexeril 5 mg (no improvement)\par s/p Flexeril 10 mg (no improvement)\par s/p Baclofen 20 mg (continued to have pain)\par Estrace cream for atrophy\par Gabapentin 100 mg increased to 200 mg at bedtime for frequency (no improvement)\par Zanaflex 2 mg BID for myalgia (no improvement)\par \par s/p Ring and Support #4 for rectocele (bothersome)\par \par s/p Zanaflex 4mg: no benefit\par s/p Gabapentin 100mg TID: minimal benefit \par \par s/p bladder instillation x3 for bladder pain: no benefit\par \par Elmiron 100mg TID\par \par 3/31/2021 ER visit for pain.\par 3/31/21: CT scan of abdomen/pelvis: bladder wall thickening with fat stranding, cystitis\par \par \par Today, patient states that she is having vaginal burning and pain x 2 months. Was seen in the ER and given antibiotics for urine infection last month. Pt states that she is not taking Elmiron for few months as it finished. C/o pelvic pain x 2 months as well. Denies vaginal bleeding or discharge. Denies dysuria, hematuria or fever. Patient does not feel she has an infection. C/o something prolapsing through vaginal when she is walking. \par \par \par

## 2021-05-04 NOTE — DISCUSSION/SUMMARY
[FreeTextEntry1] : Interstitial Cystitis\par Restart Rx Elmiron 100mg TID \par Precautions reviewed.\par Urine culture obtained.\par Will follow up.\par Will treat accordingly if necessary\par Will return in 6 weeks for follow up or earlier if she has any issues.\par \par Cystocele\par Discussed pessary management. Pt is not interested\par \par Rectocele\par Discussed pessary management vs surgery. Pt is not interested

## 2021-05-06 DIAGNOSIS — N81.11 CYSTOCELE, MIDLINE: ICD-10-CM

## 2021-05-06 DIAGNOSIS — N81.6 RECTOCELE: ICD-10-CM

## 2021-05-06 DIAGNOSIS — N30.10 INTERSTITIAL CYSTITIS (CHRONIC) WITHOUT HEMATURIA: ICD-10-CM

## 2021-05-06 LAB — BACTERIA UR CULT: NORMAL

## 2021-05-10 ENCOUNTER — EMERGENCY (EMERGENCY)
Facility: HOSPITAL | Age: 66
LOS: 0 days | Discharge: HOME | End: 2021-05-10
Attending: STUDENT IN AN ORGANIZED HEALTH CARE EDUCATION/TRAINING PROGRAM | Admitting: EMERGENCY MEDICINE
Payer: MEDICAID

## 2021-05-10 VITALS
RESPIRATION RATE: 18 BRPM | DIASTOLIC BLOOD PRESSURE: 62 MMHG | SYSTOLIC BLOOD PRESSURE: 148 MMHG | HEART RATE: 75 BPM | TEMPERATURE: 97 F | OXYGEN SATURATION: 98 %

## 2021-05-10 VITALS
SYSTOLIC BLOOD PRESSURE: 162 MMHG | RESPIRATION RATE: 17 BRPM | HEART RATE: 81 BPM | DIASTOLIC BLOOD PRESSURE: 75 MMHG | HEIGHT: 64 IN | TEMPERATURE: 98 F | OXYGEN SATURATION: 99 % | WEIGHT: 202.6 LBS

## 2021-05-10 DIAGNOSIS — R10.84 GENERALIZED ABDOMINAL PAIN: ICD-10-CM

## 2021-05-10 DIAGNOSIS — R11.10 VOMITING, UNSPECIFIED: ICD-10-CM

## 2021-05-10 LAB
ALBUMIN SERPL ELPH-MCNC: 4.3 G/DL — SIGNIFICANT CHANGE UP (ref 3.5–5.2)
ALP SERPL-CCNC: 76 U/L — SIGNIFICANT CHANGE UP (ref 30–115)
ALT FLD-CCNC: 15 U/L — SIGNIFICANT CHANGE UP (ref 0–41)
ANION GAP SERPL CALC-SCNC: 9 MMOL/L — SIGNIFICANT CHANGE UP (ref 7–14)
APPEARANCE UR: CLEAR — SIGNIFICANT CHANGE UP
AST SERPL-CCNC: 26 U/L — SIGNIFICANT CHANGE UP (ref 0–41)
BASOPHILS # BLD AUTO: 0.03 K/UL — SIGNIFICANT CHANGE UP (ref 0–0.2)
BASOPHILS NFR BLD AUTO: 0.6 % — SIGNIFICANT CHANGE UP (ref 0–1)
BILIRUB SERPL-MCNC: 0.2 MG/DL — SIGNIFICANT CHANGE UP (ref 0.2–1.2)
BILIRUB UR-MCNC: NEGATIVE — SIGNIFICANT CHANGE UP
BUN SERPL-MCNC: 15 MG/DL — SIGNIFICANT CHANGE UP (ref 10–20)
CALCIUM SERPL-MCNC: 9.4 MG/DL — SIGNIFICANT CHANGE UP (ref 8.5–10.1)
CHLORIDE SERPL-SCNC: 106 MMOL/L — SIGNIFICANT CHANGE UP (ref 98–110)
CO2 SERPL-SCNC: 26 MMOL/L — SIGNIFICANT CHANGE UP (ref 17–32)
COLOR SPEC: SIGNIFICANT CHANGE UP
CREAT SERPL-MCNC: 0.9 MG/DL — SIGNIFICANT CHANGE UP (ref 0.7–1.5)
DIFF PNL FLD: NEGATIVE — SIGNIFICANT CHANGE UP
EOSINOPHIL # BLD AUTO: 0.14 K/UL — SIGNIFICANT CHANGE UP (ref 0–0.7)
EOSINOPHIL NFR BLD AUTO: 3 % — SIGNIFICANT CHANGE UP (ref 0–8)
GLUCOSE SERPL-MCNC: 103 MG/DL — HIGH (ref 70–99)
GLUCOSE UR QL: NEGATIVE — SIGNIFICANT CHANGE UP
HCT VFR BLD CALC: 35.6 % — LOW (ref 37–47)
HGB BLD-MCNC: 10.7 G/DL — LOW (ref 12–16)
IMM GRANULOCYTES NFR BLD AUTO: 0.2 % — SIGNIFICANT CHANGE UP (ref 0.1–0.3)
KETONES UR-MCNC: NEGATIVE — SIGNIFICANT CHANGE UP
LACTATE SERPL-SCNC: 1.3 MMOL/L — SIGNIFICANT CHANGE UP (ref 0.7–2)
LEUKOCYTE ESTERASE UR-ACNC: NEGATIVE — SIGNIFICANT CHANGE UP
LIDOCAIN IGE QN: 49 U/L — SIGNIFICANT CHANGE UP (ref 7–60)
LYMPHOCYTES # BLD AUTO: 1.76 K/UL — SIGNIFICANT CHANGE UP (ref 1.2–3.4)
LYMPHOCYTES # BLD AUTO: 37.6 % — SIGNIFICANT CHANGE UP (ref 20.5–51.1)
MCHC RBC-ENTMCNC: 21.2 PG — LOW (ref 27–31)
MCHC RBC-ENTMCNC: 30.1 G/DL — LOW (ref 32–37)
MCV RBC AUTO: 70.6 FL — LOW (ref 81–99)
MONOCYTES # BLD AUTO: 0.61 K/UL — HIGH (ref 0.1–0.6)
MONOCYTES NFR BLD AUTO: 13 % — HIGH (ref 1.7–9.3)
NEUTROPHILS # BLD AUTO: 2.13 K/UL — SIGNIFICANT CHANGE UP (ref 1.4–6.5)
NEUTROPHILS NFR BLD AUTO: 45.6 % — SIGNIFICANT CHANGE UP (ref 42.2–75.2)
NITRITE UR-MCNC: NEGATIVE — SIGNIFICANT CHANGE UP
NRBC # BLD: 0 /100 WBCS — SIGNIFICANT CHANGE UP (ref 0–0)
PH UR: 7 — SIGNIFICANT CHANGE UP (ref 5–8)
PLATELET # BLD AUTO: 275 K/UL — SIGNIFICANT CHANGE UP (ref 130–400)
POTASSIUM SERPL-MCNC: 4.3 MMOL/L — SIGNIFICANT CHANGE UP (ref 3.5–5)
POTASSIUM SERPL-SCNC: 4.3 MMOL/L — SIGNIFICANT CHANGE UP (ref 3.5–5)
PROT SERPL-MCNC: 6.7 G/DL — SIGNIFICANT CHANGE UP (ref 6–8)
PROT UR-MCNC: SIGNIFICANT CHANGE UP
RBC # BLD: 5.04 M/UL — SIGNIFICANT CHANGE UP (ref 4.2–5.4)
RBC # FLD: 15.7 % — HIGH (ref 11.5–14.5)
SODIUM SERPL-SCNC: 141 MMOL/L — SIGNIFICANT CHANGE UP (ref 135–146)
SP GR SPEC: 1.05 — HIGH (ref 1.01–1.03)
TROPONIN T SERPL-MCNC: <0.01 NG/ML — SIGNIFICANT CHANGE UP
UROBILINOGEN FLD QL: SIGNIFICANT CHANGE UP
WBC # BLD: 4.68 K/UL — LOW (ref 4.8–10.8)
WBC # FLD AUTO: 4.68 K/UL — LOW (ref 4.8–10.8)

## 2021-05-10 PROCEDURE — 93010 ELECTROCARDIOGRAM REPORT: CPT

## 2021-05-10 PROCEDURE — 99285 EMERGENCY DEPT VISIT HI MDM: CPT

## 2021-05-10 PROCEDURE — 74177 CT ABD & PELVIS W/CONTRAST: CPT | Mod: 26,MA

## 2021-05-10 RX ORDER — MORPHINE SULFATE 50 MG/1
4 CAPSULE, EXTENDED RELEASE ORAL ONCE
Refills: 0 | Status: DISCONTINUED | OUTPATIENT
Start: 2021-05-10 | End: 2021-05-10

## 2021-05-10 RX ORDER — FAMOTIDINE 10 MG/ML
20 INJECTION INTRAVENOUS ONCE
Refills: 0 | Status: COMPLETED | OUTPATIENT
Start: 2021-05-10 | End: 2021-05-10

## 2021-05-10 RX ORDER — SODIUM CHLORIDE 9 MG/ML
2800 INJECTION, SOLUTION INTRAVENOUS ONCE
Refills: 0 | Status: COMPLETED | OUTPATIENT
Start: 2021-05-10 | End: 2021-05-10

## 2021-05-10 RX ADMIN — MORPHINE SULFATE 4 MILLIGRAM(S): 50 CAPSULE, EXTENDED RELEASE ORAL at 17:16

## 2021-05-10 RX ADMIN — SODIUM CHLORIDE 2800 MILLILITER(S): 9 INJECTION, SOLUTION INTRAVENOUS at 17:19

## 2021-05-10 RX ADMIN — FAMOTIDINE 20 MILLIGRAM(S): 10 INJECTION INTRAVENOUS at 19:46

## 2021-05-10 NOTE — ED PROVIDER NOTE - OBJECTIVE STATEMENT
66 year old female with a p mh of a rectocele s/p pessary & removal presents here c/o generalized abdominal pain x 2 weeks. pain is constant goes throughout her abdomen 10/10 associated with an episode of vomiting on friday and 1 episode last tuesday. Patient had a normal bowel movement yesterday. No fever chills cough cp sob diarrhea urinary frequency urgency or burning.

## 2021-05-10 NOTE — ED PROVIDER NOTE - PROVIDER TOKENS
PROVIDER:[TOKEN:[[id number]],FOLLOWUP:[1-3 Days]],PROVIDER:[TOKEN:[[id number]],FOLLOWUP:[1-3 Days]]

## 2021-05-10 NOTE — ED PROVIDER NOTE - CLINICAL SUMMARY MEDICAL DECISION MAKING FREE TEXT BOX
66 year old female with a p mh of a rectocele s/p pessary & removal presents here c/o generalized abdominal pain x 2 weeks. pain is constant goes throughout her abdomen 10/10 associated with an episode of vomiting on friday and 1 episode last tuesday. Patient had a normal bowel movement yesterday. No fever chills cough cp sob diarrhea urinary frequency urgency or burning. VS reviewed. Labs imaging ekg obtained and reviewed. PAtient felt better. Patient a spoken to in detail about results  All questions addressed.  Results of ED work up discussed and patient given a copy of the results. Patient has proper follow up. Return precautions given.  Patient to follow up with GI.

## 2021-05-10 NOTE — ED PROVIDER NOTE - CARE PROVIDERS DIRECT ADDRESSES
[email or web address]

## 2021-05-10 NOTE — ED PROVIDER NOTE - PHYSICAL EXAMINATION
CONSTITUTIONAL: WA / WN / NAD  HEAD: NCAT  EYES: PERRL; EOMI;   ENT: Normal pharynx; mucous membranes pink/moist, no erythema.  NECK: Supple; no meningeal signs  CARD: RRR; nl S1/S2; no M/R/G.   RESP: Respiratory rate and effort are normal; breath sounds clear and equal bilaterally.  ABD: Soft, ND + llq & suprapubic ttp  MSK/EXT: No gross deformities; full range of motion.  SKIN: Warm and dry;   NEURO: AAOx3  PSYCH: Memory Intact, Normal Affect

## 2021-05-10 NOTE — ED PROVIDER NOTE - PROGRESS NOTE DETAILS
SR: patient feeling better. CT results discussed with patient about lung findings, patient denies fever or cough, chills. Patient known to follow up with repeat imaging.

## 2021-05-10 NOTE — ED PROVIDER NOTE - CARE PROVIDER_API CALL
Man Guo)  Gastroenterology; Internal Medicine  [address] [place] [address]  Phone: [phone]  Fax: [phone]  Follow Up Time: 1-3 Days    Matilda Mireles)  Gastroenterology  [address] [place] [address]  Phone: [phone]  Fax: [phone]  Follow Up Time: 1-3 Days

## 2021-05-10 NOTE — ED PROVIDER NOTE - PATIENT PORTAL LINK FT
You can access the FollowMyHealth Patient Portal offered by [facility] by registering at the following website: [email or web address]. By joining [facility]’s FollowMyHealth portal, you will also be able to view your health information using other applications (apps) compatible with our system.

## 2021-05-10 NOTE — ED ADULT NURSE NOTE - NSIMPLEMENTINTERV_GEN_ALL_ED
Implemented All Fall with Harm Risk Interventions:  [place] to call system. Call bell, personal items and telephone within reach. Instruct patient to call for assistance. Room bathroom lighting operational. Non-slip footwear when patient is off stretcher. Physically safe environment: no spills, clutter or unnecessary equipment. Stretcher in lowest position, wheels locked, appropriate side rails in place. Provide visual cue, wrist band, yellow gown, etc. Monitor gait and stability. Monitor for mental status changes and reorient to person, place, and time. Review medications for side effects contributing to fall risk. Reinforce activity limits and safety measures with patient and family. Provide visual clues: red socks.

## 2021-05-10 NOTE — ED PROVIDER NOTE - NS ED ROS FT
Constitutional: See HPI.  Eyes: No visual changes,  ENMT: No neck pain or stiffness. No limited ROM  Cardiac: No SOB or cp  Respiratory: No cough   GI: see hpi  : No dysuria, frequency or burning. No Discharge  MS: No myalgia, muscle weakness, joint pain or back pain.  Psych: No suicidal or homicidal ideations.  Neuro: No headache  Skin: No skin rash.

## 2021-05-11 LAB
CULTURE RESULTS: SIGNIFICANT CHANGE UP
SPECIMEN SOURCE: SIGNIFICANT CHANGE UP

## 2021-06-15 ENCOUNTER — APPOINTMENT (OUTPATIENT)
Dept: UROGYNECOLOGY | Facility: CLINIC | Age: 66
End: 2021-06-15
Payer: MEDICAID

## 2021-06-15 ENCOUNTER — OUTPATIENT (OUTPATIENT)
Dept: OUTPATIENT SERVICES | Facility: HOSPITAL | Age: 66
LOS: 1 days | Discharge: HOME | End: 2021-06-15

## 2021-06-15 VITALS
BODY MASS INDEX: 35.17 KG/M2 | DIASTOLIC BLOOD PRESSURE: 80 MMHG | SYSTOLIC BLOOD PRESSURE: 132 MMHG | HEIGHT: 64 IN | WEIGHT: 206 LBS

## 2021-06-15 PROCEDURE — 99214 OFFICE O/P EST MOD 30 MIN: CPT

## 2021-06-15 NOTE — DISCUSSION/SUMMARY
[FreeTextEntry1] : Cystocele/Rectocele\par Discussed surgical options with pt and her son. Pt would like to proceed with surgery at this time. \par Schedule UDS with reduction and surgical counseling with Dr Lovelace and consent signing after UDS. \par \par Interstitial cystitis\par Rx Elmiron 100mg TID with 5 refills\par Follow up 6 months for med check.

## 2021-06-15 NOTE — COUNSELING
[FreeTextEntry1] : If you feel like you have an infection it is important for you to call our office and we will arrange testing of your urine.\par \par Please continue taking Elmiron 100 mg three times a day for frequency. Refills sent to your pharmacy.\par \par Schedule bladder function testing (UDS without reduction) with my PA Tania. \par \par Please call the office if you feel like you have an infection because we cannot do the bladder function testing in the setting of an infection.\par \par Please come with a full, not painful bladder.\par \par Please schedule an appointment with Dr Lovelace for surgical counseling and consent signing after UDS. \par \par \par \par

## 2021-06-15 NOTE — HISTORY OF PRESENT ILLNESS
[FreeTextEntry1] : Patient is here for 6 weeks med check for bladder pain. pt has her son on the phone helping to translate for her. \par Last seen on 5/4/21 for med check.\par \par s/p Flexeril 5 mg (no improvement)\par s/p Flexeril 10 mg (no improvement)\par s/p Baclofen 20 mg (continued to have pain)\par Estrace cream for atrophy\par Gabapentin 100 mg increased to 200 mg at bedtime for frequency (no improvement)\par Zanaflex 2 mg BID for myalgia (no improvement)\par \par s/p Ring and Support #4 for rectocele (bothersome)\par \par s/p Zanaflex 4mg: no benefit\par s/p Gabapentin 100mg TID: minimal benefit \par \par s/p bladder instillation x3 for bladder pain: no benefit\par \par 3/31/2021 ER visit for pain.\par 3/31/21: CT scan of abdomen/pelvis: bladder wall thickening with fat stranding, cystitis\par \par Elmiron 100mg TID\par \par Today, patient states she is happy with Elmiron 100 mg TID and is noticing improvement. Denies side effects. Patient does not feel she has an infection. She's still c/o prolapse bothering her very much when she is walking. Does not want pessary as she tried it before and was not comfortable.  \par \par Patient would like to continue Elmiron 100mg TID.\par

## 2021-06-18 DIAGNOSIS — N81.6 RECTOCELE: ICD-10-CM

## 2021-06-18 DIAGNOSIS — N30.10 INTERSTITIAL CYSTITIS (CHRONIC) WITHOUT HEMATURIA: ICD-10-CM

## 2021-06-18 DIAGNOSIS — N81.11 CYSTOCELE, MIDLINE: ICD-10-CM

## 2021-07-21 ENCOUNTER — OUTPATIENT (OUTPATIENT)
Dept: OUTPATIENT SERVICES | Facility: HOSPITAL | Age: 66
LOS: 1 days | Discharge: HOME | End: 2021-07-21

## 2021-07-21 ENCOUNTER — APPOINTMENT (OUTPATIENT)
Dept: UROGYNECOLOGY | Facility: CLINIC | Age: 66
End: 2021-07-21
Payer: MEDICAID

## 2021-07-21 VITALS — SYSTOLIC BLOOD PRESSURE: 140 MMHG | DIASTOLIC BLOOD PRESSURE: 80 MMHG

## 2021-07-21 PROCEDURE — 51797 INTRAABDOMINAL PRESSURE TEST: CPT | Mod: 26

## 2021-07-21 PROCEDURE — 51784 ANAL/URINARY MUSCLE STUDY: CPT | Mod: 26

## 2021-07-21 PROCEDURE — 51728 CYSTOMETROGRAM W/VP: CPT | Mod: 26

## 2021-07-21 PROCEDURE — 51741 ELECTRO-UROFLOWMETRY FIRST: CPT | Mod: 26

## 2021-07-23 ENCOUNTER — APPOINTMENT (OUTPATIENT)
Dept: UROGYNECOLOGY | Facility: CLINIC | Age: 66
End: 2021-07-23
Payer: MEDICAID

## 2021-07-23 ENCOUNTER — OUTPATIENT (OUTPATIENT)
Dept: OUTPATIENT SERVICES | Facility: HOSPITAL | Age: 66
LOS: 1 days | Discharge: HOME | End: 2021-07-23

## 2021-07-23 VITALS
SYSTOLIC BLOOD PRESSURE: 120 MMHG | HEIGHT: 64 IN | DIASTOLIC BLOOD PRESSURE: 76 MMHG | BODY MASS INDEX: 35.17 KG/M2 | WEIGHT: 206 LBS

## 2021-07-23 DIAGNOSIS — M47.817 SPONDYLOSIS W/OUT MYELOPATHY OR RADICULOPATHY, LUMBOSACRAL REGION: ICD-10-CM

## 2021-07-23 DIAGNOSIS — N81.11 CYSTOCELE, MIDLINE: ICD-10-CM

## 2021-07-23 DIAGNOSIS — Z87.39 PERSONAL HISTORY OF OTHER DISEASES OF THE MUSCULOSKELETAL SYSTEM AND CONNECTIVE TISSUE: ICD-10-CM

## 2021-07-23 DIAGNOSIS — Z87.898 PERSONAL HISTORY OF OTHER SPECIFIED CONDITIONS: ICD-10-CM

## 2021-07-23 DIAGNOSIS — Z86.2 PERSONAL HISTORY OF DISEASES OF THE BLOOD AND BLOOD-FORMING ORGANS AND CERTAIN DISORDERS INVOLVING THE IMMUNE MECHANISM: ICD-10-CM

## 2021-07-23 DIAGNOSIS — Z87.19 PERSONAL HISTORY OF OTHER DISEASES OF THE DIGESTIVE SYSTEM: ICD-10-CM

## 2021-07-23 DIAGNOSIS — M54.16 RADICULOPATHY, LUMBAR REGION: ICD-10-CM

## 2021-07-23 DIAGNOSIS — N94.9 UNSPECIFIED CONDITION ASSOCIATED WITH FEMALE GENITAL ORGANS AND MENSTRUAL CYCLE: ICD-10-CM

## 2021-07-23 DIAGNOSIS — N95.2 POSTMENOPAUSAL ATROPHIC VAGINITIS: ICD-10-CM

## 2021-07-23 DIAGNOSIS — M25.50 PAIN IN UNSPECIFIED JOINT: ICD-10-CM

## 2021-07-23 PROCEDURE — 99215 OFFICE O/P EST HI 40 MIN: CPT

## 2021-07-23 RX ORDER — TIZANIDINE 4 MG/1
4 TABLET ORAL
Qty: 60 | Refills: 5 | Status: DISCONTINUED | COMMUNITY
Start: 2019-07-24 | End: 2021-07-23

## 2021-07-23 RX ORDER — SIMVASTATIN 10 MG/1
10 TABLET, FILM COATED ORAL
Qty: 90 | Refills: 0 | Status: DISCONTINUED | COMMUNITY
Start: 2021-04-05 | End: 2021-07-23

## 2021-07-23 RX ORDER — CHLORZOXAZONE 500 MG/1
500 TABLET ORAL EVERY 8 HOURS
Qty: 90 | Refills: 0 | Status: DISCONTINUED | COMMUNITY
Start: 2020-12-30 | End: 2021-07-23

## 2021-07-23 RX ORDER — PANTOPRAZOLE 40 MG/1
40 TABLET, DELAYED RELEASE ORAL DAILY
Qty: 30 | Refills: 5 | Status: DISCONTINUED | COMMUNITY
Start: 2020-10-09 | End: 2021-07-23

## 2021-07-23 RX ORDER — METHYLPREDNISOLONE 4 MG/1
4 TABLET ORAL
Qty: 1 | Refills: 0 | Status: DISCONTINUED | COMMUNITY
Start: 2020-12-30 | End: 2021-07-23

## 2021-07-23 RX ORDER — PENTOSAN POLYSULFATE SODIUM 100 MG/1
100 CAPSULE, GELATIN COATED ORAL 3 TIMES DAILY
Qty: 90 | Refills: 5 | Status: DISCONTINUED | COMMUNITY
Start: 2020-02-28 | End: 2021-07-23

## 2021-07-23 RX ORDER — FAMOTIDINE 20 MG/1
20 TABLET, FILM COATED ORAL
Qty: 6 | Refills: 0 | Status: DISCONTINUED | COMMUNITY
Start: 2020-12-30 | End: 2021-07-23

## 2021-07-23 NOTE — PHYSICAL EXAM
[Chaperone Present] : A chaperone was present in the examining room during all aspects of the physical examination [FreeTextEntry1] : GH:  4.5   pB:  4 TVL: 8.5  C: -6 D:  -7 Aa: -2 Ba: -2  Ap: 0 Bp: 0\par  \par negative cough stress test\par negative atrophy\par positive urethral hypermobility\par bilateral levator ani spasm, positive tenderness\par positive urethral tenderness\par mild bladder tenderness\par mild cervical tenderness\par 2/5 Kegel\par

## 2021-07-23 NOTE — COUNSELING
[FreeTextEntry1] : \par Please start taking the elmiron three times per day for the pain.There are 6 months of refills at the pharmacy\par \par Please start the trospium twice a day to help lower the pressure in the bladder and protect the kidneys. There are 6 months of refills at the pharmacy\par \par Please call my office if you have any issues with the cost or side effects of the medication.\par \par Schedule 6 month follow up with my PATania (IC/elevated detrusor pressure)

## 2021-07-23 NOTE — HISTORY OF PRESENT ILLNESS
[FreeTextEntry1] : \par The patient is here for follow up for her pain\par History of long standing pelvic pain\par \par s/p Flexeril 5 mg (no improvement)\par s/p Flexeril 10 mg (no improvement)\par s/p Baclofen 20 mg (continued to have pain)\par Gabapentin 100 mg increased to 200 mg at bedtime for frequency (no improvement)\par Zanaflex 2 mg BID (no improvement)\par s/p Zanaflex 4mg: no benefit\par s/p Gabapentin 100mg TID: minimal benefit \par s/p bladder instillation x3 for bladder pain: no benefit\par 3/31/2021 ER visit for pain.\par 3/31/21: CT scan of abdomen/pelvis: bladder wall thickening with fat stranding, cystitis\par \par Her pain improves and is manageble when she is on Elmiron 100mg TID. We have prescribed it electronically in April, May, June, July 2021 but the patient reports that she was told there are no refills at the pharmacy.\par She says that when she takes the elmiron three times a day she feels better.\par Reports having the vaginal bulge and still believes that her pelvic pain is secondary to her prolapse\par Most bothered by the pain\par \par 7/21/21: urodynamics: sensitive bladder, no USUI, +obstructive voiding\par Plan: consider further management for her prolapse and if not consider other methods of decreasing the detrusor pressure\par

## 2021-07-23 NOTE — DISCUSSION/SUMMARY
[FreeTextEntry1] : \par Interstitial cystitis-\par Discussed extensively again that her prolapse is not the cause of her pelvic pain and surgically correcting the rectocele (since she declined pessary management) can worsen her pain. Her pain improves with taking the elmiron three times a day and therefore was advised to restart the medication again. The patient and her sister voiced understanding and they are going to go over to the pharmacy now to  the elmiron.\par \par Detrusor Dysfunction-\par We discussed the urodynamic findings that show obstructive voiding (high detrusor pressures with low flow). We discussed the cause of this in women including a previous incontinence procedure (which the patient has not had) and/or prolapse (patient has prolapse). We discussed that without lowering the detrusor pressure she is at risk for developing kidney damage. We are unable to determine if and when this will occur. Discussed that the ways to lower this pressure include anticholinergics or prolapse reduction (with pessary or surgery). The patient voiced understanding and elects for medical management. The risks and benefits of trospium were reviewed. As long as she does not have side effects from the anticholinergic, I would recommend staying on it long term to help lower the detrusor pressure.\par \par

## 2021-07-24 ENCOUNTER — LABORATORY RESULT (OUTPATIENT)
Age: 66
End: 2021-07-24

## 2021-10-25 ENCOUNTER — APPOINTMENT (OUTPATIENT)
Dept: NEUROLOGY | Facility: CLINIC | Age: 66
End: 2021-10-25

## 2021-11-03 ENCOUNTER — OUTPATIENT (OUTPATIENT)
Dept: OUTPATIENT SERVICES | Facility: HOSPITAL | Age: 66
LOS: 1 days | Discharge: HOME | End: 2021-11-03

## 2021-11-03 ENCOUNTER — APPOINTMENT (OUTPATIENT)
Dept: INTERNAL MEDICINE | Facility: CLINIC | Age: 66
End: 2021-11-03
Payer: MEDICAID

## 2021-11-03 ENCOUNTER — NON-APPOINTMENT (OUTPATIENT)
Age: 66
End: 2021-11-03

## 2021-11-03 VITALS
DIASTOLIC BLOOD PRESSURE: 73 MMHG | HEIGHT: 64 IN | WEIGHT: 196 LBS | BODY MASS INDEX: 33.46 KG/M2 | OXYGEN SATURATION: 98 % | SYSTOLIC BLOOD PRESSURE: 126 MMHG | HEART RATE: 69 BPM

## 2021-11-03 DIAGNOSIS — R73.9 HYPERGLYCEMIA, UNSPECIFIED: ICD-10-CM

## 2021-11-03 PROCEDURE — 99213 OFFICE O/P EST LOW 20 MIN: CPT | Mod: GC

## 2021-11-03 RX ORDER — NAPROXEN 500 MG/1
500 TABLET ORAL
Qty: 60 | Refills: 0 | Status: DISCONTINUED | COMMUNITY
Start: 2020-12-30 | End: 2021-11-03

## 2021-11-03 NOTE — ASSESSMENT
[FreeTextEntry1] : 66 years old female with PMHx of interstitial cytsitis, spinal stenosis. \par Presenting for follow up\par \par # interstitial cystis\par - follows with Uro-gyn \par - on Elmiron 100mg TID - will refill \par - will start amytripillin \par - f/u in urogyn\par \par # Detrusor dysfunction \par # vaginal prolapse\par - intructed on how to perform [place] \par - follows with uro/gyn \par - on Trospium Chloride 20 MG Oral Tablet, will refill \par - f/u in urogyn\par \par #Severe back pain 2/2 severe spinal stenosis \par MRI showing multilevel lumbar spinal stenosis and disk protusion. \par - neurosx, patient is still trying to make an appointment. \par  \par #Dyslipidemia\par ACSVC score 9.5%\par - will start on statin \par \par #Anemia- microcytic, hb stable\par Thalesmia workup is non remarkable\par Iron studies wnl\par \par # preDM, hba1c:6.4%\par - diet and lifestyle modification encouraged. \par \par #health care maintenance\par mammogram 2020 Birads 1\par GI following, last colonscopy in 2016\par follow up in 3 months with repeat labs and prn. \par

## 2021-11-03 NOTE — HISTORY OF PRESENT ILLNESS
[FreeTextEntry1] : follow up [de-identified] : 66 years old female with PMHx of interstitial cytsitis, spinal stenosis. \par Presenting for follow up. \par Interval hx: patient saw uro/gyn for interstitial cystitis and  Detrusor dysfunction. She was started on Elmiron and anticholinergic with no improvement. She has been taking these meds and still feels like she is "having a baby". No discomfort when she urinates. Symptoms not exaccerbated by Valsalva manuver. \par

## 2021-11-03 NOTE — PHYSICAL EXAM
[No Acute Distress] : no acute distress [No JVD] : no jugular venous distention [No Respiratory Distress] : no respiratory distress  [No Accessory Muscle Use] : no accessory muscle use [No Carotid Bruits] : no carotid bruits [No Abdominal Bruit] : a ~M bruit was not heard ~T in the abdomen [No Edema] : there was no peripheral edema [Soft] : abdomen soft [No Rash] : no rash [Normal] : no rash [FreeTextEntry1] : vaginal prolapse noted

## 2021-11-03 NOTE — REVIEW OF SYSTEMS
[Abdominal Pain] : abdominal pain [Back Pain] : back pain [Fever] : no fever [Chills] : no chills [Chest Pain] : no chest pain [Orthopnea] : no orthopnea [Shortness Of Breath] : no shortness of breath [Wheezing] : no wheezing [Nausea] : no nausea [Heartburn] : no heartburn [Melena] : no melena [Dysuria] : no dysuria [Hematuria] : no hematuria [Joint Pain] : no joint pain [Itching] : no itching [Skin Rash] : no skin rash [Headache] : no headache

## 2021-11-09 DIAGNOSIS — M54.50 LOW BACK PAIN, UNSPECIFIED: ICD-10-CM

## 2021-11-09 DIAGNOSIS — Z00.00 ENCOUNTER FOR GENERAL ADULT MEDICAL EXAMINATION WITHOUT ABNORMAL FINDINGS: ICD-10-CM

## 2021-11-09 DIAGNOSIS — N31.8 OTHER NEUROMUSCULAR DYSFUNCTION OF BLADDER: ICD-10-CM

## 2021-11-09 DIAGNOSIS — R73.9 HYPERGLYCEMIA, UNSPECIFIED: ICD-10-CM

## 2021-11-09 DIAGNOSIS — N30.10 INTERSTITIAL CYSTITIS (CHRONIC) WITHOUT HEMATURIA: ICD-10-CM

## 2021-11-09 DIAGNOSIS — M54.16 RADICULOPATHY, LUMBAR REGION: ICD-10-CM

## 2021-12-01 ENCOUNTER — APPOINTMENT (OUTPATIENT)
Dept: UROGYNECOLOGY | Facility: CLINIC | Age: 66
End: 2021-12-01
Payer: MEDICAID

## 2021-12-01 ENCOUNTER — OUTPATIENT (OUTPATIENT)
Dept: OUTPATIENT SERVICES | Facility: HOSPITAL | Age: 66
LOS: 1 days | Discharge: HOME | End: 2021-12-01

## 2021-12-01 VITALS — WEIGHT: 196 LBS | DIASTOLIC BLOOD PRESSURE: 78 MMHG | SYSTOLIC BLOOD PRESSURE: 120 MMHG | BODY MASS INDEX: 33.64 KG/M2

## 2021-12-01 DIAGNOSIS — Z87.39 PERSONAL HISTORY OF OTHER DISEASES OF THE MUSCULOSKELETAL SYSTEM AND CONNECTIVE TISSUE: ICD-10-CM

## 2021-12-01 DIAGNOSIS — Z87.898 PERSONAL HISTORY OF OTHER SPECIFIED CONDITIONS: ICD-10-CM

## 2021-12-01 DIAGNOSIS — M48.07 SPINAL STENOSIS, LUMBOSACRAL REGION: ICD-10-CM

## 2021-12-01 PROCEDURE — 99215 OFFICE O/P EST HI 40 MIN: CPT

## 2021-12-15 LAB
25(OH)D3 SERPL-MCNC: 19 NG/ML
ALBUMIN SERPL ELPH-MCNC: 4.6 G/DL
ALP BLD-CCNC: 154 U/L
ALT SERPL-CCNC: 27 U/L
ANION GAP SERPL CALC-SCNC: 14 MMOL/L
AST SERPL-CCNC: 37 U/L
BASOPHILS # BLD AUTO: 0.02 K/UL
BASOPHILS NFR BLD AUTO: 0.5 %
BILIRUB SERPL-MCNC: 0.3 MG/DL
BUN SERPL-MCNC: 11 MG/DL
CALCIUM SERPL-MCNC: 9.6 MG/DL
CHLORIDE SERPL-SCNC: 103 MMOL/L
CHOLEST SERPL-MCNC: 168 MG/DL
CO2 SERPL-SCNC: 24 MMOL/L
CREAT SERPL-MCNC: 0.8 MG/DL
EOSINOPHIL # BLD AUTO: 0.26 K/UL
EOSINOPHIL NFR BLD AUTO: 7 %
ESTIMATED AVERAGE GLUCOSE: 131 MG/DL
GLUCOSE SERPL-MCNC: 89 MG/DL
HBA1C MFR BLD HPLC: 6.2 %
HCT VFR BLD CALC: 36.8 %
HDLC SERPL-MCNC: 48 MG/DL
HGB BLD-MCNC: 10.9 G/DL
IMM GRANULOCYTES NFR BLD AUTO: 0.3 %
LDLC SERPL CALC-MCNC: 104 MG/DL
LYMPHOCYTES # BLD AUTO: 1.45 K/UL
LYMPHOCYTES NFR BLD AUTO: 39.2 %
MAN DIFF?: NORMAL
MCHC RBC-ENTMCNC: 21.3 PG
MCHC RBC-ENTMCNC: 29.6 G/DL
MCV RBC AUTO: 72 FL
MONOCYTES # BLD AUTO: 0.49 K/UL
MONOCYTES NFR BLD AUTO: 13.2 %
NEUTROPHILS # BLD AUTO: 1.47 K/UL
NEUTROPHILS NFR BLD AUTO: 39.8 %
NONHDLC SERPL-MCNC: 120 MG/DL
PLATELET # BLD AUTO: 281 K/UL
POTASSIUM SERPL-SCNC: 4.8 MMOL/L
PROT SERPL-MCNC: 7.3 G/DL
RBC # BLD: 5.11 M/UL
RBC # FLD: 17.2 %
SODIUM SERPL-SCNC: 141 MMOL/L
TRIGL SERPL-MCNC: 64 MG/DL
TSH SERPL-ACNC: 1.6 UIU/ML
WBC # FLD AUTO: 3.7 K/UL

## 2021-12-16 ENCOUNTER — NON-APPOINTMENT (OUTPATIENT)
Age: 66
End: 2021-12-16

## 2021-12-26 ENCOUNTER — NON-APPOINTMENT (OUTPATIENT)
Age: 66
End: 2021-12-26

## 2022-01-01 PROBLEM — Z87.898 HISTORY OF FATIGUE: Status: RESOLVED | Noted: 2017-06-02 | Resolved: 2021-11-03

## 2022-01-01 PROBLEM — Z87.39 HISTORY OF LOW BACK PAIN: Status: RESOLVED | Noted: 2017-01-18 | Resolved: 2022-01-01

## 2022-01-01 PROBLEM — M48.07 LUMBOSACRAL STENOSIS: Status: RESOLVED | Noted: 2020-11-04 | Resolved: 2022-01-01

## 2022-01-01 NOTE — COUNSELING
[FreeTextEntry1] : \par The hospital will contact you to arrange your preoperative testing and preoperative medical evaluation.\par \par Call with any issues\par \par I will see you on Jan 4th\par \par Happy Holidays! Modified Advancement Flap Text: The defect edges were debeveled with a #15 scalpel blade.  Given the location of the defect, shape of the defect and the proximity to free margins a modified advancement flap was deemed most appropriate.  Using a sterile surgical marker, an appropriate advancement flap was drawn incorporating the defect and placing the expected incisions within the relaxed skin tension lines where possible.    The area thus outlined was incised deep to adipose tissue with a #15 scalpel blade.  The skin margins were undermined to an appropriate distance in all directions utilizing iris scissors.

## 2022-01-01 NOTE — HISTORY OF PRESENT ILLNESS
[FreeTextEntry1] : \par The patient is here for surgical counseling for her rectocele\par Patient reports that she feels like her prolapse has worsened and is very bothersome\par \par Patient was last seen by me on 7/23/21 for surgical counseling where she reported she was only bothered by her pain so she declined surgical management for her prolapse at that time\par \par 7/21/21: urodynamics: sensitive bladder, no USUI, +obstructive voiding\par Since the patient was not bothered by her prolapse at that time and she did not want to use a pessary for prolapse reduction (to treat the obstructive voiding), she was then started on trospium 20mg twice a day to lower the detrusor pressure\par 11/3/21 PCP refilled the trospium 20mg twice a day\par \par Patient previously reported that elmiron 100mg three times a day helped for her pelvic and bladder pain when she takes it. She reports that the pharmacy did not have her refills. She was additionally started on elavil 10mg daily by her PCP on 11/3/2021\par

## 2022-01-01 NOTE — DISCUSSION/SUMMARY
[FreeTextEntry1] : \par Rectocele-\par The surgical procedure of exam under anesthesia/posterior colporrhaphy/cystoscopy was reviewed. The patient was advised that the surgery does not improve urge incontinence and can worsen those symptoms. The postoperative restrictions were reviewed. All of the patient's questions and concerns were answered.\par \par The interpretation of the urodynamics was reviewed.  The patient was also counseled that although the urodynamics showed that she is not likely to develop stress incontinence after her prolapse surgery, there is still a risk that it can develop postoperatively.\par \par The patient was counseled that prolapse does not cause vaginal and pelvic pain and the pain can worsen after surgery. \par \par The risks and benefits and alternatives of the above procedures were reviewed and informed consent was signed. The patient will be scheduled for surgery, preop lab testing and preop medical eval.\par \par

## 2022-01-04 ENCOUNTER — OUTPATIENT (OUTPATIENT)
Dept: OUTPATIENT SERVICES | Facility: HOSPITAL | Age: 67
LOS: 1 days | Discharge: HOME | End: 2022-01-04
Payer: MEDICAID

## 2022-01-04 VITALS
TEMPERATURE: 98 F | HEART RATE: 91 BPM | OXYGEN SATURATION: 97 % | SYSTOLIC BLOOD PRESSURE: 184 MMHG | RESPIRATION RATE: 16 BRPM | DIASTOLIC BLOOD PRESSURE: 99 MMHG | WEIGHT: 149.91 LBS | HEIGHT: 63 IN

## 2022-01-04 VITALS
OXYGEN SATURATION: 96 % | SYSTOLIC BLOOD PRESSURE: 177 MMHG | HEART RATE: 78 BPM | RESPIRATION RATE: 18 BRPM | DIASTOLIC BLOOD PRESSURE: 83 MMHG

## 2022-01-04 DIAGNOSIS — Z98.890 OTHER SPECIFIED POSTPROCEDURAL STATES: Chronic | ICD-10-CM

## 2022-01-04 LAB
ABO RH CONFIRMATION: SIGNIFICANT CHANGE UP
BLD GP AB SCN SERPL QL: SIGNIFICANT CHANGE UP

## 2022-01-04 PROCEDURE — 57250 REPAIR RECTUM & VAGINA: CPT

## 2022-01-04 RX ORDER — ATORVASTATIN CALCIUM 80 MG/1
1 TABLET, FILM COATED ORAL
Qty: 0 | Refills: 0 | DISCHARGE

## 2022-01-04 RX ORDER — MORPHINE SULFATE 50 MG/1
2 CAPSULE, EXTENDED RELEASE ORAL
Refills: 0 | Status: DISCONTINUED | OUTPATIENT
Start: 2022-01-04 | End: 2022-01-05

## 2022-01-04 RX ORDER — TROSPIUM CHLORIDE 20 MG/1
1 TABLET, FILM COATED ORAL
Qty: 0 | Refills: 0 | DISCHARGE

## 2022-01-04 RX ORDER — IBUPROFEN 200 MG
1 TABLET ORAL
Qty: 60 | Refills: 0
Start: 2022-01-04

## 2022-01-04 RX ORDER — OXYCODONE AND ACETAMINOPHEN 5; 325 MG/1; MG/1
1 TABLET ORAL ONCE
Refills: 0 | Status: DISCONTINUED | OUTPATIENT
Start: 2022-01-04 | End: 2022-01-04

## 2022-01-04 RX ORDER — SODIUM CHLORIDE 9 MG/ML
1000 INJECTION, SOLUTION INTRAVENOUS
Refills: 0 | Status: DISCONTINUED | OUTPATIENT
Start: 2022-01-04 | End: 2022-01-05

## 2022-01-04 RX ORDER — ONDANSETRON 8 MG/1
4 TABLET, FILM COATED ORAL ONCE
Refills: 0 | Status: DISCONTINUED | OUTPATIENT
Start: 2022-01-04 | End: 2022-01-05

## 2022-01-04 RX ORDER — AMITRIPTYLINE HCL 25 MG
1 TABLET ORAL
Qty: 0 | Refills: 0 | DISCHARGE

## 2022-01-04 RX ORDER — DOCUSATE SODIUM 100 MG
1 CAPSULE ORAL
Qty: 60 | Refills: 1
Start: 2022-01-04 | End: 2022-03-04

## 2022-01-04 RX ORDER — MORPHINE SULFATE 50 MG/1
4 CAPSULE, EXTENDED RELEASE ORAL
Refills: 0 | Status: DISCONTINUED | OUTPATIENT
Start: 2022-01-04 | End: 2022-01-05

## 2022-01-04 RX ADMIN — OXYCODONE AND ACETAMINOPHEN 1 TABLET(S): 5; 325 TABLET ORAL at 18:55

## 2022-01-04 RX ADMIN — OXYCODONE AND ACETAMINOPHEN 1 TABLET(S): 5; 325 TABLET ORAL at 21:00

## 2022-01-04 RX ADMIN — SODIUM CHLORIDE 100 MILLILITER(S): 9 INJECTION, SOLUTION INTRAVENOUS at 18:30

## 2022-01-04 RX ADMIN — OXYCODONE AND ACETAMINOPHEN 1 TABLET(S): 5; 325 TABLET ORAL at 21:03

## 2022-01-04 NOTE — H&P ADULT - NSHPPHYSICALEXAM_GEN_ALL_CORE
Ap: 0 Bp: 0     No urethral tenderness  Positive cervical tenderness  Atrophy noted   Negative cough stress test  Bilateral levator ani spasms and tenderness.

## 2022-01-04 NOTE — H&P ADULT - NSHPADDITIONALINFOADULT_GEN_ALL_CORE
Rectocele-  The surgical procedure of exam under anesthesia/posterior colporrhaphy/cystoscopy was reviewed. The patient was advised that the surgery does not improve urge incontinence and can worsen those symptoms. The postoperative restrictions were reviewed. All of the patient's questions and concerns were answered.    The interpretation of the urodynamics was reviewed. The patient was also counseled that although the urodynamics showed that she is not likely to develop stress incontinence after her prolapse surgery, there is still a risk that it can develop postoperatively.    The patient was counseled that prolapse does not cause vaginal and pelvic pain and the pain can worsen after surgery.     The risks and benefits and alternatives of the above procedures were reviewed and informed consent was signed. The patient will be scheduled for surgery, preop lab testing and preop medical eval.

## 2022-01-04 NOTE — ASU DISCHARGE PLAN (ADULT/PEDIATRIC) - CARE PROVIDER_API CALL
Risa Lovelace)  Female [facility]; Obstetrics and Gynecology  [address] [place] [address]  Phone: [phone]  Fax: [phone]  Established Patient  Follow Up Time: 2 weeks

## 2022-01-04 NOTE — ASU DISCHARGE PLAN (ADULT/PEDIATRIC) - ASU DC SPECIAL INSTRUCTIONSFT
DIET  - You may resume your normal diet. Eat a well-balanced diet. You may prefer to eat light meals for the first few days after surgery.  Drink plenty of water (6-8 glasses a day).    - Please take Colace (stool softener) BID until you have normal bowel movements.  If you have constipation or don't have a bowel movement 2-3 days after surgery, please call the office to get a stronger stool softener.     ACTIVITY:   - No heavy lifting/pushing/pulling for 6 weeks. Do not lift anything more than 10 lbs (such as laundry, groceries, children, pets), vacuum, push heavy doors or grocery carts, etc, for 6 weeks.  - You may climb stairs as tolerated.  -  Do not put anything in the vagina for at least 6 weeks after surgery unless otherwise instructed by your doctor (including tampons, douching, sexual intercourse, etc).  -  No driving for 1 week after surgery and not while taking narcotic pain medication. Drive defensively when you are ready.  -  Avoid sitting or lying in bed for more than 2 hours at a time while you are awake to reduce your risk of blood clots.    PAIN MANAGEMENT:   - Motrin/Ibuprofen - 600 mg every 6 hours as needed (try to take with food). The maximum dose of Motrin/ibuprofen is 2400mg in 24 hours  - Percocet (Oxycodone 5mg - Acetaminophen 325mg) every 6 hours as needed for severe pain (limit use     WHAT TO EXPECT AT HOME  - Recovery from surgery is generally 2-4 weeks, but sometimes longer for more strenuous activity. It is normal to be very tired during this time.  - It is normal to have some drainage or a small amount of vaginal bleeding after surgery that would require the use of a light pantiliner. This discharge may last up to 6 weeks. The bleeding and discharge should be light and should have no odor.    WHEN TO CALL YOUR DOCTOR:  - Fever (>100.4°F or 38.0°C) or chills  - Incision problems such as redness, warmth, swelling, or foul smelling drainage.  - Severe nausea or persistent vomiting.  - Bright red vaginal bleeding (soaking >1 pad/hour) or foul smelling vaginal drainage.  - Severe pain not relieved with pain medication.  - Pain with urination, cloudy urine, or foul smelling urine.  - Or if you have any other problems or questions.

## 2022-01-04 NOTE — CHART NOTE - NSCHARTNOTEFT_GEN_A_CORE
PACU ANESTHESIA ADMISSION NOTE      Procedure: Repair, rectocele, with perineorrhaphy      Post op diagnosis:  Rectocele        ____  Intubated  TV:______       Rate: ______      FiO2: ______    _x___  Patent Airway    _x___  Full return of protective reflexes    _x___  Full recovery from anesthesia / back to baseline status    Vitals:  T(F): 97   HR: 75  BP: 140/69  RR: 20  SpO2: 100%    Mental Status:  _x___ Awake   __x___ Alert   _____ Drowsy   _____ Sedated    Nausea/Vomiting:  _x___  NO       ______Yes,   See Post - Op Orders         Pain Scale (0-10):  __0___    Treatment: _x___ None    ____ See Post - Op/PCA Orders    Post - Operative Fluids:   __x__ Oral   ____ See Post - Op Orders    Plan: Discharge:   _x___Home       _____Floor     _____Critical Care    _____  Other:_________________    Comments:  No anesthesia issues or complications noted.  Discharge when criteria met.

## 2022-01-04 NOTE — H&P ADULT - HISTORY OF PRESENT ILLNESS
The patient is here for surgical counseling for her rectocele  Patient reports that she feels like her prolapse has worsened and is very bothersome    Patient was last seen by me on 7/23/21 for surgical counseling where she reported she was only bothered by her pain so she declined surgical management for her prolapse at that time    7/21/21: urodynamics: sensitive bladder, no USUI, +obstructive voiding  Since the patient was not bothered by her prolapse at that time and she did not want to use a pessary for prolapse reduction (to treat the obstructive voiding), she was then started on trospium 20mg twice a day to lower the detrusor pressure  11/3/21 PCP refilled the trospium 20mg twice a day    Patient previously reported that elmiron 100mg three times a day helped for her pelvic and bladder pain when she takes it. She reports that the pharmacy did not have her refills. She was additionally started on elavil 10mg daily by her PCP on 11/3/2021

## 2022-01-04 NOTE — ASU DISCHARGE PLAN (ADULT/PEDIATRIC) - NS MD DC FALL RISK RISK
For information on Fall & Injury Prevention, visit: [email or web address] OR  [email or web address] OR  https://www.cdc.gov/steadi/patient.html

## 2022-01-05 ENCOUNTER — NON-APPOINTMENT (OUTPATIENT)
Age: 67
End: 2022-01-05

## 2022-01-05 ENCOUNTER — APPOINTMENT (OUTPATIENT)
Dept: INTERNAL MEDICINE | Facility: CLINIC | Age: 67
End: 2022-01-05

## 2022-01-06 ENCOUNTER — NON-APPOINTMENT (OUTPATIENT)
Age: 67
End: 2022-01-06

## 2022-01-06 PROBLEM — N81.6 RECTOCELE: Chronic | Status: ACTIVE | Noted: 2022-01-04

## 2022-01-06 PROBLEM — E78.5 HYPERLIPIDEMIA, UNSPECIFIED: Chronic | Status: ACTIVE | Noted: 2022-01-04

## 2022-01-11 ENCOUNTER — APPOINTMENT (OUTPATIENT)
Dept: UROGYNECOLOGY | Facility: CLINIC | Age: 67
End: 2022-01-11
Payer: MEDICAID

## 2022-01-11 ENCOUNTER — OUTPATIENT (OUTPATIENT)
Dept: OUTPATIENT SERVICES | Facility: HOSPITAL | Age: 67
LOS: 1 days | Discharge: HOME | End: 2022-01-11

## 2022-01-11 VITALS
HEIGHT: 64 IN | SYSTOLIC BLOOD PRESSURE: 136 MMHG | DIASTOLIC BLOOD PRESSURE: 64 MMHG | WEIGHT: 201 LBS | BODY MASS INDEX: 34.31 KG/M2

## 2022-01-11 DIAGNOSIS — Z98.890 OTHER SPECIFIED POSTPROCEDURAL STATES: Chronic | ICD-10-CM

## 2022-01-11 PROCEDURE — 99024 POSTOP FOLLOW-UP VISIT: CPT

## 2022-01-11 PROCEDURE — 51700 IRRIGATION OF BLADDER: CPT | Mod: 58

## 2022-01-11 NOTE — DISCUSSION/SUMMARY
[FreeTextEntry1] : Rectocele\par Pt passed voiding trial.\par Advised to cont stool softeners\par Reviewed postoperative restrictions. All of the patient's questions and concerns were answered. Advised to return for her next scheduled postop visit or earlier if she has any issues. The patient voiced understanding and is happy with the plan.\par

## 2022-01-11 NOTE — PHYSICAL EXAM
[Chaperone Present] : A chaperone was present in the examining room during all aspects of the physical examination [FreeTextEntry1] : Patient presents to the office for a voiding trial. Patient identified. Procedure explained to the patient. Patient has bag which is draining 100 cc clear, yellow urine. Patient placed in lithotomy position.  Gonzalez disconnected from bag. Instilled 180 cc sterile water into bladder until patient felt a strong urge to void. Using a 10 cc syringe, catheter balloon deflated and Gonzalez catheter removed without difficulty. Assisted patient to the commode where she voided  200 cc without difficulty.\par \par Patient is scheduled for her two week postop visit on . Patient instructed to call our office if she should experience a fever greater than 100.4, difficulty or inability to void that lasts greater than four to six hours, urinary frequency/urgency, urinary incontinence, or if she has cloudy, foul smelling urine or dysuria. Patient instructed to call our office with any other questions, concerns or problems. Assisted patient to waiting room.\par  [No Acute Distress] : in no acute distress [Well developed] : well developed [Well Nourished] : ~L well nourished

## 2022-01-11 NOTE — COUNSELING
[FreeTextEntry1] : Please continue with all of your postoperative restrictions\par \par Please continue with the stool softeners\par \par Call with any issues\par \par Return for your next scheduled postop visit on 1/21/22\par

## 2022-01-11 NOTE — HISTORY OF PRESENT ILLNESS
[FreeTextEntry1] : Patient is here for an active voiding trial\par S/p posterior colporrhaphy/perrineorhaphy 1/4/2022. Discharged home on 1/4/2022 with barajas after failing her voiding trial.\par \par Patient is doing well and has no complaints. Takes Motrin and Percocet for pain as needed, last bowel movement 2 days ago. \par

## 2022-01-13 DIAGNOSIS — E78.00 PURE HYPERCHOLESTEROLEMIA, UNSPECIFIED: ICD-10-CM

## 2022-01-13 DIAGNOSIS — N81.6 RECTOCELE: ICD-10-CM

## 2022-01-21 ENCOUNTER — APPOINTMENT (OUTPATIENT)
Dept: UROGYNECOLOGY | Facility: CLINIC | Age: 67
End: 2022-01-21
Payer: MEDICAID

## 2022-01-21 ENCOUNTER — OUTPATIENT (OUTPATIENT)
Dept: OUTPATIENT SERVICES | Facility: HOSPITAL | Age: 67
LOS: 1 days | Discharge: HOME | End: 2022-01-21

## 2022-01-21 VITALS
WEIGHT: 201 LBS | HEIGHT: 64 IN | BODY MASS INDEX: 34.31 KG/M2 | SYSTOLIC BLOOD PRESSURE: 132 MMHG | DIASTOLIC BLOOD PRESSURE: 72 MMHG

## 2022-01-21 DIAGNOSIS — Z98.890 OTHER SPECIFIED POSTPROCEDURAL STATES: Chronic | ICD-10-CM

## 2022-01-21 PROCEDURE — 99024 POSTOP FOLLOW-UP VISIT: CPT

## 2022-01-21 NOTE — ADDENDUM
[FreeTextEntry1] : Please continue with all of your postoperative restrictions\par \par Please continue with the stool softeners\par \par Please take Miralax (over the counter) or Milk of Magnesia to help with bowel movements\par \par Call with any issues\par \par Return for your next scheduled postop visit on 3/2/22.\par

## 2022-01-21 NOTE — DISCUSSION/SUMMARY
[FreeTextEntry1] : Rectocele\par Advised to add Miralax and MOM\par Reviewed postoperative restrictions. All of the patient's questions and concerns were answered. Advised to return for her next scheduled postop visit or earlier if she has any issues. The patient voiced understanding and is happy with the plan.\par

## 2022-01-28 NOTE — PHYSICAL EXAM
· Potasium 6 0 >>4 8>> 3 5 after dialysis, now 3 3  · See related plan above  · Received 20 meq oral K prior to dc, per nephrology  [Chaperone Present] : A chaperone was present in the examining room during all aspects of the physical examination [FreeTextEntry1] : \par Ap: 0 Bp: 0 \par \par No urethral tenderness\par Positive cervical tenderness\par Atrophy noted \par Negative cough stress test\par Bilateral levator ani spasms and tenderness

## 2022-03-02 ENCOUNTER — APPOINTMENT (OUTPATIENT)
Dept: UROGYNECOLOGY | Facility: CLINIC | Age: 67
End: 2022-03-02
Payer: MEDICAID

## 2022-03-02 ENCOUNTER — OUTPATIENT (OUTPATIENT)
Dept: OUTPATIENT SERVICES | Facility: HOSPITAL | Age: 67
LOS: 1 days | Discharge: HOME | End: 2022-03-02

## 2022-03-02 VITALS
WEIGHT: 206.31 LBS | DIASTOLIC BLOOD PRESSURE: 72 MMHG | BODY MASS INDEX: 35.22 KG/M2 | SYSTOLIC BLOOD PRESSURE: 124 MMHG | HEIGHT: 64 IN

## 2022-03-02 DIAGNOSIS — Z98.890 OTHER SPECIFIED POSTPROCEDURAL STATES: Chronic | ICD-10-CM

## 2022-03-02 PROCEDURE — 99024 POSTOP FOLLOW-UP VISIT: CPT

## 2022-03-02 PROCEDURE — 51701 INSERT BLADDER CATHETER: CPT | Mod: 58

## 2022-03-02 RX ORDER — TROSPIUM CHLORIDE 20 MG/1
20 TABLET, FILM COATED ORAL
Qty: 60 | Refills: 5 | Status: DISCONTINUED | COMMUNITY
Start: 2021-07-23 | End: 2022-03-02

## 2022-03-02 NOTE — ADDENDUM
[FreeTextEntry1] : \par You look great!\par \par Please keep taking the miralax every day for the constipation control\par \par All restrictions are lifted at this time\par \par Please continue to take the elmiron three times per day. I sent in refills\par \par Please continue to take the amitriptyline at night. I sent in refills\par \par Please call my office if you have any issues with the cost or side effects of the medication.\par \par Schedule 3 month follow up with my PATania (IC)

## 2022-03-02 NOTE — OBJECTIVE
[Post Void Residual ____ ml] : Post Void Residual was [unfilled] ml [Clean, Dry, Intact] : Clean, Dry, Intact [Good Support] : Good support [Healing well] : healing well [No Masses or Tenderness] : no masses or tenderness

## 2022-03-02 NOTE — DISCUSSION/SUMMARY
[FreeTextEntry1] : \par History of rectocele-\par The patient is very happy with the postoperative results. All postoperative restrictions are lifted. Advised to continue with the miralax daily. Patient advised to follow up with us if she has any issues . The patient voiced understanding and agrees with the plan.\par \par

## 2022-05-18 ENCOUNTER — APPOINTMENT (OUTPATIENT)
Dept: NEUROLOGY | Facility: CLINIC | Age: 67
End: 2022-05-18
Payer: MEDICAID

## 2022-05-18 VITALS
SYSTOLIC BLOOD PRESSURE: 144 MMHG | OXYGEN SATURATION: 98 % | WEIGHT: 207 LBS | BODY MASS INDEX: 35.34 KG/M2 | HEIGHT: 64 IN | HEART RATE: 99 BPM | DIASTOLIC BLOOD PRESSURE: 82 MMHG

## 2022-05-18 PROCEDURE — 99203 OFFICE O/P NEW LOW 30 MIN: CPT

## 2022-05-18 NOTE — REVIEW OF SYSTEMS
[Difficulty Walking] : difficulty walking [Limping] : limping [Arthralgias] : arthralgias [Joint Pain] : joint pain [Limb Pain] : limb pain [Negative] : Heme/Lymph

## 2022-05-18 NOTE — ASSESSMENT
[FreeTextEntry1] : DANK BERNARD is a 67 year old woman with medical history of lumbar spine stenosis, unspecific abdominal pain. GERD, hyperlipidemia, join pain and obesity is here as a new patient to establish her care. She reports chronic history of low back pain, abdominal pain and gait ataxia. She used to follow with Dr Gresham and recommended to repeat MRI lumbar spine. I reviewed the last images which showed sever L3-4 and L2-L3 spinal stenosis explaining her low back pain, gait ataxia and neurogenic claudication. Unclear whether her abdominal pain is related to her radiculopathy. \par \par - Referral back to neurosurgery to reassess for surgery given the severity of spinal stenosis  \par - MRI Lumbar spine. \par - Start gabapentin 300 mg TID\par - Ref to pain management for possible LSI and PT \par - RTC in 3-4 months

## 2022-05-18 NOTE — HISTORY OF PRESENT ILLNESS
[FreeTextEntry1] : DANK BERNARD is a 67 year old woman with medical history of lumbar spine stenosis, unspecific abdominal pain. GERD, hyperlipidemia, join pain, obesity  is here to establish her care with neurologist. Patient has long history of lumbar spine stenosis and radiculopathy and used to follow up with neurosurgeon Dr Jones but lost her follow ups. Last MRI lumbar spine showed sever L2-L3 and L3-L4 spinal stenosis. She presents to the office with her sister. She speaks  language and her sister helps with translation. She is poor historian and initially was not sure why she was sent her to see a neurologist. After further questions notified that she has been suffering from constant low back pain and lower abdominal pain for many years.She visiting a family in [place] and went to ED for worsening abdominal pain and back pain  and was told to visit a neurologist. She reports constant pain the the lower back with radiation to the thigh muscles which gets worse after walking and standing.  Denies incontinence  or numbness in genital region. Denies paresthesia or numbness in her feet. She uses a cane for ambulation and has hard time getting up from sitting position. In addition reports pain in the left knee for many years.

## 2022-05-18 NOTE — PHYSICAL EXAM
[FreeTextEntry1] : Mental status: Awake, alert and oriented x3.  Recent and remote memory intact.  Naming, repetition and comprehension intact.  Attention/concentration intact.  No dysarthria, no aphasia.  Fund of knowledge appropriate.  \par Cranial nerves: Pupils equally round and reactive to light, visual fields full, no nystagmus, extraocular muscles intact, V1 through V3 intact bilaterally and symmetric, face symmetric, hearing intact to finger rub, palate elevation symmetric, tongue was midline.\par Motor:  Bilateral hip flexion: 4+/5 in the right and 4-/5 in the left. Rest of the leg muscles are 5/5.    strength 5/5.  \par Sensation: Intact to light touch\par Coordination: No dysmetria on finger-to-nose and heel-to-shin.  No dysdiadokinesia.\par Reflexes: 2+ in bilateral UE and absent in the right knee. Not able to assess the left knee. Absent reflexes in the ankles , downgoing toes bilaterally. (-) Lowry.\par Gait: Unsteady. Trendelenburg. Not able to walk unasserted. \par \par

## 2022-06-20 ENCOUNTER — EMERGENCY (EMERGENCY)
Facility: HOSPITAL | Age: 67
LOS: 0 days | Discharge: HOME | End: 2022-06-20
Attending: EMERGENCY MEDICINE | Admitting: EMERGENCY MEDICINE
Payer: MEDICAID

## 2022-06-20 VITALS
DIASTOLIC BLOOD PRESSURE: 92 MMHG | OXYGEN SATURATION: 99 % | HEART RATE: 74 BPM | SYSTOLIC BLOOD PRESSURE: 135 MMHG | TEMPERATURE: 97 F | HEIGHT: 63 IN | RESPIRATION RATE: 16 BRPM

## 2022-06-20 DIAGNOSIS — M25.562 PAIN IN LEFT KNEE: ICD-10-CM

## 2022-06-20 DIAGNOSIS — M54.9 DORSALGIA, UNSPECIFIED: ICD-10-CM

## 2022-06-20 DIAGNOSIS — E78.5 HYPERLIPIDEMIA, UNSPECIFIED: ICD-10-CM

## 2022-06-20 DIAGNOSIS — Z98.890 OTHER SPECIFIED POSTPROCEDURAL STATES: Chronic | ICD-10-CM

## 2022-06-20 PROCEDURE — 99283 EMERGENCY DEPT VISIT LOW MDM: CPT

## 2022-06-20 PROCEDURE — 73564 X-RAY EXAM KNEE 4 OR MORE: CPT | Mod: 26,LT

## 2022-06-20 RX ORDER — IBUPROFEN 200 MG
1 TABLET ORAL
Qty: 28 | Refills: 0
Start: 2022-06-20 | End: 2022-06-26

## 2022-06-20 RX ORDER — IBUPROFEN 200 MG
600 TABLET ORAL ONCE
Refills: 0 | Status: COMPLETED | OUTPATIENT
Start: 2022-06-20 | End: 2022-06-20

## 2022-06-20 RX ADMIN — Medication 600 MILLIGRAM(S): at 11:08

## 2022-06-20 NOTE — ED PROVIDER NOTE - PHYSICAL EXAMINATION
CONST: well appearing for age  HEAD:  normocephalic, atraumatic  EYES:  conjunctivae without injection, drainage or discharge  ENMT:  tympanic membranes pearly gray with normal landmarks; nasal mucosa moist; mouth moist without ulcerations or lesions; throat moist without erythema, exudate, ulcerations or lesions  NECK:  supple, no masses, no significant lymphadenopathy  CARDIAC:  regular rate and rhythm, normal S1 and S2, no murmurs, rubs or gallops  RESP:  respiratory rate and effort appear normal for age; lungs are clear to auscultation bilaterally; no rales or wheezes  ABDOMEN:  soft, nontender, nondistended  EXTREMITIES: + left mobile patella, no tenderness, no erythema, warmth or edemafull ROM, +2 DPs bilaterallyy  NEURO:  AAOx3, CN II-XII grossly intact, sensation intact throughout, normal movement, normal tone  SKIN:  normal skin color for age and race, well-perfused; warm and dry

## 2022-06-20 NOTE — ED PROVIDER NOTE - CLINICAL SUMMARY MEDICAL DECISION MAKING FREE TEXT BOX
67yoF with h/o back pains, no other significant MHx per pt and granddaughter, presents with multiple complaints. Her chief complaint is has been having L knee pain that radiates up to her entire back and to her entire body.  has had long h/o back pains and told she has degeneration. Has no MD,  follows at this ED for medical care. Denies fall/trauma, abd pain, v, urinary symptoms, numbness, fever, and all other symptoms. On exam, afebrile, hemodynamically stable, saturating well, NAD, well appearing, laying comfortably in bed, no WOB, speaking full sentences, head NCAT, EOMI grossly, anicteric, MMM, no JVD, RRR, nml S1/S2, no m/r/g, lungs CTAB, no w/r/r, abd soft, NT, ND, nml BS, no rebound or guarding, no CVAT, AAO, CN's 3-12 grossly intact, no leg cyanosis or edema, noted mobile L patella, no TTP/stepoff/deformity, full knee flex/extension, nml distal warmth/color/sensation, skin warm, well perfused, no rashes or hives. No trauma and low suspicion for fx and Xray unremarkable. No e/o cellulitis, septic arthritis, DVT, limb ischemia. Character and appearance low suspicion for dissection. No urinary symptoms and character/exam low suspicion for UTI/pyelo/stone. CC is knee pain. Patient is well appearing, NAD, afebrile, hemodynamically stable. Any available tests and studies were discussed with patient and family. ACE wrapped knee. Discharged with instructions in further symptomatic care, return precautions, and need for PMD f/u.

## 2022-06-20 NOTE — ED PROVIDER NOTE - PATIENT PORTAL LINK FT
You can access the FollowMyHealth Patient Portal offered by [facility] by registering at the following website: [email or web address] By joining [facility]’s FollowMyHealth portal, you will also be able to view your health information using other applications (apps) compatible with our system.

## 2022-06-20 NOTE — ED PROVIDER NOTE - NSFOLLOWUPINSTRUCTIONS_ED_ALL_ED_FT
Many things can cause knee pain. Sometimes, knee pain is sudden (acute) and may be caused by damage, swelling, or irritation of the muscles and tissues that support your knee.    The pain often goes away on its own with time and rest. If the pain does not go away, tests may be done to find out what is causing the pain.      Follow these instructions at home:      If you have a knee sleeve or brace:      •Wear the knee sleeve or brace as told by your doctor. Take it off only as told by your doctor.    •Loosen it if your toes:  •Tingle.      •Become numb.      •Turn cold and blue.        •Keep it clean.    •If the knee sleeve or brace is not waterproof:  •Do not let it get wet.      •Cover it with a watertight covering when you take a bath or shower.        Activity     •Rest your knee.      • Do not do things that cause pain or make pain worse.      •Avoid activities where both feet leave the ground at the same time (high-impact activities). Examples are running, jumping rope, and doing jumping jacks.      •Work with a physical therapist to make a safe exercise program, as told by your doctor.        Managing pain, stiffness, and swelling    •If told, put ice on the knee. To do this:  •If you have a removable knee sleeve or brace, take it off as told by your doctor.      •Put ice in a plastic bag.      •Place a towel between your skin and the bag.      •Leave the ice on for 20 minutes, 2–3 times a day.      •Take off the ice if your skin turns bright red. This is very important. If you cannot feel pain, heat, or cold, you have a greater risk of damage to the area.        •If told, use an elastic bandage to put pressure (compression) on your injured knee.      •Raise your knee above the level of your heart while you are sitting or lying down.      •Sleep with a pillow under your knee.      General instructions     •Take over-the-counter and prescription medicines only as told by your doctor.      • Do not smoke or use any products that contain nicotine or tobacco. If you need help quitting, ask your doctor.      •If you are overweight, work with your doctor and a food expert (dietitian) to set goals to lose weight. Being overweight can make your knee hurt more.      •Watch for any changes in your symptoms.      •Keep all follow-up visits.        Contact a doctor if:    •The knee pain does not stop.      •The knee pain changes or gets worse.      •You have a fever along with knee pain.      •Your knee is red or feels warm when you touch it.      •Your knee gives out or locks up.        Get help right away if:    •Your knee swells, and the swelling gets worse.      •You cannot move your knee.      •You have very bad knee pain that does not get better with pain medicine.        Summary    •Many things can cause knee pain. The pain often goes away on its own with time and rest.      •Your doctor may do tests to find out the cause of the pain.      •Watch for any changes in your symptoms. Relieve your pain with rest, medicines, light activity, and use of ice.      •Get help right away if you cannot move your knee or your knee pain is very bad.      This information is not intended to replace advice given to you by your health care provider. Make sure you discuss any questions you have with your health care provider.

## 2022-06-20 NOTE — ED PROVIDER NOTE - NSFOLLOWUPCLINICS_GEN_ALL_ED_FT
[facility]  Medicine  [address], [place]   Phone: [phone]  Fax:   Follow Up Time: 1-3 Days    [facility]  Dental  [address] [place] [address]  Phone: [phone]  Fax:   Follow Up Time: 1-3 Days

## 2022-06-20 NOTE — ED ADULT NURSE NOTE - NSIMPLEMENTINTERV_GEN_ALL_ED
Implemented All Universal Safety Interventions:  [place] to call system. Call bell, personal items and telephone within reach. Instruct patient to call for assistance. Room bathroom lighting operational. Non-slip footwear when patient is off stretcher. Physically safe environment: no spills, clutter or unnecessary equipment. Stretcher in lowest position, wheels locked, appropriate side rails in place.

## 2022-06-20 NOTE — ED PROVIDER NOTE - ATTENDING CONTRIBUTION TO CARE
67yoF with h/o back pains, no other significant MHx per pt and granddaughter, presents with multiple complaints. Her chief complaint is has been having L knee pain that radiates up to her entire back and to her entire body.  has had long h/o back pains and told she has degeneration. Has no MD,  follows at this ED for medical care. Denies fall/trauma, abd pain, v, urinary symptoms, numbness, fever, and all other symptoms. On exam, afebrile, hemodynamically stable, saturating well, NAD, well appearing, laying comfortably in bed, no WOB, speaking full sentences, head NCAT, EOMI grossly, anicteric, MMM, no JVD, RRR, nml S1/S2, no m/r/g, lungs CTAB, no w/r/r, abd soft, NT, ND, nml BS, no rebound or guarding, no CVAT, AAO, CN's 3-12 grossly intact, no leg cyanosis or edema, noted mobile L patella, no TTP/stepoff/deformity, full knee flex/extension, nml distal warmth/color/sensation, skin warm, well perfused, no rashes or hives. No trauma and low suspicion for fx. No e/o cellulitis, septic arthritis, DVT, limb ischemia. Character and appearance low suspicion for dissection. No urinary symptoms and character/exam low suspicion for UTI/pyelo/stone. CC is knee pain. 67yoF with h/o back pains, no other significant MHx per pt and granddaughter, presents with multiple complaints. Her chief complaint is has been having L knee pain that radiates up to her entire back and to her entire body.  has had long h/o back pains and told she has degeneration. Has no MD,  follows at this ED for medical care. Denies fall/trauma, abd pain, v, urinary symptoms, numbness, fever, and all other symptoms. On exam, afebrile, hemodynamically stable, saturating well, NAD, well appearing, laying comfortably in bed, no WOB, speaking full sentences, head NCAT, EOMI grossly, anicteric, MMM, no JVD, RRR, nml S1/S2, no m/r/g, lungs CTAB, no w/r/r, abd soft, NT, ND, nml BS, no rebound or guarding, no CVAT, AAO, CN's 3-12 grossly intact, no leg cyanosis or edema, noted mobile L patella, no TTP/stepoff/deformity, full knee flex/extension, nml distal warmth/color/sensation, skin warm, well perfused, no rashes or hives. No trauma and low suspicion for fx and Xray unremarkable. No e/o cellulitis, septic arthritis, DVT, limb ischemia. Character and appearance low suspicion for dissection. No urinary symptoms and character/exam low suspicion for UTI/pyelo/stone. CC is knee pain. Patient is well appearing, NAD, afebrile, hemodynamically stable. Any available tests and studies were discussed with patient and family. ACE wrapped knee. Discharged with instructions in further symptomatic care, return precautions, and need for PMD f/u.

## 2022-06-20 NOTE — ED PROVIDER NOTE - OBJECTIVE STATEMENT
Pt is a 68 y/o female with PMH of HLD and rectocele s/p surgical correction presenting for left knee pain x 2 weeks. Pain worsens with movement. Walks with a cane at baseline and has been able to ambulate well. Has wrapped her knee with mild relief. Denies any trauma or falls. No fever or erythema, swelling of knee.

## 2022-07-18 ENCOUNTER — NON-APPOINTMENT (OUTPATIENT)
Age: 67
End: 2022-07-18

## 2022-07-20 ENCOUNTER — OUTPATIENT (OUTPATIENT)
Dept: OUTPATIENT SERVICES | Facility: HOSPITAL | Age: 67
LOS: 1 days | Discharge: HOME | End: 2022-07-20

## 2022-07-20 ENCOUNTER — APPOINTMENT (OUTPATIENT)
Dept: UROGYNECOLOGY | Facility: CLINIC | Age: 67
End: 2022-07-20

## 2022-07-20 ENCOUNTER — RESULT CHARGE (OUTPATIENT)
Age: 67
End: 2022-07-20

## 2022-07-20 VITALS — DIASTOLIC BLOOD PRESSURE: 58 MMHG | SYSTOLIC BLOOD PRESSURE: 110 MMHG | BODY MASS INDEX: 35.7 KG/M2 | WEIGHT: 208 LBS

## 2022-07-20 DIAGNOSIS — Z98.890 OTHER SPECIFIED POSTPROCEDURAL STATES: Chronic | ICD-10-CM

## 2022-07-20 DIAGNOSIS — N30.10 INTERSTITIAL CYSTITIS (CHRONIC) W/OUT HEMATURIA: ICD-10-CM

## 2022-07-20 LAB
BILIRUB UR QL STRIP: NORMAL
CLARITY UR: CLEAR
COLLECTION METHOD: NORMAL
GLUCOSE UR-MCNC: NORMAL
HCG UR QL: 1 EU/DL
HGB UR QL STRIP.AUTO: NORMAL
KETONES UR-MCNC: NORMAL
LEUKOCYTE ESTERASE UR QL STRIP: NORMAL
NITRITE UR QL STRIP: NORMAL
PH UR STRIP: 6
PROT UR STRIP-MCNC: NORMAL
SP GR UR STRIP: 1.02

## 2022-07-20 PROCEDURE — 99214 OFFICE O/P EST MOD 30 MIN: CPT | Mod: 25

## 2022-07-20 PROCEDURE — 51702 INSERT TEMP BLADDER CATH: CPT

## 2022-07-20 NOTE — COUNSELING
[FreeTextEntry1] : If you feel like you have an infection it is important for you to call our office and we will arrange testing of your urine.\par \par We will contact you if the urine results are abnormal.\par \par Please continue taking Elmiron and Elavil. \par \par Please begin taking Flexeril 5 mg twice a day. It takes up to 6 weeks to go into full effect. Please  your refill when you complete the 1st bottle.\par \par Apply a pea size amount of the cream to the opening of the vagina every night for two weeks followed by three nights per week. (Monday, Wednesday, and Friday night)\par \par Please take 1 capful of Miralax daily \par \par Please call my office if you have any issues with the cost or side effects of the medication. \par \par Schedule a 6 week follow up med check appointment with JACI Marin.\par

## 2022-07-20 NOTE — DISCUSSION/SUMMARY
[FreeTextEntry1] : \par Myalgia: \par Rx Flexeril 5 mg BID\par Precautions reviewed with patient and her sister, they agree to start Flexeril 5 mg BID\par Will return for follow up in 6 weeks or earlier if she has any issues\par \par Constipation\par Miralax daily for constipation control\par \par Atrophic vaginitis: \par Reviewed the risks and benefits of starting low dose estrogen cream with patient and her sister Tina on the phone. They agree to starting estrogen cream. Prescription and instructions given.\par \par Interstitial Cystitis\par continue elmiron and elavil\par \par Urine culture obtained.\par Will follow up.\par Will treat accordingly if necessary\par

## 2022-07-20 NOTE — PHYSICAL EXAM
[Chaperone Present] : A chaperone was present in the examining room during all aspects of the physical examination [No Acute Distress] : in no acute distress [Well developed] : well developed [Well Nourished] : ~L well nourished [FreeTextEntry1] : Urethra was prepped in sterile fashion and then a sterile catheter (14F) was used by me to drain the bladder. The patient tolerated the procedure well.\par Cath: 80 cc\par \par +muscle spasms, reproducing the pain, bilateral pelvic pain on exam \par \par no bleeding, + atrophy\par \par <1cm erythematous area at apex of the vagina, not bleeding\par \par hard stool on exam

## 2022-07-20 NOTE — HISTORY OF PRESENT ILLNESS
[FreeTextEntry1] : \par Patient is here for follow up visit for her pelvic pain. \par Last seen 3/2/2022 for 8 week post op visit\par Spoke with patient's sister Tina on the phone who assisted in translating for the duration of the visit. \par \par S/p posterior colporrhaphy/perrineorhaphy 1/4/2022 \par \par Preoperative urodynamics:\par 7/21/21: urodynamics: sensitive bladder, no USUI, +obstructive voiding\par \par Patient is on elmiron 100mg three times a day helped for her pelvic and bladder pain when she takes  She was additionally started on elavil 10mg daily by her PCP on 11/3/2021\par She was started on Gabapentin 300 mg TID by neurology May 2022\par \par She notes that she feels pain ever since the surgery, she is not taking Miralax or any stool softeners. She has been having bowel movements, but needs to strain since they are hard. She feels pressure and cramping, feels "like she is having a baby". Feels the pain in the pelvis. \par \par She does not feel any burning with urination, denies any bleeding. She notes dryness in the vagina and has been using vaseline.\par \par \par \par \par \par \par

## 2022-07-21 LAB
APPEARANCE: CLEAR
BILIRUBIN URINE: NEGATIVE
BLOOD URINE: NEGATIVE
COLOR: YELLOW
GLUCOSE QUALITATIVE U: NORMAL
KETONES URINE: NEGATIVE
LEUKOCYTE ESTERASE URINE: NEGATIVE
NITRITE URINE: NEGATIVE
PH URINE: 6
PROTEIN URINE: NORMAL
SPECIFIC GRAVITY URINE: 1.02
UROBILINOGEN URINE: ABNORMAL

## 2022-07-23 LAB — BACTERIA UR CULT: NORMAL

## 2022-07-26 DIAGNOSIS — N30.10 INTERSTITIAL CYSTITIS (CHRONIC) WITHOUT HEMATURIA: ICD-10-CM

## 2022-07-26 DIAGNOSIS — M79.10 MYALGIA, UNSPECIFIED SITE: ICD-10-CM

## 2022-07-26 DIAGNOSIS — N95.2 POSTMENOPAUSAL ATROPHIC VAGINITIS: ICD-10-CM

## 2022-08-02 NOTE — ASU PATIENT PROFILE, ADULT - FALL HARM RISK - PATIENT NEEDS ASSISTANCE
SUBJECTIVE:   CC: Stephanie Trejo is an 62 year old woman who presents for preventive health visit.       Patient has been advised of split billing requirements and indicates understanding: Yes  Healthy Habits:     Getting at least 3 servings of Calcium per day:  Yes    Bi-annual eye exam:  Yes    Dental care twice a year:  Yes    Sleep apnea or symptoms of sleep apnea:  None    Diet:  Regular (no restrictions)    Frequency of exercise:  4-5 days/week    Duration of exercise:  15-30 minutes    Taking medications regularly:  No    Barriers to taking medications:  None    Medication side effects:  None    PHQ-2 Total Score: 1    Additional concerns today:  No       History of Present Illness       Reason for visit:  Yearly appt    She eats 2-3 servings of fruits and vegetables daily.She consumes 0 sweetened beverage(s) daily.She exercises with enough effort to increase her heart rate 60 or more minutes per day.  She exercises with enough effort to increase her heart rate 5 days per week.   She is taking medications regularly.    Today's PHQ-9         PHQ-9 Total Score: 2    PHQ-9 Q9 Thoughts of better off dead/self-harm past 2 weeks :   Not at all    How difficult have these problems made it for you to do your work, take care of things at home, or get along with other people: Not difficult at all    Hypertension Follow-up      Do you check your blood pressure regularly outside of the clinic? No     Are you following a low salt diet? Yes    Are your blood pressures ever more than 140 on the top number (systolic) OR more   than 90 on the bottom number (diastolic), for example 140/90?     Hypothyroidism Follow-up      Since last visit, patient describes the following symptoms: Weight stable, no hair loss, no skin changes, no constipation, no loose stools    Anxiety Follow-Up    How are you doing with your anxiety since your last visit? No change    Are you having other symptoms that might be associated with anxiety?  No    Have you had a significant life event? No     Are you feeling depressed? No    Do you have any concerns with your use of alcohol or other drugs? No    Social History     Tobacco Use     Smoking status: Never Smoker     Smokeless tobacco: Never Used   Substance Use Topics     Alcohol use: No     Alcohol/week: 0.0 standard drinks     Drug use: No     SEHRON-7 SCORE 12/4/2018 1/11/2020 3/5/2021   Total Score - - -   Total Score 1 (minimal anxiety) 0 (minimal anxiety) -   Total Score 1 0 0     PHQ 3/5/2021 6/22/2022 7/26/2022   PHQ-9 Total Score 0 1 2   Q9: Thoughts of better off dead/self-harm past 2 weeks Not at all Not at all Not at all           Today's PHQ-2 Score:   PHQ-2 ( 1999 [employer]) 8/2/2022   Q1: Little interest or pleasure in doing things 0   Q2: Feeling down, depressed or hopeless 1   PHQ-2 Score 1   PHQ-2 Total Score (12-17 Years)- Positive if 3 or more points; Administer PHQ-A if positive -   Q1: Little interest or pleasure in doing things -   Q2: Feeling down, depressed or hopeless -   PHQ-2 Score -     Back has been flaring   Long history of back pain has been using chiro for years  Is going back to spine MD as she is not able to control any longer     Abuse: Current or Past (Physical, Sexual or Emotional) - No  Do you feel safe in your environment? Yes        Social History     Tobacco Use     Smoking status: Never Smoker     Smokeless tobacco: Never Used   Substance Use Topics     Alcohol use: No     Alcohol/week: 0.0 standard drinks     If you drink alcohol do you typically have >3 drinks per day or >7 drinks per week? No    Alcohol Use 8/2/2022   Prescreen: >3 drinks/day or >7 drinks/week? -   Prescreen: >3 drinks/day or >7 drinks/week? No       Reviewed orders with patient.  Reviewed health maintenance and updated orders accordingly - Yes  Labs reviewed in EPIC    Breast Cancer Screening:    FHS-7:   Breast CA Risk Assessment (FHS-7) 3/28/2021 5/4/2022   Did any of your first-degree relatives  have breast or ovarian cancer? No No   Did any of your relatives have bilateral breast cancer? No No   Did any man in your family have breast cancer? No No   Did any woman in your family have breast and ovarian cancer? Yes No   Did any woman in your family have breast cancer before age 50 y? - No   Do you have 2 or more relatives with breast and/or ovarian cancer? No No   Do you have 2 or more relatives with breast and/or bowel cancer? No No       Mammogram Screening: Recommended mammography every 1-2 years with patient discussion and risk factor consideration  Pertinent mammograms are reviewed under the imaging tab.    History of abnormal Pap smear: NO - age 30-65 PAP every 5 years with negative HPV co-testing recommended  PAP / HPV Latest Ref Rng & Units 3/31/2021 11/29/2016 10/31/2013   PAP (Historical) - NIL NIL NIL   HPV16 NEG:Negative Negative Negative -   HPV18 NEG:Negative Negative Negative -   HRHPV NEG:Negative Negative Negative -     Reviewed and updated as needed this visit by clinical staff   Tobacco  Allergies  Meds  Problems  Med Hx  Surg Hx  Fam Hx            Reviewed and updated as needed this visit by Provider   Tobacco  Allergies  Meds  Problems  Med Hx  Surg Hx  Fam Hx               Review of Systems  CONSTITUTIONAL: NEGATIVE for fever, chills, change in weight  INTEGUMENTARY/SKIN: NEGATIVE for worrisome rashes, moles or lesions  EYES: NEGATIVE for vision changes or irritation  ENT: NEGATIVE for ear, mouth and throat problems  RESP: NEGATIVE for significant cough or SOB  BREAST: NEGATIVE for masses, tenderness or discharge  CV: NEGATIVE for chest pain, palpitations or peripheral edema  GI: NEGATIVE for nausea, abdominal pain, heartburn, or change in bowel habits  : NEGATIVE for unusual urinary or vaginal symptoms. No vaginal bleeding.  MUSCULOSKELETAL: NEGATIVE for significant arthralgias or myalgia  NEURO: NEGATIVE for weakness, dizziness or paresthesias  PSYCHIATRIC: NEGATIVE  "for changes in mood or affect      OBJECTIVE:   /76   Pulse 68   Temp 97.5  F (36.4  C) (Tympanic)   Resp 12   Ht 1.772 m (5' 9.75\")   Wt 92.5 kg (204 lb)   LMP 09/21/2012   SpO2 97%   BMI 29.48 kg/m    Physical Exam  GENERAL APPEARANCE: healthy, alert and no distress  EYES: Eyes grossly normal to inspection, PERRL and conjunctivae and sclerae normal  HENT: ear canals and TM's normal, nose and mouth without ulcers or lesions, oropharynx clear and oral mucous membranes moist  NECK: no adenopathy, no asymmetry, masses, or scars and thyroid normal to palpation  RESP: lungs clear to auscultation - no rales, rhonchi or wheezes  CV: regular rate and rhythm, normal S1 S2, no S3 or S4, no murmur, click or rub, no peripheral edema and peripheral pulses strong  ABDOMEN: soft, nontender, no hepatosplenomegaly, no masses and bowel sounds normal  MS: no musculoskeletal defects are noted and gait is age appropriate without ataxia  SKIN: no suspicious lesions or rashes  NEURO: Normal strength and tone, sensory exam grossly normal, mentation intact and speech normal  PSYCH: mentation appears normal and affect normal/bright    Diagnostic Test Results:  Labs reviewed in Epic    ASSESSMENT/PLAN:   (Z00.00) Routine general medical examination at a health care facility  (primary encounter diagnosis)  Comment:   Plan:     (E03.9) Hypothyroidism, unspecified type  Comment: Adjust meds as indicated by above labs.   Plan: levothyroxine (SYNTHROID/LEVOTHROID) 125 MCG         tablet            (I10) Essential hypertension  Comment: Stable no change in treatment plan.   Plan: losartan (COZAAR) 50 MG tablet,         hydrochlorothiazide (HYDRODIURIL) 25 MG tablet            (K21.9) Gastroesophageal reflux disease without esophagitis  Comment: Stable no change in treatment plan.   Plan: pantoprazole (PROTONIX) 40 MG EC tablet            (F51.04) Psychophysiological insomnia  Comment: Stable no change in treatment plan.   Plan: " "temazepam (RESTORIL) 15 MG capsule            (F41.9) Anxiety  Comment: Stable no change in treatment plan.   Plan: temazepam (RESTORIL) 15 MG capsule            (M54.50,  G89.29) Chronic bilateral low back pain, unspecified whether sciatica present  Comment: has appt with spine MD stable with meds now but has been flaring   Plan: cyclobenzaprine (FLEXERIL) 10 MG tablet,         Vitamin B12, Vitamin D Deficiency            (N18.31) Chronic kidney disease, stage 3a (H)  Comment:   Plan: Stable no change in treatment plan.     (G47.33) CAMELIA (obstructive sleep apnea)  Comment:   Plan:     Patient has been advised of split billing requirements and indicates understanding: Yes    COUNSELING:  Reviewed preventive health counseling, as reflected in patient instructions    Estimated body mass index is 29.48 kg/m  as calculated from the following:    Height as of this encounter: 1.772 m (5' 9.75\").    Weight as of this encounter: 92.5 kg (204 lb).        She reports that she has never smoked. She has never used smokeless tobacco.      Counseling Resources:  ATP IV Guidelines  Pooled Cohorts Equation Calculator  Breast Cancer Risk Calculator  BRCA-Related Cancer Risk Assessment: FHS-7 Tool  FRAX Risk Assessment  ICSI Preventive Guidelines  Dietary Guidelines for Americans, 2010  USDA's MyPlate  ASA Prophylaxis  Lung CA Screening    Radha Felton MD  [facility]          " No assistance needed

## 2022-11-07 ENCOUNTER — APPOINTMENT (OUTPATIENT)
Dept: UROGYNECOLOGY | Facility: CLINIC | Age: 67
End: 2022-11-07

## 2022-11-07 ENCOUNTER — OUTPATIENT (OUTPATIENT)
Dept: OUTPATIENT SERVICES | Facility: HOSPITAL | Age: 67
LOS: 1 days | Discharge: HOME | End: 2022-11-07

## 2022-11-07 VITALS — SYSTOLIC BLOOD PRESSURE: 116 MMHG | BODY MASS INDEX: 33.99 KG/M2 | DIASTOLIC BLOOD PRESSURE: 64 MMHG | WEIGHT: 198 LBS

## 2022-11-07 DIAGNOSIS — Z98.890 OTHER SPECIFIED POSTPROCEDURAL STATES: Chronic | ICD-10-CM

## 2022-11-07 PROCEDURE — 99214 OFFICE O/P EST MOD 30 MIN: CPT

## 2022-11-07 RX ORDER — CYCLOBENZAPRINE HYDROCHLORIDE 5 MG/1
5 TABLET, FILM COATED ORAL
Qty: 60 | Refills: 1 | Status: DISCONTINUED | COMMUNITY
Start: 2022-07-20 | End: 2022-11-07

## 2022-11-07 NOTE — HISTORY OF PRESENT ILLNESS
[FreeTextEntry1] : Patient is here for 4 months med check for myalgia.\par Last seen on 7/20/2022 for follow up for pelvic pain.\par Spoke with patient's son Brenton on the phone who assisted in translating for the visit. \par \par S/p posterior colporrhaphy/perrineorhaphy 1/4/2022 \par \par Preoperative urodynamics:\par 7/21/21: urodynamics: sensitive bladder, no USUI, +obstructive voiding\par \par Patient is on Gabapentin 300 mg TID by neurology May 2022\par No longer taking Elavil or Elmiron\par \par She notes that she feels pain in her abdomen after eating. She feels pressure and cramping in her pelvis. Notes that she took Flexeril 5 mg BID as prescribed but ran out a few months ago. Notes that it worked but not enough to get rid of the pain. She denies any side effects.\par \par Feels that sometimes she is up at night due to the pain\par \par Patient had a CT scan for back pain recently. \par \par She was happy with estrace cream and would like a new prescription. \par \par She does not feel any burning with urination and does not feel that she has an infection.\par \par Patient needs a new PCP as her PCP is no longer practicing. \par \par Patient would like an exam today to see if anything fell down as she feels that something fell in her vagina. \par \par \par \par \par \par

## 2022-11-07 NOTE — DISCUSSION/SUMMARY
[FreeTextEntry1] : \par Myalgia: \par Rx Flexeril 10 mg BID.\par Precautions reviewed.\par Will return in 6-8 weeks for follow up or earlier if she has any issues.\par \par Atrophic Vaginitis:\par Advised to continue to apply a pea size amount of the cream to the opening of the vagina three nights per week.\par \par Advised to follow up with a PCP for further evaluation of her abdominal pain. Patient give the contact information for a new PCP.  \par

## 2022-11-07 NOTE — COUNSELING
[FreeTextEntry1] : If you feel like you have an infection it is important for you to call our office and we will arrange testing of your urine.\par \par Please begin taking Flexeril 10 mg twice a day. It takes up to 6 weeks to go into full effect. Please  your refill when you complete the 1st bottle.\par \par Please continue applying a pea size amount of estrogen cream to the opening of the vagina three times per week. (Monday, Wednesday, and Friday night)\par \par Please call my office if you have any issues with the cost or side effects of the medication. \par \par Schedule a 6-8 week follow up med check appointment with JACI Wilder or JACI Marin.\par \par Please follow up with a primary care doctor regarding you pain in your abdomen.  all other ROS negative except as per HPI

## 2022-11-07 NOTE — PHYSICAL EXAM
[No Acute Distress] : in no acute distress [Well developed] : well developed [Well Nourished] : ~L well nourished [Chaperone Present] : A chaperone was present in the examining room during all aspects of the physical examination [FreeTextEntry1] : \par Well supported vaginal walls, very small amount of prolapse of anterior vaginal wall\par \par no bleeding or discharge on exam

## 2022-12-23 ENCOUNTER — APPOINTMENT (OUTPATIENT)
Dept: UROGYNECOLOGY | Facility: CLINIC | Age: 67
End: 2022-12-23

## 2022-12-23 VITALS
SYSTOLIC BLOOD PRESSURE: 137 MMHG | DIASTOLIC BLOOD PRESSURE: 82 MMHG | HEIGHT: 64 IN | WEIGHT: 198 LBS | BODY MASS INDEX: 33.8 KG/M2 | HEART RATE: 83 BPM

## 2022-12-23 PROCEDURE — 99214 OFFICE O/P EST MOD 30 MIN: CPT

## 2022-12-23 RX ORDER — PENTOSAN POLYSULFATE SODIUM 100 MG/1
100 CAPSULE, GELATIN COATED ORAL 3 TIMES DAILY
Qty: 90 | Refills: 5 | Status: COMPLETED | COMMUNITY
Start: 2021-07-23 | End: 2022-12-23

## 2022-12-23 RX ORDER — AMITRIPTYLINE HYDROCHLORIDE 10 MG/1
10 TABLET, FILM COATED ORAL
Qty: 30 | Refills: 2 | Status: COMPLETED | COMMUNITY
Start: 2021-11-03 | End: 2022-12-23

## 2022-12-23 NOTE — PHYSICAL EXAM
[Chaperone Present] : A chaperone was present in the examining room during all aspects of the physical examination [No Acute Distress] : in no acute distress [Well developed] : well developed [Well Nourished] : ~L well nourished [FreeTextEntry1] : \par Well supported vaginal walls, very small amount of prolapse of anterior vaginal wall\par \par no bleeding or discharge on exam. \par \par

## 2022-12-23 NOTE — DISCUSSION/SUMMARY
[FreeTextEntry1] : \par Myalgia\par Continue Flexeril 10 mg BID\par Counseled the patient on the options for further management including pelvic floor physical therapy, referral to pelvic pain specialist, and vaginal valium suppositories. She would like to try vaginal valium suppositories. Rx sent, precautions reviewed. Will return for follow up in 6-8 weeks or earlier if she has any issues\par \par Advised to follow up with PCP for abdominal pain that she was bothered by at the last visit, patient has not gone to PCP yet.\par \par Atrophic vaginitis\par Advised to apply cream three nights per week, refill sent\par \par

## 2022-12-23 NOTE — COUNSELING
[FreeTextEntry1] : If you feel like you have an infection it is important for you to call our office and we will arrange testing of your urine.\par \par Please continue taking Flexeril 10 mg twice a day\par \par Please begin taking vaginal valium suppositories twice a day, it can make you sleepy. It takes up to 6 weeks to go into full effect. Please  your refill when you complete the 1st bottle.\par \par Valium suppositories were sent to [facility]: [phone]\par \par Please continue to apply a pea size amount to the opening of the vagina three times a week. \par \par Please call my office if you have any issues with the cost or side effects of the medication. \par \par Schedule a 6-8 week follow up med check appointment with JACI Wilder or JACI Marin.\par \par Please follow up with a primary care doctor \par \par

## 2022-12-23 NOTE — HISTORY OF PRESENT ILLNESS
[FreeTextEntry1] : Patient is here for 6 weeks med check for myalgia.\par Last seen on 11/7/2022 for med check.\par \par Spoke with patient's son Brenton on the phone who assisted in translating for the visit. \par \par S/p posterior colporrhaphy/perrineorhaphy 1/4/2022 \par \par Preoperative urodynamics:\par 7/21/21: urodynamics: sensitive bladder, no USUI, +obstructive voiding\par \par Patient is on Gabapentin 300 mg TID by neurology May 2022\par No longer taking Elavil or Elmiron\par \par s/p Flexeril 5 mg BID, no improvement\par Flexeril 10 mg BID\par estrace\par \par Today, patient states she is seeing minimal improvement with Flexeril 10 mg BID but is still experiencing pain. Using estrace nightly. Denies side effects. Patient does not feel she has an infection. She feels something coming out of her vagina when she walks. \par \par Patient would like to continue Flexeril 10 mg BID and add another medication\par

## 2023-02-17 ENCOUNTER — APPOINTMENT (OUTPATIENT)
Dept: UROGYNECOLOGY | Facility: CLINIC | Age: 68
End: 2023-02-17
Payer: MEDICAID

## 2023-02-17 VITALS
SYSTOLIC BLOOD PRESSURE: 161 MMHG | WEIGHT: 198 LBS | DIASTOLIC BLOOD PRESSURE: 91 MMHG | HEART RATE: 80 BPM | BODY MASS INDEX: 33.8 KG/M2 | HEIGHT: 64 IN

## 2023-02-17 PROCEDURE — 99214 OFFICE O/P EST MOD 30 MIN: CPT

## 2023-02-17 NOTE — HISTORY OF PRESENT ILLNESS
[FreeTextEntry1] : Patient is here for 6 weeks med check for myalgia. \par Last seen on 12/23/22 for med check.\par \par S/p posterior colporrhaphy/perrineorhaphy 1/4/2022 \par \par Preoperative urodynamics:\par 7/21/21: urodynamics: sensitive bladder, no USUI, +obstructive voiding\par \par Patient is on Gabapentin 300 mg TID by neurology May 2022   ? not sure if she's still taking? \par S/p  Elavil or Elmiron\par \par s/p Flexeril 5 mg BID, no improvement\par Flexeril 10 mg BID\par estrace\par \par Today, patient states she is happy with Valium tablets uses only 1 a day, states it's helping a little. She did not know to use it twice a day. Pt is using estrogen cream, pt does not know the rest of her meds that she is taking.Denies side effects. Patient does not feel she has an infection.States she was doing much better, but this week c/o increased vaginal pain. Also c/o generalized abdominal pain. Was advised to see her PCP at last visit. States she made an appt. \par \par \par

## 2023-02-17 NOTE — PHYSICAL EXAM
[Chaperone Present] : A chaperone was present in the examining room during all aspects of the physical examination [No Acute Distress] : in no acute distress [Well developed] : well developed [Well Nourished] : ~L well nourished [FreeTextEntry1] : Minimal cystocele on exam\par Mild tenderness on posterior vaginal wall \par Neg cough test\par white estrogen cream seen on exam

## 2023-02-17 NOTE — DISCUSSION/SUMMARY
[FreeTextEntry1] : Myalgia\par Continue Flexeril 10mg BID\par Advised to start using Valium tabs BID\par Pt will call for refill, she still has \par Precautions reviewed.\par Will return in 3 months for follow up or earlier if she has any issues.\par \par Atrophic Vaginitis:\par Advised to continue to apply a pea size amount of the cream to the opening of the vagina three nights per week.\par

## 2023-02-17 NOTE — COUNSELING
[FreeTextEntry1] : If you feel like you have an infection it is important for you to call our office and we will arrange testing of your urine.\par \par Please increase Valium tablets TWO TIMES A DAY inside vagina for pain. \par \par Please continue to apply a pea size amount of estrogen cream to the opening of the vagina three times a week. \par \par Please see your medical doctor for abdominal pain.\par \par Please call my office if you have any issues with the cost or side effects of the medication. \par \par Schedule a 3 months follow up med check appointment with JACI Marin or JACI Wilder.\par

## 2023-03-17 ENCOUNTER — OUTPATIENT (OUTPATIENT)
Dept: OUTPATIENT SERVICES | Facility: HOSPITAL | Age: 68
LOS: 1 days | End: 2023-03-17
Payer: MEDICAID

## 2023-03-17 ENCOUNTER — APPOINTMENT (OUTPATIENT)
Dept: INTERNAL MEDICINE | Facility: CLINIC | Age: 68
End: 2023-03-17

## 2023-03-17 ENCOUNTER — NON-APPOINTMENT (OUTPATIENT)
Age: 68
End: 2023-03-17

## 2023-03-17 ENCOUNTER — APPOINTMENT (OUTPATIENT)
Dept: INTERNAL MEDICINE | Facility: CLINIC | Age: 68
End: 2023-03-17
Payer: MEDICAID

## 2023-03-17 VITALS
SYSTOLIC BLOOD PRESSURE: 163 MMHG | WEIGHT: 205 LBS | BODY MASS INDEX: 35 KG/M2 | DIASTOLIC BLOOD PRESSURE: 124 MMHG | HEIGHT: 64 IN

## 2023-03-17 VITALS — DIASTOLIC BLOOD PRESSURE: 85 MMHG | SYSTOLIC BLOOD PRESSURE: 152 MMHG

## 2023-03-17 DIAGNOSIS — Z98.890 OTHER SPECIFIED POSTPROCEDURAL STATES: Chronic | ICD-10-CM

## 2023-03-17 DIAGNOSIS — E66.9 OBESITY, UNSPECIFIED: ICD-10-CM

## 2023-03-17 DIAGNOSIS — Z00.00 ENCOUNTER FOR GENERAL ADULT MEDICAL EXAMINATION WITHOUT ABNORMAL FINDINGS: ICD-10-CM

## 2023-03-17 DIAGNOSIS — M25.519 PAIN IN UNSPECIFIED SHOULDER: ICD-10-CM

## 2023-03-17 DIAGNOSIS — R12 HEARTBURN: ICD-10-CM

## 2023-03-17 DIAGNOSIS — K59.09 OTHER CONSTIPATION: ICD-10-CM

## 2023-03-17 DIAGNOSIS — R41.3 OTHER AMNESIA: ICD-10-CM

## 2023-03-17 DIAGNOSIS — M25.512 PAIN IN LEFT SHOULDER: ICD-10-CM

## 2023-03-17 PROCEDURE — 73030 X-RAY EXAM OF SHOULDER: CPT | Mod: LT

## 2023-03-17 PROCEDURE — 99214 OFFICE O/P EST MOD 30 MIN: CPT

## 2023-03-17 PROCEDURE — 99214 OFFICE O/P EST MOD 30 MIN: CPT | Mod: GC

## 2023-03-17 PROCEDURE — 73030 X-RAY EXAM OF SHOULDER: CPT | Mod: 26,LT

## 2023-03-17 NOTE — ASSESSMENT
[FreeTextEntry1] : 68 year old female, known history of spinal stenosis, rectocele, hyperlipidemia. lost follow up for two years, came for follow up and evaluation  \par \par # Rectocele S/P surgery 1/22\par # Detrusor dysfunction \par # vaginal prolapse\par - follows with uro/gyn \par - on vaginal cream with Valium suppositories \par - started on nortriptyline \par \par # Severe back pain 2/2 severe spinal stenosis \par - on naproxen as needed \par - gabapentin 300X3 \par - Flexeril 10 mg 3 times  \par - advised on exercise and weight loss \par  \par # Dyslipidemia\par - was prescribed statin on last visit not taking it now\par - will repeat labs may resume statin if needed \par \par # pre-DM, last hba1c:6.4%\par - diet and lifestyle modification encouraged. \par - will repeat labs \par \par # Newly diagnosed HTN\par - 164/90 on multiple office visit\par - start on losartan 50 mg daily \par \par # Poor memory\par - neuropsychology referral \par \par # Epigastric pain with heartburn\par - mainly postprandial\par - start pantoprazole for 1 month\par - US abdomen for gallbladder disease\par - referral for GI for possible EGD \par \par # Left shoulder pain\par - acute, with tenderness and limited range of motion\par - Xray of left shoulder \par \par #health care maintenance\par - last colonoscopy 2016, will refer to GI for symptoms of chronic constipation\par - will order mammogram \par - RTC in 3 months and as needed\par

## 2023-03-17 NOTE — PHYSICAL EXAM
[No Acute Distress] : no acute distress [Normal Sclera/Conjunctiva] : normal sclera/conjunctiva [No Respiratory Distress] : no respiratory distress  [Clear to Auscultation] : lungs were clear to auscultation bilaterally [Normal S1, S2] : normal S1 and S2 [No Edema] : there was no peripheral edema [Soft] : abdomen soft [No Masses] : no abdominal mass palpated [Normal Bowel Sounds] : normal bowel sounds [No CVA Tenderness] : no CVA  tenderness [No Rash] : no rash [No Focal Deficits] : no focal deficits [de-identified] : mild hypogastric tenderness on deep palpation  [de-identified] : spinal tenderness [de-identified] : left shoulder tenderness with no swelling but limited range of motion due to pain

## 2023-03-17 NOTE — REVIEW OF SYSTEMS
[Abdominal Pain] : abdominal pain [Nausea] : nausea [Constipation] : constipation [Heartburn] : heartburn [Joint Pain] : joint pain [Muscle Pain] : muscle pain [Back Pain] : back pain [Fever] : no fever [Chest Pain] : no chest pain [Palpitations] : no palpitations [Orthopnea] : no orthopnea [Shortness Of Breath] : no shortness of breath [Wheezing] : no wheezing [Cough] : no cough [Vomiting] : no vomiting [Dysuria] : no dysuria [Itching] : no itching [Skin Rash] : no skin rash [Headache] : no headache [Dizziness] : no dizziness

## 2023-03-17 NOTE — HISTORY OF PRESENT ILLNESS
[FreeTextEntry1] : follow up  [de-identified] : 68 year old female, known history of spinal stenosis, rectocele, hyperlipidemia. \par \par She is complaining from abdominal epigastric post prandial pain with heartburn. chronic constipation with abdominal distension. no vomiting or hematochezia, melena or hematemesis. She has no fever or chills. \par She is also complaining from chronic back pain, with no radiation. improving with pain medications\par \par She also has left shoulder pain since one week with limited range of motion, no swelling.

## 2023-03-18 DIAGNOSIS — E66.9 OBESITY, UNSPECIFIED: ICD-10-CM

## 2023-03-18 DIAGNOSIS — M25.512 PAIN IN LEFT SHOULDER: ICD-10-CM

## 2023-03-18 LAB
ALBUMIN SERPL ELPH-MCNC: 4.6 G/DL
ALP BLD-CCNC: 89 U/L
ALT SERPL-CCNC: 16 U/L
ANION GAP SERPL CALC-SCNC: 11 MMOL/L
AST SERPL-CCNC: 27 U/L
BASOPHILS # BLD AUTO: 0.04 K/UL
BASOPHILS NFR BLD AUTO: 0.9 %
BILIRUB SERPL-MCNC: 0.3 MG/DL
BUN SERPL-MCNC: 13 MG/DL
CALCIUM SERPL-MCNC: 9.8 MG/DL
CHLORIDE SERPL-SCNC: 104 MMOL/L
CHOLEST SERPL-MCNC: 222 MG/DL
CO2 SERPL-SCNC: 27 MMOL/L
CREAT SERPL-MCNC: 0.8 MG/DL
EGFR: 80 ML/MIN/1.73M2
EOSINOPHIL # BLD AUTO: 0.18 K/UL
EOSINOPHIL NFR BLD AUTO: 3.9 %
ESTIMATED AVERAGE GLUCOSE: 128 MG/DL
GLUCOSE SERPL-MCNC: 93 MG/DL
HBA1C MFR BLD HPLC: 6.1 %
HCT VFR BLD CALC: 36.5 %
HDLC SERPL-MCNC: 50 MG/DL
HGB BLD-MCNC: 10.8 G/DL
IMM GRANULOCYTES NFR BLD AUTO: 0.2 %
LDLC SERPL CALC-MCNC: 160 MG/DL
LYMPHOCYTES # BLD AUTO: 1.9 K/UL
LYMPHOCYTES NFR BLD AUTO: 41.6 %
MAN DIFF?: NORMAL
MCHC RBC-ENTMCNC: 21.6 PG
MCHC RBC-ENTMCNC: 29.6 G/DL
MCV RBC AUTO: 73.1 FL
MONOCYTES # BLD AUTO: 0.51 K/UL
MONOCYTES NFR BLD AUTO: 11.2 %
NEUTROPHILS # BLD AUTO: 1.93 K/UL
NEUTROPHILS NFR BLD AUTO: 42.2 %
NONHDLC SERPL-MCNC: 172 MG/DL
PLATELET # BLD AUTO: 269 K/UL
POTASSIUM SERPL-SCNC: 5 MMOL/L
PROT SERPL-MCNC: 7.4 G/DL
RBC # BLD: 4.99 M/UL
RBC # FLD: 16.2 %
SODIUM SERPL-SCNC: 142 MMOL/L
TRIGL SERPL-MCNC: 61 MG/DL
TSH SERPL-ACNC: 1.78 UIU/ML
WBC # FLD AUTO: 4.57 K/UL

## 2023-03-20 DIAGNOSIS — M25.519 PAIN IN UNSPECIFIED SHOULDER: ICD-10-CM

## 2023-03-20 DIAGNOSIS — K59.09 OTHER CONSTIPATION: ICD-10-CM

## 2023-03-20 DIAGNOSIS — R12 HEARTBURN: ICD-10-CM

## 2023-03-20 DIAGNOSIS — E66.9 OBESITY, UNSPECIFIED: ICD-10-CM

## 2023-03-20 DIAGNOSIS — R41.3 OTHER AMNESIA: ICD-10-CM

## 2023-03-20 DIAGNOSIS — I10 ESSENTIAL (PRIMARY) HYPERTENSION: ICD-10-CM

## 2023-03-29 ENCOUNTER — NON-APPOINTMENT (OUTPATIENT)
Age: 68
End: 2023-03-29

## 2023-03-30 ENCOUNTER — NON-APPOINTMENT (OUTPATIENT)
Age: 68
End: 2023-03-30

## 2023-04-21 LAB
HGB A MFR BLD: 97.5 %
HGB A2 MFR BLD: 2.5 %
HGB FRACT BLD-IMP: NORMAL
IRON SATN MFR SERPL: 19 %
IRON SERPL-MCNC: 64 UG/DL
TIBC SERPL-MCNC: 333 UG/DL
UIBC SERPL-MCNC: 269 UG/DL

## 2023-05-18 ENCOUNTER — APPOINTMENT (OUTPATIENT)
Dept: UROGYNECOLOGY | Facility: CLINIC | Age: 68
End: 2023-05-18

## 2023-05-23 ENCOUNTER — APPOINTMENT (OUTPATIENT)
Dept: UROGYNECOLOGY | Facility: CLINIC | Age: 68
End: 2023-05-23
Payer: MEDICAID

## 2023-05-23 VITALS
HEIGHT: 64 IN | SYSTOLIC BLOOD PRESSURE: 143 MMHG | BODY MASS INDEX: 35 KG/M2 | HEART RATE: 67 BPM | WEIGHT: 205 LBS | DIASTOLIC BLOOD PRESSURE: 80 MMHG

## 2023-05-23 DIAGNOSIS — M79.10 MYALGIA, UNSPECIFIED SITE: ICD-10-CM

## 2023-05-23 DIAGNOSIS — N95.2 POSTMENOPAUSAL ATROPHIC VAGINITIS: ICD-10-CM

## 2023-05-23 PROCEDURE — 99214 OFFICE O/P EST MOD 30 MIN: CPT

## 2023-05-23 RX ORDER — ATORVASTATIN CALCIUM 40 MG/1
40 TABLET, FILM COATED ORAL
Qty: 30 | Refills: 5 | Status: COMPLETED | COMMUNITY
Start: 2021-11-03 | End: 2023-05-23

## 2023-05-23 RX ORDER — ESTRADIOL 0.1 MG/G
0.1 CREAM VAGINAL
Qty: 1 | Refills: 0 | Status: COMPLETED | COMMUNITY
Start: 2022-07-20 | End: 2023-05-23

## 2023-05-23 RX ORDER — DIAZEPAM 10 MG/1
10 TABLET ORAL
Qty: 60 | Refills: 1 | Status: ACTIVE | COMMUNITY
Start: 2022-12-23 | End: 1900-01-01

## 2023-05-23 NOTE — COUNSELING
[FreeTextEntry1] : If you feel like you have an infection it is important for you to call our office and we will arrange testing of your urine.\par \par Please continue taking Flexeril 10 mg twice a day for the pain. \par \par Please insert Valium tablets twice a day inside vagina for pain. \par \par Please continue to apply a pea size amount of estrogen cream to the opening of the vagina three times a week. \par \par Please see your medical doctor if the pain continues\par \par Please call my office if you have any issues with the cost or side effects of the medication. \par \par Schedule a 6 months follow up med check appointment with JACI Marin or JACI Wilder\par \par

## 2023-05-23 NOTE — HISTORY OF PRESENT ILLNESS
[FreeTextEntry1] : Patient is here for 3 month med check for myalgia. \par Last seen on 2/17/2023 for med check.\par \par S/p posterior colporrhaphy/perrineorhaphy 1/4/2022 \par \par Preoperative urodynamics:\par 7/21/21: urodynamics: sensitive bladder, no USUI, +obstructive voiding\par \par Patient is on Gabapentin 300 mg TID by neurology May 2022 ? not sure if she's still taking? \par S/p Elavil or Elmiron\par \par s/p Flexeril 5 mg BID, no improvement\par Flexeril 10 mg BID\par estrace\par vaginal valium suppositories. \par \par Visit with patient's son on the phone. \par \par Today, patient states she is happy with vaginal valium suppositories twice a day and Flexeril 10 mg BID, however she ran out of the medications. Denies side effects. Patient does not feel she has an infection. States she was doing much better, but does experience some pain a few hours after taking the medications. Has not taken medication since she ran out. \par \par She is bothered by the bulge and wants to know the options. \par \par

## 2023-05-23 NOTE — DISCUSSION/SUMMARY
[FreeTextEntry1] : \par Myalgia\par Continue Flexeril 10mg BID\par Continue vaginal Valium suppositories\par Will send refills \par Precautions reviewed.\par Will return in 6 months for follow up or earlier if she has any issues.\par Advised if she does not feel enough improvement with these medications, can consider pelvic floor physical therapy or referral to pain specialist. Patient would like to continue with current medications. \par \par Atrophic Vaginitis:\par Advised to continue to apply a pea size amount of the cream to the opening of the vagina three nights per week.\par  \par Reviewed options for prolapse/cystocele including observation, pelvic floor physical therapy, pessary or surgery. She would like to manage the pain first and will think about the options. \par \par

## 2023-06-20 ENCOUNTER — OUTPATIENT (OUTPATIENT)
Dept: OUTPATIENT SERVICES | Facility: HOSPITAL | Age: 68
LOS: 1 days | End: 2023-06-20
Payer: MEDICAID

## 2023-06-20 ENCOUNTER — APPOINTMENT (OUTPATIENT)
Dept: INTERNAL MEDICINE | Facility: CLINIC | Age: 68
End: 2023-06-20
Payer: MEDICAID

## 2023-06-20 VITALS
OXYGEN SATURATION: 100 % | TEMPERATURE: 98.3 F | DIASTOLIC BLOOD PRESSURE: 88 MMHG | HEIGHT: 64 IN | HEART RATE: 85 BPM | BODY MASS INDEX: 35.17 KG/M2 | SYSTOLIC BLOOD PRESSURE: 122 MMHG | WEIGHT: 206 LBS

## 2023-06-20 DIAGNOSIS — M54.16 RADICULOPATHY, LUMBAR REGION: ICD-10-CM

## 2023-06-20 DIAGNOSIS — N31.8 OTHER NEUROMUSCULAR DYSFUNCTION OF BLADDER: ICD-10-CM

## 2023-06-20 DIAGNOSIS — M19.90 UNSPECIFIED OSTEOARTHRITIS, UNSPECIFIED SITE: ICD-10-CM

## 2023-06-20 DIAGNOSIS — M48.00 SPINAL STENOSIS, SITE UNSPECIFIED: ICD-10-CM

## 2023-06-20 DIAGNOSIS — Z00.00 ENCOUNTER FOR GENERAL ADULT MEDICAL EXAMINATION WITHOUT ABNORMAL FINDINGS: ICD-10-CM

## 2023-06-20 DIAGNOSIS — Z98.890 OTHER SPECIFIED POSTPROCEDURAL STATES: Chronic | ICD-10-CM

## 2023-06-20 DIAGNOSIS — R73.03 PREDIABETES.: ICD-10-CM

## 2023-06-20 DIAGNOSIS — M19.019 PRIMARY OSTEOARTHRITIS, UNSPECIFIED SHOULDER: ICD-10-CM

## 2023-06-20 PROCEDURE — 99214 OFFICE O/P EST MOD 30 MIN: CPT

## 2023-06-20 PROCEDURE — 99214 OFFICE O/P EST MOD 30 MIN: CPT | Mod: GC

## 2023-06-21 ENCOUNTER — OUTPATIENT (OUTPATIENT)
Dept: OUTPATIENT SERVICES | Facility: HOSPITAL | Age: 68
LOS: 1 days | End: 2023-06-21
Payer: MEDICAID

## 2023-06-21 ENCOUNTER — APPOINTMENT (OUTPATIENT)
Dept: GASTROENTEROLOGY | Facility: CLINIC | Age: 68
End: 2023-06-21
Payer: MEDICAID

## 2023-06-21 VITALS
TEMPERATURE: 97.7 F | DIASTOLIC BLOOD PRESSURE: 85 MMHG | HEART RATE: 72 BPM | WEIGHT: 204 LBS | BODY MASS INDEX: 34.83 KG/M2 | HEIGHT: 64 IN | OXYGEN SATURATION: 99 % | SYSTOLIC BLOOD PRESSURE: 134 MMHG

## 2023-06-21 DIAGNOSIS — E78.5 HYPERLIPIDEMIA, UNSPECIFIED: ICD-10-CM

## 2023-06-21 DIAGNOSIS — Z00.00 ENCOUNTER FOR GENERAL ADULT MEDICAL EXAMINATION WITHOUT ABNORMAL FINDINGS: ICD-10-CM

## 2023-06-21 DIAGNOSIS — M48.00 SPINAL STENOSIS, SITE UNSPECIFIED: ICD-10-CM

## 2023-06-21 DIAGNOSIS — R73.03 PREDIABETES: ICD-10-CM

## 2023-06-21 DIAGNOSIS — Z12.11 ENCOUNTER FOR SCREENING FOR MALIGNANT NEOPLASM OF COLON: ICD-10-CM

## 2023-06-21 DIAGNOSIS — M19.019 PRIMARY OSTEOARTHRITIS, UNSPECIFIED SHOULDER: ICD-10-CM

## 2023-06-21 DIAGNOSIS — Z98.890 OTHER SPECIFIED POSTPROCEDURAL STATES: Chronic | ICD-10-CM

## 2023-06-21 DIAGNOSIS — N31.8 OTHER NEUROMUSCULAR DYSFUNCTION OF BLADDER: ICD-10-CM

## 2023-06-21 DIAGNOSIS — I10 ESSENTIAL (PRIMARY) HYPERTENSION: ICD-10-CM

## 2023-06-21 DIAGNOSIS — M54.16 RADICULOPATHY, LUMBAR REGION: ICD-10-CM

## 2023-06-21 PROCEDURE — 99204 OFFICE O/P NEW MOD 45 MIN: CPT | Mod: GC

## 2023-06-21 PROCEDURE — 99204 OFFICE O/P NEW MOD 45 MIN: CPT

## 2023-06-21 NOTE — HISTORY OF PRESENT ILLNESS
[de-identified] : 06/19/2015:\par - A hiatus hernia in the GE junction\par - A polyp was found in the fundus s/p biopsies \par - Ulcers found in the antrum - pyl\par  [FreeTextEntry1] : 06/2015:\par - nonbleeding  AVM \par - Biopsies negative

## 2023-06-21 NOTE — HISTORY OF PRESENT ILLNESS
[FreeTextEntry1] : follow up  [de-identified] : 67 yo F, PMH of spinal stenosis, rectocele, hypertension, hyperlipidemia, prediabetes, left shoulder pain, presents for follow up and medication refill. Pt is complaining of vaginal pain 2/2 rectocele and vaginal prolapse, pt is following with her OBGYN. Pt was started on cyclobenzaprine 10mg BID, started two weeks, which pt does not feel like it helps as of yet. Pt was counseled to f/u with OBGYN in 1 month if medication has not started working. Pt is not compliant with other medications, gabapentin, atorvastatin, or losartan. Pt was counseled on importance of taking medications, and labs from two months ago were reviewed. For left shoulder pain, Xray findings were reviewed, showing age -related degenerative changes in AC joint. Pt was recommended to see physical therapy but patient is unwilling at this time.

## 2023-06-21 NOTE — PLAN
[FreeTextEntry1] : 67 yo F, PMH of spinal stenosis, rectocele, hyperlipidemia, dyslipidemia, left shoulder pain\par \par #rectocele s/p surgery 1/22,\par # destrusor dysfunction, \par #vaginal prolapse\par -follows with uro/gyn\par -continue with cyglobenzaprine 10mg BID \par \par #back pain 2/2 spinal stenosis \par -continue naproxen \par -c/w gabapentin 300x3\par \par #dyslipidemia\par -labs reviewed, poorly controlled\par -c/w atorvastatin 40mg QD\par \par #pre-DM, hba1c 6.1%, last time 6.4%\par -tredning downwards, continue with lifestyle modifications \par -repeat labs as needed\par \par #HTN\par -c/w losartan 50mg QD\par \par #left shoulder pain\par -Xray  reviewed, showing age-related degenerative changes \par -PT counseled to go to physical therapy \par \par #follow up in 6 months and prn, repeat labs at that time

## 2023-06-21 NOTE — PHYSICAL EXAM
[Alert] : alert [Normal Voice/Communication] : normal voice/communication [Healthy Appearing] : healthy appearing [Sclera] : the sclera and conjunctiva were normal [Normal Appearance] : the appearance of the neck was normal [No Neck Mass] : no neck mass was observed [No Respiratory Distress] : no respiratory distress [Respiration, Rhythm And Depth] : normal respiratory rhythm and effort [Auscultation Breath Sounds / Voice Sounds] : lungs were clear to auscultation bilaterally [Heart Rate And Rhythm] : heart rate was normal and rhythm regular [Normal S1, S2] : normal S1 and S2 [Murmurs] : no murmurs [Bowel Sounds] : normal bowel sounds [Normal Color / Pigmentation] : normal skin color and pigmentation [] : no rash [Oriented To Time, Place, And Person] : oriented to person, place, and time [de-identified] : Tenderness on palpation to epigastric area

## 2023-06-28 DIAGNOSIS — K21.9 GASTRO-ESOPHAGEAL REFLUX DISEASE WITHOUT ESOPHAGITIS: ICD-10-CM

## 2023-06-28 DIAGNOSIS — Z12.11 ENCOUNTER FOR SCREENING FOR MALIGNANT NEOPLASM OF COLON: ICD-10-CM

## 2023-07-28 ENCOUNTER — RESULT REVIEW (OUTPATIENT)
Age: 68
End: 2023-07-28

## 2023-07-28 ENCOUNTER — OUTPATIENT (OUTPATIENT)
Dept: OUTPATIENT SERVICES | Facility: HOSPITAL | Age: 68
LOS: 1 days | End: 2023-07-28
Payer: MEDICAID

## 2023-07-28 DIAGNOSIS — Z12.31 ENCOUNTER FOR SCREENING MAMMOGRAM FOR MALIGNANT NEOPLASM OF BREAST: ICD-10-CM

## 2023-07-28 DIAGNOSIS — Z98.890 OTHER SPECIFIED POSTPROCEDURAL STATES: Chronic | ICD-10-CM

## 2023-07-28 PROCEDURE — 77063 BREAST TOMOSYNTHESIS BI: CPT

## 2023-07-28 PROCEDURE — 77063 BREAST TOMOSYNTHESIS BI: CPT | Mod: 26

## 2023-07-28 PROCEDURE — 77067 SCR MAMMO BI INCL CAD: CPT

## 2023-07-28 PROCEDURE — 77067 SCR MAMMO BI INCL CAD: CPT | Mod: 26

## 2023-07-29 DIAGNOSIS — Z12.31 ENCOUNTER FOR SCREENING MAMMOGRAM FOR MALIGNANT NEOPLASM OF BREAST: ICD-10-CM

## 2023-08-30 ENCOUNTER — EMERGENCY (EMERGENCY)
Facility: HOSPITAL | Age: 68
LOS: 0 days | Discharge: HOME | End: 2023-08-30
Attending: STUDENT IN AN ORGANIZED HEALTH CARE EDUCATION/TRAINING PROGRAM
Payer: MEDICAID

## 2023-08-30 VITALS
SYSTOLIC BLOOD PRESSURE: 206 MMHG | TEMPERATURE: 98 F | DIASTOLIC BLOOD PRESSURE: 91 MMHG | WEIGHT: 203.05 LBS | OXYGEN SATURATION: 100 % | HEART RATE: 62 BPM | RESPIRATION RATE: 16 BRPM

## 2023-08-30 VITALS — SYSTOLIC BLOOD PRESSURE: 206 MMHG | DIASTOLIC BLOOD PRESSURE: 95 MMHG

## 2023-08-30 DIAGNOSIS — Z87.19 PERSONAL HISTORY OF OTHER DISEASES OF THE DIGESTIVE SYSTEM: ICD-10-CM

## 2023-08-30 DIAGNOSIS — R10.84 GENERALIZED ABDOMINAL PAIN: ICD-10-CM

## 2023-08-30 DIAGNOSIS — E78.00 PURE HYPERCHOLESTEROLEMIA, UNSPECIFIED: ICD-10-CM

## 2023-08-30 DIAGNOSIS — Z98.890 OTHER SPECIFIED POSTPROCEDURAL STATES: Chronic | ICD-10-CM

## 2023-08-30 LAB
ALBUMIN SERPL ELPH-MCNC: 4 G/DL — SIGNIFICANT CHANGE UP (ref 3.5–5.2)
ALP SERPL-CCNC: 89 U/L — SIGNIFICANT CHANGE UP (ref 30–115)
ALT FLD-CCNC: 17 U/L — SIGNIFICANT CHANGE UP (ref 0–41)
ANION GAP SERPL CALC-SCNC: 7 MMOL/L — SIGNIFICANT CHANGE UP (ref 7–14)
APPEARANCE UR: CLEAR — SIGNIFICANT CHANGE UP
AST SERPL-CCNC: 19 U/L — SIGNIFICANT CHANGE UP (ref 0–41)
BACTERIA # UR AUTO: NEGATIVE /HPF — SIGNIFICANT CHANGE UP
BASOPHILS # BLD AUTO: 0.02 K/UL — SIGNIFICANT CHANGE UP (ref 0–0.2)
BASOPHILS NFR BLD AUTO: 0.5 % — SIGNIFICANT CHANGE UP (ref 0–1)
BILIRUB DIRECT SERPL-MCNC: <0.2 MG/DL — SIGNIFICANT CHANGE UP (ref 0–0.3)
BILIRUB INDIRECT FLD-MCNC: >0 MG/DL — LOW (ref 0.2–1.2)
BILIRUB SERPL-MCNC: 0.2 MG/DL — SIGNIFICANT CHANGE UP (ref 0.2–1.2)
BILIRUB UR-MCNC: NEGATIVE — SIGNIFICANT CHANGE UP
BUN SERPL-MCNC: 14 MG/DL — SIGNIFICANT CHANGE UP (ref 10–20)
CALCIUM SERPL-MCNC: 9.1 MG/DL — SIGNIFICANT CHANGE UP (ref 8.4–10.5)
CAST: 0 /LPF — SIGNIFICANT CHANGE UP (ref 0–4)
CHLORIDE SERPL-SCNC: 106 MMOL/L — SIGNIFICANT CHANGE UP (ref 98–110)
CO2 SERPL-SCNC: 28 MMOL/L — SIGNIFICANT CHANGE UP (ref 17–32)
COLOR SPEC: YELLOW — SIGNIFICANT CHANGE UP
CREAT SERPL-MCNC: 0.8 MG/DL — SIGNIFICANT CHANGE UP (ref 0.7–1.5)
DIFF PNL FLD: NEGATIVE — SIGNIFICANT CHANGE UP
EGFR: 80 ML/MIN/1.73M2 — SIGNIFICANT CHANGE UP
EOSINOPHIL # BLD AUTO: 0.12 K/UL — SIGNIFICANT CHANGE UP (ref 0–0.7)
EOSINOPHIL NFR BLD AUTO: 2.8 % — SIGNIFICANT CHANGE UP (ref 0–8)
GLUCOSE SERPL-MCNC: 84 MG/DL — SIGNIFICANT CHANGE UP (ref 70–99)
GLUCOSE UR QL: NEGATIVE MG/DL — SIGNIFICANT CHANGE UP
HCT VFR BLD CALC: 35.5 % — LOW (ref 37–47)
HGB BLD-MCNC: 10.6 G/DL — LOW (ref 12–16)
IMM GRANULOCYTES NFR BLD AUTO: 0.2 % — SIGNIFICANT CHANGE UP (ref 0.1–0.3)
KETONES UR-MCNC: NEGATIVE MG/DL — SIGNIFICANT CHANGE UP
LACTATE SERPL-SCNC: 0.8 MMOL/L — SIGNIFICANT CHANGE UP (ref 0.7–2)
LEUKOCYTE ESTERASE UR-ACNC: ABNORMAL
LIDOCAIN IGE QN: 49 U/L — SIGNIFICANT CHANGE UP (ref 7–60)
LYMPHOCYTES # BLD AUTO: 2.24 K/UL — SIGNIFICANT CHANGE UP (ref 1.2–3.4)
LYMPHOCYTES # BLD AUTO: 51.7 % — HIGH (ref 20.5–51.1)
MCHC RBC-ENTMCNC: 21.7 PG — LOW (ref 27–31)
MCHC RBC-ENTMCNC: 29.9 G/DL — LOW (ref 32–37)
MCV RBC AUTO: 72.7 FL — LOW (ref 81–99)
MONOCYTES # BLD AUTO: 0.51 K/UL — SIGNIFICANT CHANGE UP (ref 0.1–0.6)
MONOCYTES NFR BLD AUTO: 11.8 % — HIGH (ref 1.7–9.3)
NEUTROPHILS # BLD AUTO: 1.43 K/UL — SIGNIFICANT CHANGE UP (ref 1.4–6.5)
NEUTROPHILS NFR BLD AUTO: 33 % — LOW (ref 42.2–75.2)
NITRITE UR-MCNC: NEGATIVE — SIGNIFICANT CHANGE UP
NRBC # BLD: 0 /100 WBCS — SIGNIFICANT CHANGE UP (ref 0–0)
PH UR: 7 — SIGNIFICANT CHANGE UP (ref 5–8)
PLATELET # BLD AUTO: 293 K/UL — SIGNIFICANT CHANGE UP (ref 130–400)
PMV BLD: 11.7 FL — HIGH (ref 7.4–10.4)
POTASSIUM SERPL-MCNC: 4.8 MMOL/L — SIGNIFICANT CHANGE UP (ref 3.5–5)
POTASSIUM SERPL-SCNC: 4.8 MMOL/L — SIGNIFICANT CHANGE UP (ref 3.5–5)
PROT SERPL-MCNC: 6.5 G/DL — SIGNIFICANT CHANGE UP (ref 6–8)
PROT UR-MCNC: NEGATIVE MG/DL — SIGNIFICANT CHANGE UP
RBC # BLD: 4.88 M/UL — SIGNIFICANT CHANGE UP (ref 4.2–5.4)
RBC # FLD: 16.9 % — HIGH (ref 11.5–14.5)
RBC CASTS # UR COMP ASSIST: 1 /HPF — SIGNIFICANT CHANGE UP (ref 0–4)
SODIUM SERPL-SCNC: 141 MMOL/L — SIGNIFICANT CHANGE UP (ref 135–146)
SP GR SPEC: 1.01 — SIGNIFICANT CHANGE UP (ref 1–1.03)
SQUAMOUS # UR AUTO: 1 /HPF — SIGNIFICANT CHANGE UP (ref 0–5)
UROBILINOGEN FLD QL: 0.2 MG/DL — SIGNIFICANT CHANGE UP (ref 0.2–1)
WBC # BLD: 4.33 K/UL — LOW (ref 4.8–10.8)
WBC # FLD AUTO: 4.33 K/UL — LOW (ref 4.8–10.8)
WBC UR QL: 2 /HPF — SIGNIFICANT CHANGE UP (ref 0–5)

## 2023-08-30 PROCEDURE — 96374 THER/PROPH/DIAG INJ IV PUSH: CPT | Mod: XU

## 2023-08-30 PROCEDURE — 83690 ASSAY OF LIPASE: CPT

## 2023-08-30 PROCEDURE — 99284 EMERGENCY DEPT VISIT MOD MDM: CPT | Mod: 25

## 2023-08-30 PROCEDURE — 74177 CT ABD & PELVIS W/CONTRAST: CPT | Mod: MA

## 2023-08-30 PROCEDURE — 80076 HEPATIC FUNCTION PANEL: CPT

## 2023-08-30 PROCEDURE — 36415 COLL VENOUS BLD VENIPUNCTURE: CPT

## 2023-08-30 PROCEDURE — 83605 ASSAY OF LACTIC ACID: CPT

## 2023-08-30 PROCEDURE — 81001 URINALYSIS AUTO W/SCOPE: CPT

## 2023-08-30 PROCEDURE — 80048 BASIC METABOLIC PNL TOTAL CA: CPT

## 2023-08-30 PROCEDURE — 74177 CT ABD & PELVIS W/CONTRAST: CPT | Mod: 26,MA

## 2023-08-30 PROCEDURE — 85025 COMPLETE CBC W/AUTO DIFF WBC: CPT

## 2023-08-30 PROCEDURE — 99285 EMERGENCY DEPT VISIT HI MDM: CPT

## 2023-08-30 RX ORDER — FAMOTIDINE 10 MG/ML
20 INJECTION INTRAVENOUS ONCE
Refills: 0 | Status: COMPLETED | OUTPATIENT
Start: 2023-08-30 | End: 2023-08-30

## 2023-08-30 RX ORDER — SODIUM CHLORIDE 9 MG/ML
1000 INJECTION INTRAMUSCULAR; INTRAVENOUS; SUBCUTANEOUS ONCE
Refills: 0 | Status: COMPLETED | OUTPATIENT
Start: 2023-08-30 | End: 2023-08-30

## 2023-08-30 RX ADMIN — FAMOTIDINE 20 MILLIGRAM(S): 10 INJECTION INTRAVENOUS at 10:16

## 2023-08-30 RX ADMIN — SODIUM CHLORIDE 1000 MILLILITER(S): 9 INJECTION INTRAMUSCULAR; INTRAVENOUS; SUBCUTANEOUS at 10:16

## 2023-08-30 NOTE — ED PROVIDER NOTE - CLINICAL SUMMARY MEDICAL DECISION MAKING FREE TEXT BOX
.    69 y/o F pmh as noted, p/w  intermittent, diffuse abd pain x 2wks. No v/d urinary complaints, back pain. Pt is NAD; and soft, + mild diffuse ttp, no r/g. Considered colitis vs UTI vs other intrabd pathology. All available lab tests, imaging tests, and EKGs independently reviewed and interpreted by me. no acute severe pathology on testing. unclear etiology of sx, but pt safe for dc w/ cont oupt wup. Pt understands signs and symptoms for ED return.   .

## 2023-08-30 NOTE — ED PROVIDER NOTE - CONSIDERATION OF ADMISSION OBSERVATION
[FreeTextEntry1] : low salt diet and exercise\par f/u cpx in 3 months Consideration of Admission/Observation Given HPI, PMH, PE, w/up and ED course admission/ OBS not indicated.

## 2023-08-30 NOTE — ED ADULT NURSE NOTE - NSFALLUNIVINTERV_ED_ALL_ED
Bed/Stretcher in lowest position, wheels locked, appropriate side rails in place/Call bell, personal items and telephone in reach/Instruct patient to call for assistance before getting out of bed/chair/stretcher/Non-slip footwear applied when patient is off stretcher/[place] to call system/Physically safe environment - no spills, clutter or unnecessary equipment/Purposeful proactive rounding/Room/bathroom lighting operational, light cord in reach

## 2023-08-30 NOTE — ED PROVIDER NOTE - PHYSICAL EXAMINATION
Vital Signs: I have reviewed the initial vital signs.  Constitutional: NAD, well-nourished, appears stated age, no acute distress.  HEENT: Airway patent, moist MM, no erythema/swelling/deformity of oral structures. EOMI, PERRLA.  CV: regular rate, regular rhythm, well-perfused extremities, 2+ b/l DP and radial pulses equal.  Lungs: BCTA, no increased WOB.  ABD: +tenderness lower abdomen, ND, no guarding or rebound, no pulsatile mass, no hernias.   MSK: Neck supple, nontender, nl ROM, no stepoff. Chest nontender. Back nontender. Ext nontender, nl rom, no deformity.   INTEG: Skin warm, dry, no rash.  NEURO: A&Ox3, normal strength, nl sensation throughout, normal speech.   PSYCH: Calm, cooperative, normal affect and interaction.

## 2023-08-30 NOTE — ED PROVIDER NOTE - PATIENT PORTAL LINK FT
You can access the FollowMyHealth Patient Portal offered by [facility] by registering at the following website: [email or web address]. By joining [facility]’s FollowMyHealth portal, you will also be able to view your health information using other applications (apps) compatible with our system.

## 2023-08-30 NOTE — ED PROVIDER NOTE - OBJECTIVE STATEMENT
68-year-old female with history of high cholesterol, rectocele status post correction presents to the ED complaining of diffuse abdominal pain for 2 weeks.  Patient denies any nausea, vomiting, diarrhea, fever, chills or urinary symptoms.  Patient states last bowel movement was yesterday.

## 2023-08-30 NOTE — ED PROVIDER NOTE - IV ALTEPLASE INCLUSION HIDDEN
Interval History: NAEON. HV with 50, 115cc. TLSO in place. Pain control improving.     Medications:  Continuous Infusions:   sodium chloride 0.9% 50 mL/hr (12/10/20 0901)     Scheduled Meds:   bisacodyL  10 mg Rectal Daily    ceftaroline fosamil  600 mg Intravenous Q8H    donepeziL  10 mg Oral QHS    DULoxetine  60 mg Oral Daily    heparin (porcine)  5,000 Units Subcutaneous Q8H    memantine  10 mg Oral BID    methocarbamoL  500 mg Oral QID    mupirocin   Nasal BID    pantoprazole  40 mg Oral Before breakfast    pravastatin  40 mg Oral QHS    senna-docusate 8.6-50 mg  1 tablet Oral BID    vancomycin (VANCOCIN) IVPB  1,250 mg Intravenous Q24H     PRN Meds:acetaminophen, albuterol-ipratropium, aluminum-magnesium hydroxide-simethicone, dextrose 50%, dextrose 50%, glucagon (human recombinant), glucose, glucose, glucose, insulin aspart U-100, labetaloL, magnesium oxide, magnesium oxide, morphine, ondansetron, oxyCODONE, oxyCODONE, potassium bicarbonate, potassium bicarbonate, potassium bicarbonate, potassium, sodium phosphates, potassium, sodium phosphates, potassium, sodium phosphates, promethazine (PHENERGAN) IVPB, senna-docusate 8.6-50 mg, Pharmacy to dose Vancomycin consult **AND** vancomycin - pharmacy to dose     Review of Systems  Objective:     Weight: 125.4 kg (276 lb 7.3 oz)  Body mass index is 39.67 kg/m².  Vital Signs (Most Recent):  Temp: 98.8 °F (37.1 °C) (12/10/20 0700)  Pulse: 64 (12/10/20 0900)  Resp: (!) 21 (12/10/20 0900)  BP: 138/64 (12/10/20 0900)  SpO2: 99 % (12/10/20 0900) Vital Signs (24h Range):  Temp:  [98.2 °F (36.8 °C)-98.8 °F (37.1 °C)] 98.8 °F (37.1 °C)  Pulse:  [57-66] 64  Resp:  [10-35] 21  SpO2:  [89 %-100 %] 99 %  BP: ()/(40-65) 138/64  Arterial Line BP: ()/() 42/30     Date 12/10/20 0700 - 12/11/20 0659   Shift 1443-3800 4114-7507 4686-8537 24 Hour Total   INTAKE   I.V.(mL/kg) 146.8(1.2)   146.8(1.2)   Shift Total(mL/kg) 146.8(1.2)   146.8(1.2)   OUTPUT    Shift Total(mL/kg)       Weight (kg) 125.4 125.4 125.4 125.4                        Closed/Suction Drain 12/08/20 1239 Right Back Accordion 10 Fr. (Active)   Site Description Unable to view 12/10/20 0700   Dressing Type Biopatch in place 12/10/20 0700   Dressing Status Clean;Dry;Intact 12/10/20 0700   Dressing Intervention Integrity maintained 12/10/20 0700   Drainage Serosanguineous 12/10/20 0700   Status To bulb suction 12/10/20 0700   Output (mL) 35 mL 12/10/20 0605            Closed/Suction Drain 12/08/20 1239 Left Back Accordion 10 Fr. (Active)   Site Description Unable to view 12/10/20 0700   Dressing Type Biopatch in place 12/10/20 0700   Dressing Status Clean;Dry;Intact 12/10/20 0700   Dressing Intervention Integrity maintained 12/10/20 0700   Drainage Serosanguineous 12/10/20 0700   Status To bulb suction 12/10/20 0700   Output (mL) 50 mL 12/10/20 0605       Female External Urinary Catheter 12/09/20 1741 (Active)   Skin no redness;no breakdown 12/10/20 0700   Tolerance no signs/symptoms of discomfort 12/10/20 0700   Suction Continuous suction at 40 mmHg 12/10/20 0700   Date of last wick change 12/10/20 12/10/20 0700   Output (mL) 700 mL 12/10/20 0505       Neurosurgery Physical Exam   Awake, alert, NAD  Brace in place  EOMI, PERRL  FS in BUE  Pain limited proximal leg weakness 3+/5, L>R  4/5 at knee bilaterally  5/5 distally  SILT    Significant Labs:  Recent Labs   Lab 12/09/20  0406 12/09/20  1357 12/10/20  0302   * 92 109   * 136 138   K 3.9 4.1 3.9    105 109   CO2 25 26 24   BUN 10 9 9   CREATININE 0.9 0.8 0.8   CALCIUM 9.4 9.5 9.0   MG 1.7  --  1.9     Recent Labs   Lab 12/08/20  1216 12/09/20  0406 12/10/20  0302   WBC  --  11.37 10.31   HGB  --  8.9* 7.9*   HCT 30* 29.0* 26.9*   PLT  --  258 213     No results for input(s): LABPT, INR, APTT in the last 48 hours.  Microbiology Results (last 7 days)     Procedure Component Value Units Date/Time    Aerobic culture [[id number]]  Collected: 12/08/20 1139    Order Status: Completed Specimen: Back Updated: 12/09/20 0912     Aerobic Bacterial Culture No growth    Narrative:      T9-10 disc space    Aerobic culture [[id number]] Collected: 12/08/20 1139    Order Status: Completed Specimen: Back Updated: 12/09/20 0912     Aerobic Bacterial Culture No growth    Narrative:      T9-10 disc space    Culture, Anaerobe [[id number]] Collected: 12/08/20 1139    Order Status: Completed Specimen: Back Updated: 12/09/20 0730     Anaerobic Culture Culture in progress    Narrative:      T9-10 disc space    Culture, Anaerobe [[id number]] Collected: 12/08/20 1139    Order Status: Completed Specimen: Back Updated: 12/09/20 0730     Anaerobic Culture Culture in progress    Narrative:      T9-10 disc space    Gram stain [[id number]] Collected: 12/08/20 1139    Order Status: Completed Specimen: Back Updated: 12/08/20 1652     Gram Stain Result Rare WBC's      No organisms seen    Narrative:      T9-10 disc space    Gram stain [[id number]] Collected: 12/08/20 1139    Order Status: Completed Specimen: Back Updated: 12/08/20 1651     Gram Stain Result Rare WBC's      No organisms seen    Narrative:      T9-10 disc space    Fungus culture [[id number]] Collected: 12/08/20 1139    Order Status: Sent Specimen: Back Updated: 12/08/20 1220    Fungus culture [[id number]] Collected: 12/08/20 1139    Order Status: Sent Specimen: Back Updated: 12/08/20 1219    Blood culture [[id number]] Collected: 12/02/20 0020    Order Status: Completed Specimen: Blood Updated: 12/07/20 0612     Blood Culture, Routine No growth after 5 days.          Significant Diagnostics:  No results found in the last 24 hours.     show

## 2023-09-04 NOTE — ASU PREOP CHECKLIST - 1.
type and screen and confirmation drawn and sent to lab. Patient requests all Lab, Cardiology, and Radiology Results on their Discharge Instructions

## 2023-09-13 NOTE — ED ADULT NURSE NOTE - CAS DISCH TRANSFER METHOD
HPI:    Donna Greer is a 50 year old female who presents for a yearly physical examination.     Patient's medications, allergies, past medical, surgical, social and family histories were reviewed and updated as appropriate. She also is c/o of some abnormal uterine bleeding and some left adnexa discomfort. The initial history was taken in the room with patient on epic - upon reinspection of the chart, the history was deleted or did not save.     Screenings:   Menstrual cycles: abnormal   Last PAP: 10/18/16   Last Breast exam: 2023 - 8/17   Vision: no concerns  Hearing: no concerns   Mental health: no concerns     Problem List.  Patient Active Problem List   Diagnosis   (none) - all problems resolved or deleted       Surgical History:  Past Surgical History:   Procedure Laterality Date   • Cervical biopsy      Cone biopsy vs other surgical cervical dysplasia solution       Family History:  Family History     Relation Problem Comments    Daughter Patient is unaware of any medical problems        Daughter Patient is unaware of any medical problems        Father Cancer    Diabetes    Hypertension    Kidney disease        Maternal Grandfather Patient is unaware of any medical problems        Maternal Grandmother Heart disease        Mother Patient is unaware of any medical problems        Paternal Grandfather Patient is unaware of any medical problems        Paternal Grandmother Stroke        Sister Patient is unaware of any medical problems        Son Patient is unaware of any medical problems               Social History:  Work/Activites: not asked  Marital Status: not asked   Smoking: denies   Alcohol: denies  Drugs: denies  Exercise: no specific exercise program         ALLERGIES:   Allergen Reactions   • Sulfa Antibiotics SWELLING     Swelling with rash.       Current Outpatient Medications   Medication Sig Dispense Refill   • hydroquinone 4 % cream Apply topically 2 times daily. To face for ongoing treatment.  30 g 11   • FLUoxetine (PROzac) 20 MG capsule Take 1 capsule by mouth daily. 90 capsule 3     No current facility-administered medications for this visit.     Facility-Administered Medications Ordered in Other Visits   Medication Dose Route Frequency Provider Last Rate Last Admin   • lidocaine 1 % injection 100 mg  10 mL Subcutaneous Once Harlan Jones MD               Review of systems:   Review of Systems   Constitutional: Negative.  Negative for activity change, appetite change, fatigue, fever and unexpected weight change.   HENT: Negative.  Negative for congestion, ear discharge, ear pain, hearing loss, postnasal drip, rhinorrhea, tinnitus, trouble swallowing and voice change.    Eyes: Negative for photophobia, pain, redness and visual disturbance.   Respiratory: Negative.  Negative for cough, chest tightness and shortness of breath.    Cardiovascular: Negative.  Negative for chest pain, palpitations and leg swelling.   Gastrointestinal: Positive for abdominal pain. Negative for constipation, diarrhea, nausea and vomiting.   Endocrine: Negative for cold intolerance, heat intolerance, polydipsia, polyphagia and polyuria.   Genitourinary: Positive for menstrual problem. Negative for difficulty urinating, dysuria, frequency, hematuria, pelvic pain and urgency.   Musculoskeletal: Negative for arthralgias, back pain, myalgias, neck pain and neck stiffness.   Skin: Negative.  Negative for rash.   Neurological: Negative.  Negative for dizziness, speech difficulty, weakness, light-headedness and headaches.   Psychiatric/Behavioral: Negative.  Negative for confusion and sleep disturbance. The patient is not nervous/anxious.    All other systems reviewed and are negative.          Physical Examination:  Physical Exam  Vitals and nursing note reviewed.   Constitutional:       General: She is not in acute distress.     Appearance: Normal appearance. She is normal weight. She is not ill-appearing or toxic-appearing.       Comments: Pleasant. NAD. Nontoxic appearing. Normal vitals. Normal speech and insight.      HENT:      Head: Normocephalic and atraumatic.      Right Ear: Tympanic membrane, ear canal and external ear normal. There is no impacted cerumen.      Left Ear: Tympanic membrane, ear canal and external ear normal. There is no impacted cerumen.      Nose: Nose normal. No congestion.      Mouth/Throat:      Mouth: Mucous membranes are moist.      Pharynx: Oropharynx is clear.   Eyes:      Extraocular Movements: Extraocular movements intact.      Conjunctiva/sclera: Conjunctivae normal.      Pupils: Pupils are equal, round, and reactive to light.   Cardiovascular:      Rate and Rhythm: Normal rate and regular rhythm.      Pulses: Normal pulses.      Heart sounds: Normal heart sounds. No murmur heard.     No friction rub. No gallop.   Pulmonary:      Effort: Pulmonary effort is normal. No respiratory distress.      Breath sounds: Normal breath sounds. No stridor. No wheezing, rhonchi or rales.   Chest:      Chest wall: No tenderness.   Abdominal:      General: Abdomen is flat. Bowel sounds are normal. There is no distension.      Palpations: Abdomen is soft. There is no mass.      Tenderness: There is no abdominal tenderness. There is no right CVA tenderness, left CVA tenderness, guarding or rebound.      Hernia: No hernia is present.      Comments: No abdominal tenderness elicited. Normal bowel sounds. Negative Mcburney's point, hodge's sign, and rebound. No flank tenderness. No pulsatile abdominal mass.      Musculoskeletal:         General: No swelling or tenderness. Normal range of motion.      Cervical back: Normal range of motion and neck supple. No rigidity or tenderness.      Right lower leg: No edema.      Left lower leg: No edema.   Lymphadenopathy:      Cervical: No cervical adenopathy.   Skin:     General: Skin is warm and dry.      Capillary Refill: Capillary refill takes 2 to 3 seconds.      Findings: No erythema  or rash.   Neurological:      General: No focal deficit present.      Mental Status: She is alert. Mental status is at baseline.      Cranial Nerves: No cranial nerve deficit.      Motor: No weakness.   Psychiatric:         Mood and Affect: Mood normal.         Behavior: Behavior normal.         Thought Content: Thought content normal.         Judgment: Judgment normal.           Labs:   Office Visit on 09/13/2023   Component Date Value Ref Range Status   • Case Report 09/13/2023    Final                    Value:Gynecological Cytology                            Case: [id number]                                 Authorizing Provider:  Joanna Sandoval PA-C   Collected:           09/13/2023 1327              Ordering Location:     [facility]     Received:            09/13/2023 1432                                     [place] [address]                                                      First Screen:          Fidelia Pulido                                                                   Specimen:    Pap: ThinPrep GYN, Non Imaged, Cervix                                                     • Interpretation 09/13/2023 Negative for intraepithelial lesion or malignancy.    Final   • Specimen Adequacy 09/13/2023 Satisfactory for evaluation, endocervical/transformation zone component present.    Final   • Clinical Information 09/13/2023    Final                    Value:This result contains rich text formatting which cannot be displayed here.   • Pap Educational Note 09/13/2023    Final                    Value:This result contains rich text formatting which cannot be displayed here.   • High Risk HPV 09/13/2023 Negative  Negative Final   • Disclaimer 09/13/2023    Final                    Value:This result contains rich text formatting which cannot be displayed here.   • Chlamydia trachomatis by Nucleic A*  09/13/2023 Negative  Negative Final   • Disclaimer 09/13/2023    Final                    Value:This result contains rich text formatting which cannot be displayed here.   • Neisseria gonorrhoeae by Nucleic A* 09/13/2023 Negative  Negative Final   • Disclaimer 09/13/2023    Final                    Value:This result contains rich text formatting which cannot be displayed here.   • Trichomonas vaginalis by Nucleic A* 09/13/2023 Negative  Negative Final   Telephone on 08/15/2023   Component Date Value Ref Range Status   • COLOR, URINALYSIS 08/16/2023 Yellow   Final   • APPEARANCE, URINALYSIS 08/16/2023 Hazy   Final   • GLUCOSE, URINALYSIS 08/16/2023 Negative  Negative mg/dL Final   • BILIRUBIN, URINALYSIS 08/16/2023 Negative  Negative Final   • KETONES, URINALYSIS 08/16/2023 Negative  Negative mg/dL Final   • SPECIFIC GRAVITY, URINALYSIS 08/16/2023 1.013  1.005 - 1.030 Final    Measured by refractometry   • OCCULT BLOOD, URINALYSIS 08/16/2023 Small (A)  Negative Final   • PH, URINALYSIS 08/16/2023 6.0  5.0 - 7.0 Final   • PROTEIN, URINALYSIS 08/16/2023 30 (A)  Negative mg/dL Final   • UROBILINOGEN, URINALYSIS 08/16/2023 0.2  0.2, 1.0 mg/dL Final   • NITRITE, URINALYSIS 08/16/2023 Negative  Negative Final   • LEUKOCYTE ESTERASE, URINALYSIS 08/16/2023 Moderate (A)  Negative Final   • SQUAMOUS EPITHELIAL, URINALYSIS 08/16/2023 1 to 5  None Seen, 1 to 5 /hpf Final   • ERYTHROCYTES, URINALYSIS 08/16/2023 26 to 100 (A)  None Seen, 1 to 2 /hpf Final   • LEUKOCYTES, URINALYSIS 08/16/2023 >100 (A)  None Seen, 1 to 5 /hpf Final   • BACTERIA, URINALYSIS 08/16/2023 None Seen  None Seen /hpf Final   • HYALINE CASTS, URINALYSIS 08/16/2023 6 to 10 (A)  None Seen, 1 to 5 /lpf Final   • MUCUS 08/16/2023 Present   Final   • Urine, Bacterial Culture 08/16/2023 >100,000 CFU/mL Escherichia coli (A)   Final          Assessment and Plan:      1. Routine health screening  - Labs ordered - will call with results   - Screening Tests Reviewed -  pap ordered for today - pt just had mammogram  - Advised importance of maintaining a heart health diet and exercise regimen   - Follow up in 1 year for repeat physical     2. Pap exam  - Discussed the process of a pap and breast exam.  - Pt gave a verbal consent to proceed.  - Female medical tech in the room with patient verbal consent as a chaperone.   - External genitalia was inspected without any abnormalities seen.   - Speculum was used to further  Visualize the vaginal canal and cervix.  - A brush and spatula were used to scrap the cervix and endocervical region. No abnormalities of the cervix noted. No discharge or bleeding. Vaginal canal unremarkable.   - Samples were taken and placed into the proper container, which was also labeled and verified by the patient.   - STD panels were performed today with pap with patient consent.   - Bimanual examination was conducted with patient's verbal consent. No CMT. Ovaries were not palpable. No masses or nodules noted.   - Overall unremarkable genital examination.   - Pap performed and submitted. Patient tolerated procedure well.   - Proper sexual health practices were discussed.     - Breast exam deferred to mammogram.     3. Facial rash/discoloration  - will do one more trial of hydroquinone. Pt is aware of severe risks associated with this medication, as well as side effects. Advised not to use more than 3 months at one time  - Risks/benefits/Side effects given and patient gave a verbal understanding      Advised supportive care and follow-up recommendations.  All of the patient's questions were answered and they agreed to the care plan.   Advised on worsening signs and symptoms to watch for and reasons to return or go to the ER immediately.  Patient left the office in stable condition.    This note was generated using assisted voice technology. There may be errors in verbiage. Please contact me for further clarification on errors or misunderstandings.     Joanna  LAVINIA Sandoval, RITCHIE, MPH, [facility]        Follow-up:   No follow-ups on file.       Medications this visit:   Orders Placed This Encounter   • US PELVIS NON-OB EXTERNAL TRANSABDOMINAL AND INTERNAL TRANSVAGINAL   • HPV, High Risk   • HPV, High Risk   • Chlamydia trachomatis by Nucleic Acid Amplification   • Neisseria gonorrhoeae by Nucleic Acid Amplification   • Trichomonas vaginalis by Nucleic Acid Amplification   • SERVICE TO OB GYN   • Pap Test   • hydroquinone 4 % cream              Private car

## 2023-10-06 ENCOUNTER — OUTPATIENT (OUTPATIENT)
Dept: OUTPATIENT SERVICES | Facility: HOSPITAL | Age: 68
LOS: 1 days | End: 2023-10-06
Payer: MEDICAID

## 2023-10-06 ENCOUNTER — APPOINTMENT (OUTPATIENT)
Dept: INTERNAL MEDICINE | Facility: CLINIC | Age: 68
End: 2023-10-06
Payer: MEDICAID

## 2023-10-06 ENCOUNTER — MED ADMIN CHARGE (OUTPATIENT)
Age: 68
End: 2023-10-06

## 2023-10-06 VITALS
HEART RATE: 78 BPM | HEIGHT: 64 IN | WEIGHT: 202.25 LBS | DIASTOLIC BLOOD PRESSURE: 82 MMHG | BODY MASS INDEX: 34.53 KG/M2 | OXYGEN SATURATION: 97 % | SYSTOLIC BLOOD PRESSURE: 145 MMHG | TEMPERATURE: 97 F

## 2023-10-06 DIAGNOSIS — Z98.890 OTHER SPECIFIED POSTPROCEDURAL STATES: Chronic | ICD-10-CM

## 2023-10-06 DIAGNOSIS — Z00.00 ENCOUNTER FOR GENERAL ADULT MEDICAL EXAMINATION WITHOUT ABNORMAL FINDINGS: ICD-10-CM

## 2023-10-06 DIAGNOSIS — Z23 ENCOUNTER FOR IMMUNIZATION: ICD-10-CM

## 2023-10-06 DIAGNOSIS — R10.13 EPIGASTRIC PAIN: ICD-10-CM

## 2023-10-06 DIAGNOSIS — E78.5 HYPERLIPIDEMIA, UNSPECIFIED: ICD-10-CM

## 2023-10-06 DIAGNOSIS — I10 ESSENTIAL (PRIMARY) HYPERTENSION: ICD-10-CM

## 2023-10-06 DIAGNOSIS — K21.9 GASTRO-ESOPHAGEAL REFLUX DISEASE W/OUT ESOPHAGITIS: ICD-10-CM

## 2023-10-06 PROCEDURE — 99214 OFFICE O/P EST MOD 30 MIN: CPT | Mod: GC

## 2023-10-06 PROCEDURE — 90662 IIV NO PRSV INCREASED AG IM: CPT

## 2023-10-06 PROCEDURE — 99214 OFFICE O/P EST MOD 30 MIN: CPT

## 2023-10-06 RX ORDER — POLYETHYLENE GLYCOL 3350 AND ELECTROLYTES WITH LEMON FLAVOR 236; 22.74; 6.74; 5.86; 2.97 G/4L; G/4L; G/4L; G/4L; G/4L
236 POWDER, FOR SOLUTION ORAL
Qty: 1 | Refills: 0 | Status: ACTIVE | COMMUNITY
Start: 2023-06-21 | End: 1900-01-01

## 2023-10-06 RX ORDER — NAPROXEN SODIUM 500 MG/1
500 TABLET, FILM COATED, EXTENDED RELEASE ORAL
Qty: 30 | Refills: 0 | Status: DISCONTINUED | COMMUNITY
Start: 2023-06-20 | End: 2023-10-06

## 2023-10-06 RX ORDER — NAPROXEN 500 MG/1
500 TABLET ORAL
Qty: 60 | Refills: 5 | Status: DISCONTINUED | COMMUNITY
Start: 2023-06-20 | End: 2023-10-06

## 2023-10-07 ENCOUNTER — OUTPATIENT (OUTPATIENT)
Dept: OUTPATIENT SERVICES | Facility: HOSPITAL | Age: 68
LOS: 1 days | End: 2023-10-07

## 2023-10-07 DIAGNOSIS — Z98.890 OTHER SPECIFIED POSTPROCEDURAL STATES: Chronic | ICD-10-CM

## 2023-10-07 DIAGNOSIS — E78.5 HYPERLIPIDEMIA, UNSPECIFIED: ICD-10-CM

## 2023-10-07 LAB
25(OH)D3 SERPL-MCNC: 19 NG/ML
ALBUMIN SERPL ELPH-MCNC: 4 G/DL
ALP BLD-CCNC: 81 U/L
ALT SERPL-CCNC: 13 U/L
ANION GAP SERPL CALC-SCNC: 11 MMOL/L
AST SERPL-CCNC: 17 U/L
BILIRUB SERPL-MCNC: 0.3 MG/DL
BUN SERPL-MCNC: 15 MG/DL
CALCIUM SERPL-MCNC: 8.9 MG/DL
CHLORIDE SERPL-SCNC: 106 MMOL/L
CHOLEST SERPL-MCNC: 239 MG/DL
CO2 SERPL-SCNC: 25 MMOL/L
CREAT SERPL-MCNC: 0.9 MG/DL
EGFR: 69 ML/MIN/1.73M2
ESTIMATED AVERAGE GLUCOSE: 140 MG/DL
GLUCOSE SERPL-MCNC: 94 MG/DL
HBA1C MFR BLD HPLC: 6.5 %
HCT VFR BLD CALC: 36.4 %
HDLC SERPL-MCNC: 50 MG/DL
HGB BLD-MCNC: 10.6 G/DL
LDLC SERPL CALC-MCNC: 169 MG/DL
MCHC RBC-ENTMCNC: 21.9 PG
MCHC RBC-ENTMCNC: 29.1 G/DL
MCV RBC AUTO: 75.4 FL
NONHDLC SERPL-MCNC: 189 MG/DL
PLATELET # BLD AUTO: 286 K/UL
PMV BLD: 12 FL
POTASSIUM SERPL-SCNC: 4.7 MMOL/L
PROT SERPL-MCNC: 6.5 G/DL
RBC # BLD: 4.83 M/UL
RBC # FLD: 16.3 %
SODIUM SERPL-SCNC: 142 MMOL/L
TRIGL SERPL-MCNC: 98 MG/DL
TSH SERPL-ACNC: 1.12 UIU/ML
WBC # FLD AUTO: 4.51 K/UL

## 2023-10-08 DIAGNOSIS — E78.5 HYPERLIPIDEMIA, UNSPECIFIED: ICD-10-CM

## 2023-10-10 DIAGNOSIS — R10.13 EPIGASTRIC PAIN: ICD-10-CM

## 2023-10-10 DIAGNOSIS — K21.9 GASTRO-ESOPHAGEAL REFLUX DISEASE WITHOUT ESOPHAGITIS: ICD-10-CM

## 2023-10-10 DIAGNOSIS — I10 ESSENTIAL (PRIMARY) HYPERTENSION: ICD-10-CM

## 2023-10-10 DIAGNOSIS — E78.5 HYPERLIPIDEMIA, UNSPECIFIED: ICD-10-CM

## 2023-11-21 ENCOUNTER — APPOINTMENT (OUTPATIENT)
Dept: UROGYNECOLOGY | Facility: CLINIC | Age: 68
End: 2023-11-21

## 2023-12-17 NOTE — REVIEW OF SYSTEMS
[Recent Weight Loss (___ Lbs)] : recent [unfilled] ~Ulb weight loss [Abdominal Pain] : abdominal pain [Heartburn] : heartburn [Bloating (gassiness)] : bloating [Negative] : Cardiovascular [Fever] : no fever [Chills] : no chills [Chest Pain] : no chest pain [Palpitations] : no palpitations [facility] [Shortness Of Breath] : no shortness of breath [SOB on Exertion] : no shortness of breath during exertion [Vomiting] : no vomiting [Constipation] : no constipation [Diarrhea] : no diarrhea [Melena (black stool)] : no melena [Bleeding] : no bleeding [Fecal Incontinence (soiling)] : no fecal incontinence [facility]

## 2023-12-20 ENCOUNTER — APPOINTMENT (OUTPATIENT)
Dept: INTERNAL MEDICINE | Facility: CLINIC | Age: 68
End: 2023-12-20

## 2024-02-24 NOTE — END OF VISIT
Pharmacist Note - IP Vancomycin Dosing  Indication: Peritonitis (started vancomycin on 1/26/24 PTA)  Current regimen: IP Dosing by Level    Recent Labs     02/21/24  2012 02/23/24  0603 02/23/24  0811 02/23/24  1500 02/24/24  0538   WBC 7.5 6.4  --   --  6.5   CREATININE 5.43* 5.29* 5.24* 5.42* 5.04*   BUN 10 14 12 14 14     A vancomycin level of 19.6 mcg/mL was obtained ~4 days from last outpatient dose of IP vancomycin on 2/20/24    Goal target range Trough 10-15 mcg/mL      Plan: No Vancomycin in IP bag is due today. Pharmacy will continue to monitor this patient daily for changes in clinical status and renal function.   [>50% of Time Spent on Counseling for ____] : Greater than 50% of the encounter time was spent on counseling for [unfilled] [Time Spent: ___ minutes] : I have spent [unfilled] minutes of face to face time with the patient

## 2024-03-10 PROBLEM — N30.10 INTERSTITIAL CYSTITIS: Status: ACTIVE | Noted: 2020-01-29

## 2024-03-19 ENCOUNTER — OUTPATIENT (OUTPATIENT)
Dept: OUTPATIENT SERVICES | Facility: HOSPITAL | Age: 69
LOS: 1 days | End: 2024-03-19
Payer: COMMERCIAL

## 2024-03-19 DIAGNOSIS — Z98.890 OTHER SPECIFIED POSTPROCEDURAL STATES: Chronic | ICD-10-CM

## 2024-03-19 DIAGNOSIS — K02.9 DENTAL CARIES, UNSPECIFIED: ICD-10-CM

## 2024-03-19 PROCEDURE — D0140: CPT

## 2024-03-19 PROCEDURE — D0220: CPT

## 2024-03-21 DIAGNOSIS — K05.6 PERIODONTAL DISEASE, UNSPECIFIED: ICD-10-CM

## 2024-05-01 NOTE — ED ADULT NURSE NOTE - CAS DISCH CONDITION
Multilayer Compression Wrap   Below the Knee    NAME:  Trevin Gupta  YOB: 1954  MEDICAL RECORD NUMBER:  [id number]  DATE:  5/1/2024    Multilayer compression wrap: Removed old Multilayer wrap if indicated and wash leg with mild soap/water.  Applied moisturizing agent to dry skin as needed.   Applied primary and secondary dressing as ordered.  Applied multilayered dressing below the knee to right lower leg.  Applied multilayered dressing below the knee to left lower leg.  Instructed patient/caregiver not to remove dressing and to keep it clean and dry.   Instructed patient/caregiver on complications to report to provider, such as pain, numbness in toes, heavy drainage, and slippage of dressing.  Instructed patient on purpose of compression dressing and on activity and exercise recommendations.     Applied  2 layer wrap from toes to knee overlapping each time  Betaine, cutimed sorbact swab,  Electronically signed by ALEE GUPTA RN on 5/1/2024 at 10:45 AM         ARE UNABLE TO OBTAIN WOUND SUPPLIES, CONTINUE TO USE THE SUPPLIES YOU HAVE AVAILABLE UNTIL YOU ARE ABLE TO REACH US. IT IS MOST IMPORTANT TO KEEP THE WOUND COVERED AT ALL TIMES.    Patient Experience    Thank you for trusting us with your care.  You may receive a survey from a company called [employer] asking for your feedback.  We would appreciate it if you took a few minutes to share your experience.  Your input is very valuable to us.           Dwain Green MD, FACS  5/1/2024  3:04 PM         Stable

## 2024-05-10 ENCOUNTER — OUTPATIENT (OUTPATIENT)
Dept: OUTPATIENT SERVICES | Facility: HOSPITAL | Age: 69
LOS: 1 days | End: 2024-05-10
Payer: MEDICAID

## 2024-05-10 ENCOUNTER — APPOINTMENT (OUTPATIENT)
Dept: INTERNAL MEDICINE | Facility: CLINIC | Age: 69
End: 2024-05-10
Payer: MEDICAID

## 2024-05-10 VITALS
HEIGHT: 64 IN | TEMPERATURE: 98 F | WEIGHT: 200 LBS | SYSTOLIC BLOOD PRESSURE: 150 MMHG | BODY MASS INDEX: 34.15 KG/M2 | OXYGEN SATURATION: 98 % | DIASTOLIC BLOOD PRESSURE: 78 MMHG | HEART RATE: 88 BPM

## 2024-05-10 VITALS — SYSTOLIC BLOOD PRESSURE: 159 MMHG | DIASTOLIC BLOOD PRESSURE: 72 MMHG

## 2024-05-10 DIAGNOSIS — E11.9 TYPE 2 DIABETES MELLITUS W/OUT COMPLICATIONS: ICD-10-CM

## 2024-05-10 DIAGNOSIS — Z00.00 ENCOUNTER FOR GENERAL ADULT MEDICAL EXAMINATION W/OUT ABNORMAL FINDINGS: ICD-10-CM

## 2024-05-10 DIAGNOSIS — Z00.00 ENCOUNTER FOR GENERAL ADULT MEDICAL EXAMINATION WITHOUT ABNORMAL FINDINGS: ICD-10-CM

## 2024-05-10 DIAGNOSIS — Z98.890 OTHER SPECIFIED POSTPROCEDURAL STATES: Chronic | ICD-10-CM

## 2024-05-10 LAB
ALBUMIN SERPL ELPH-MCNC: 4.4 G/DL
ALP BLD-CCNC: 77 U/L
ALT SERPL-CCNC: 15 U/L
ANION GAP SERPL CALC-SCNC: 10 MMOL/L
AST SERPL-CCNC: 28 U/L
BILIRUB SERPL-MCNC: 0.2 MG/DL
BUN SERPL-MCNC: 13 MG/DL
CALCIUM SERPL-MCNC: 9.4 MG/DL
CHLORIDE SERPL-SCNC: 106 MMOL/L
CHOLEST SERPL-MCNC: 227 MG/DL
CO2 SERPL-SCNC: 26 MMOL/L
CREAT SERPL-MCNC: 0.8 MG/DL
EGFR: 79 ML/MIN/1.73M2
ESTIMATED AVERAGE GLUCOSE: 131 MG/DL
GLUCOSE SERPL-MCNC: 102 MG/DL
HBA1C MFR BLD HPLC: 6.2 %
HCT VFR BLD CALC: 36.7 %
HDLC SERPL-MCNC: 49 MG/DL
HGB BLD-MCNC: 11 G/DL
LDLC SERPL CALC-MCNC: 161 MG/DL
MCHC RBC-ENTMCNC: 22.3 PG
MCHC RBC-ENTMCNC: 30 G/DL
MCV RBC AUTO: 74.4 FL
NONHDLC SERPL-MCNC: 178 MG/DL
PLATELET # BLD AUTO: 277 K/UL
PMV BLD AUTO: 0 /100 WBCS
PMV BLD: 13.3 FL
POTASSIUM SERPL-SCNC: 4.5 MMOL/L
PROT SERPL-MCNC: 6.9 G/DL
RBC # BLD: 4.93 M/UL
RBC # FLD: 16.9 %
SODIUM SERPL-SCNC: 142 MMOL/L
TRIGL SERPL-MCNC: 83 MG/DL
TSH SERPL-ACNC: 0.94 UIU/ML
WBC # FLD AUTO: 4.89 K/UL

## 2024-05-10 PROCEDURE — 82306 VITAMIN D 25 HYDROXY: CPT

## 2024-05-10 PROCEDURE — 99214 OFFICE O/P EST MOD 30 MIN: CPT

## 2024-05-10 PROCEDURE — 80061 LIPID PANEL: CPT

## 2024-05-10 PROCEDURE — 82607 VITAMIN B-12: CPT

## 2024-05-10 PROCEDURE — 83036 HEMOGLOBIN GLYCOSYLATED A1C: CPT

## 2024-05-10 PROCEDURE — 82746 ASSAY OF FOLIC ACID SERUM: CPT

## 2024-05-10 PROCEDURE — 84443 ASSAY THYROID STIM HORMONE: CPT

## 2024-05-10 PROCEDURE — 85027 COMPLETE CBC AUTOMATED: CPT

## 2024-05-10 PROCEDURE — 80053 COMPREHEN METABOLIC PANEL: CPT

## 2024-05-10 RX ORDER — SITAGLIPTIN 50 MG/1
50 TABLET, FILM COATED ORAL DAILY
Qty: 30 | Refills: 5 | Status: ACTIVE | COMMUNITY
Start: 2024-05-10 | End: 1900-01-01

## 2024-05-10 RX ORDER — ATORVASTATIN CALCIUM 40 MG/1
40 TABLET, FILM COATED ORAL DAILY
Qty: 90 | Refills: 3 | Status: ACTIVE | COMMUNITY
Start: 2023-06-20 | End: 1900-01-01

## 2024-05-10 RX ORDER — GABAPENTIN 300 MG/1
300 CAPSULE ORAL 3 TIMES DAILY
Qty: 90 | Refills: 3 | Status: ACTIVE | COMMUNITY
Start: 2022-05-18 | End: 1900-01-01

## 2024-05-10 RX ORDER — PANTOPRAZOLE 40 MG/1
40 TABLET, DELAYED RELEASE ORAL DAILY
Qty: 90 | Refills: 0 | Status: ACTIVE | COMMUNITY
Start: 2023-03-17 | End: 1900-01-01

## 2024-05-10 RX ORDER — POLYETHYLENE GLYCOL 3350 17 G/17G
17 POWDER, FOR SOLUTION ORAL DAILY
Qty: 30 | Refills: 2 | Status: ACTIVE | COMMUNITY
Start: 2024-05-10 | End: 1900-01-01

## 2024-05-10 RX ORDER — DICYCLOMINE HYDROCHLORIDE 20 MG/1
20 TABLET ORAL EVERY 6 HOURS
Qty: 120 | Refills: 0 | Status: ACTIVE | COMMUNITY
Start: 2023-10-06 | End: 1900-01-01

## 2024-05-10 RX ORDER — LOSARTAN POTASSIUM 50 MG/1
50 TABLET, FILM COATED ORAL DAILY
Qty: 90 | Refills: 3 | Status: ACTIVE | COMMUNITY
Start: 2023-03-17 | End: 1900-01-01

## 2024-05-10 RX ORDER — CYCLOBENZAPRINE HYDROCHLORIDE 10 MG/1
10 TABLET, FILM COATED ORAL TWICE DAILY
Qty: 60 | Refills: 5 | Status: ACTIVE | COMMUNITY
Start: 2018-08-03 | End: 1900-01-01

## 2024-05-10 RX ORDER — SENNOSIDES 8.6 MG TABLETS 8.6 MG/1
8.6 TABLET ORAL AT BEDTIME
Qty: 60 | Refills: 2 | Status: ACTIVE | COMMUNITY
Start: 2024-05-10 | End: 1900-01-01

## 2024-05-10 NOTE — ASSESSMENT
[FreeTextEntry1] : 69 yo F, PMH of spinal stenosis, rectocele, hypertension, hyperlipidemia, prediabetes, left shoulder pain here for follow-up visit.   #Epigastric pain/Abd pain #Chronic GERD #Microcytic Anemia #H.Pylori on biopsy in 2015 - s/p GI eval, plan for EGD  - c/w PPI - bentyl prn  #Non-bleeding AVM's - Colonoscopy in 6/2015: non-bleeding avm's - Repeat Colonoscopy planned by GI  #memory loss - refer to neurology  #rectocele s/p surgery 1/22, # destrusor dysfunction, #vaginal prolapse -follows with uro/gyn -continue with cyglobenzaprine 10mg BID - persistent symptoms, follow up with gyn   #back pain 2/2 spinal stenosis -continue naproxen -c/w gabapentin 300x3  #dyslipidemia -labs reviewed, poorly controlled - no new labs - advised on low calorie, low fat, low salt, low soda diet, avoidance of smoking/alcohol use and exercise as tolerated -c/w atorvastatin 40mg QD  #DM -  hba1c 6.5% (2023) - no new labs - start januvia - avoided metformin due to abdominal pain/discomfort - repeat labs  #HTN - BP 140s today, didnt take medication this am - advised on low calorie, low fat, low salt, low soda diet, avoidance of smoking/alcohol use and exercise as tolerated -c/w losartan 50mg QD  #HCM - mammogram 7/23 birads 1 - Seen by GI 6/2023: recs repeat colonoscopy and EGD - follow up in 6 months or prn with labs

## 2024-05-10 NOTE — REVIEW OF SYSTEMS
[Abdominal Pain] : abdominal pain [Fever] : no fever [Chills] : no chills [Discharge] : no discharge [Vision Problems] : no vision problems [Earache] : no earache [Chest Pain] : no chest pain [Orthopnea] : no orthopnea [Shortness Of Breath] : no shortness of breath [Nausea] : no nausea [Constipation] : no constipation [Diarrhea] : diarrhea [Vomiting] : no vomiting [Dysuria] : no dysuria [Joint Pain] : no joint pain [Itching] : no itching [Headache] : no headache [Easy Bleeding] : no easy bleeding

## 2024-05-10 NOTE — HISTORY OF PRESENT ILLNESS
[FreeTextEntry1] : follow up [de-identified] : 67 yo F, PMH of spinal stenosis, rectocele, hypertension, hyperlipidemia, prediabetes, left shoulder pain here for follow-up visit. No new complaints at this visit.

## 2024-05-10 NOTE — PHYSICAL EXAM
[No Acute Distress] : no acute distress [Well Nourished] : well nourished [Normal Sclera/Conjunctiva] : normal sclera/conjunctiva [PERRL] : pupils equal round and reactive to light [Normal Outer Ear/Nose] : the outer ears and nose were normal in appearance [No JVD] : no jugular venous distention [No Respiratory Distress] : no respiratory distress  [Normal Rate] : normal rate  [Normal S1, S2] : normal S1 and S2 [No Edema] : there was no peripheral edema [Soft] : abdomen soft [No CVA Tenderness] : no CVA  tenderness [No Joint Swelling] : no joint swelling [No Rash] : no rash [No Focal Deficits] : no focal deficits

## 2024-05-11 LAB
25(OH)D3 SERPL-MCNC: 36 NG/ML
FOLATE SERPL-MCNC: 12.9 NG/ML
VIT B12 SERPL-MCNC: 1130 PG/ML

## 2024-05-14 DIAGNOSIS — E11.9 TYPE 2 DIABETES MELLITUS WITHOUT COMPLICATIONS: ICD-10-CM

## 2024-05-14 DIAGNOSIS — R10.9 UNSPECIFIED ABDOMINAL PAIN: ICD-10-CM

## 2024-05-14 DIAGNOSIS — Z00.00 ENCOUNTER FOR GENERAL ADULT MEDICAL EXAMINATION WITHOUT ABNORMAL FINDINGS: ICD-10-CM

## 2024-06-17 ENCOUNTER — OUTPATIENT (OUTPATIENT)
Dept: OUTPATIENT SERVICES | Facility: HOSPITAL | Age: 69
LOS: 1 days | End: 2024-06-17
Payer: MEDICAID

## 2024-06-17 ENCOUNTER — APPOINTMENT (OUTPATIENT)
Dept: UROLOGY | Facility: CLINIC | Age: 69
End: 2024-06-17
Payer: MEDICAID

## 2024-06-17 VITALS
WEIGHT: 205 LBS | TEMPERATURE: 97.4 F | OXYGEN SATURATION: 98 % | HEART RATE: 87 BPM | BODY MASS INDEX: 35 KG/M2 | SYSTOLIC BLOOD PRESSURE: 133 MMHG | DIASTOLIC BLOOD PRESSURE: 82 MMHG | HEIGHT: 64 IN

## 2024-06-17 DIAGNOSIS — Z98.890 OTHER SPECIFIED POSTPROCEDURAL STATES: Chronic | ICD-10-CM

## 2024-06-17 DIAGNOSIS — R10.9 UNSPECIFIED ABDOMINAL PAIN: ICD-10-CM

## 2024-06-17 DIAGNOSIS — R30.0 DYSURIA: ICD-10-CM

## 2024-06-17 DIAGNOSIS — Z00.00 ENCOUNTER FOR GENERAL ADULT MEDICAL EXAMINATION WITHOUT ABNORMAL FINDINGS: ICD-10-CM

## 2024-06-17 PROCEDURE — 99204 OFFICE O/P NEW MOD 45 MIN: CPT

## 2024-06-17 PROCEDURE — 99214 OFFICE O/P EST MOD 30 MIN: CPT

## 2024-06-17 RX ORDER — SULFAMETHOXAZOLE AND TRIMETHOPRIM 800; 160 MG/1; MG/1
800-160 TABLET ORAL TWICE DAILY
Qty: 6 | Refills: 0 | Status: ACTIVE | COMMUNITY
Start: 2024-06-17 | End: 1900-01-01

## 2024-06-17 NOTE — PHYSICAL EXAM
[Costovertebral Angle Tenderness] : no ~M costovertebral angle tenderness [de-identified] : bilaterally

## 2024-06-17 NOTE — PLAN

## 2024-06-17 NOTE — HISTORY OF PRESENT ILLNESS
[FreeTextEntry1] : 69F S/p posterior colporrhaphy/perrineorhaphy 1/4/2022, has extensive history with urogynecology for her lower urinary tract symptoms.   7/21/21: urodynamics: sensitive bladder, no USUI, +obstructive voiding Patient is on Gabapentin 300 mg TID by neurology May 2022 ? not sure if she's still taking? S/p Elavil or Elmiron s/p Flexeril 5 mg BID, no improvement Flexeril 10 mg BID estrace vaginal valium suppositories.

## 2024-06-17 NOTE — ASSESSMENT
[FreeTextEntry1] : #Dyuria/Abdominal pain - UA/UCx - RBUS to r/o hydro/stones/assess PVR - Empiric treatment with Bactrim - Pt will follow up with Urogynecology for further work up and care

## 2024-06-24 ENCOUNTER — LABORATORY RESULT (OUTPATIENT)
Age: 69
End: 2024-06-24

## 2024-06-26 DIAGNOSIS — R10.9 UNSPECIFIED ABDOMINAL PAIN: ICD-10-CM

## 2024-06-26 DIAGNOSIS — R30.0 DYSURIA: ICD-10-CM

## 2024-06-26 LAB — BACTERIA UR CULT: NORMAL

## 2024-07-10 ENCOUNTER — OUTPATIENT (OUTPATIENT)
Dept: OUTPATIENT SERVICES | Facility: HOSPITAL | Age: 69
LOS: 1 days | End: 2024-07-10
Payer: MEDICAID

## 2024-07-10 ENCOUNTER — APPOINTMENT (OUTPATIENT)
Dept: GASTROENTEROLOGY | Facility: CLINIC | Age: 69
End: 2024-07-10
Payer: MEDICAID

## 2024-07-10 ENCOUNTER — NON-APPOINTMENT (OUTPATIENT)
Age: 69
End: 2024-07-10

## 2024-07-10 VITALS
TEMPERATURE: 97.2 F | SYSTOLIC BLOOD PRESSURE: 131 MMHG | BODY MASS INDEX: 34.66 KG/M2 | OXYGEN SATURATION: 100 % | DIASTOLIC BLOOD PRESSURE: 85 MMHG | HEART RATE: 74 BPM | WEIGHT: 203 LBS | HEIGHT: 64 IN

## 2024-07-10 DIAGNOSIS — R10.9 UNSPECIFIED ABDOMINAL PAIN: ICD-10-CM

## 2024-07-10 DIAGNOSIS — K21.9 GASTRO-ESOPHAGEAL REFLUX DISEASE W/OUT ESOPHAGITIS: ICD-10-CM

## 2024-07-10 DIAGNOSIS — Z98.890 OTHER SPECIFIED POSTPROCEDURAL STATES: Chronic | ICD-10-CM

## 2024-07-10 DIAGNOSIS — Z12.11 ENCOUNTER FOR SCREENING FOR MALIGNANT NEOPLASM OF COLON: ICD-10-CM

## 2024-07-10 DIAGNOSIS — Z00.00 ENCOUNTER FOR GENERAL ADULT MEDICAL EXAMINATION WITHOUT ABNORMAL FINDINGS: ICD-10-CM

## 2024-07-10 DIAGNOSIS — D64.9 ANEMIA, UNSPECIFIED: ICD-10-CM

## 2024-07-10 PROCEDURE — 99204 OFFICE O/P NEW MOD 45 MIN: CPT

## 2024-07-10 PROCEDURE — 99214 OFFICE O/P EST MOD 30 MIN: CPT

## 2024-07-10 RX ORDER — POLYETHYLENE GLYCOL 3350 AND ELECTROLYTES WITH LEMON FLAVOR 236; 22.74; 6.74; 5.86; 2.97 G/4L; G/4L; G/4L; G/4L; G/4L
236 POWDER, FOR SOLUTION ORAL
Qty: 2 | Refills: 0 | Status: ACTIVE | COMMUNITY
Start: 2024-07-10 | End: 1900-01-01

## 2024-07-17 DIAGNOSIS — Z12.11 ENCOUNTER FOR SCREENING FOR MALIGNANT NEOPLASM OF COLON: ICD-10-CM

## 2024-07-17 DIAGNOSIS — D64.9 ANEMIA, UNSPECIFIED: ICD-10-CM

## 2024-07-17 DIAGNOSIS — K21.9 GASTRO-ESOPHAGEAL REFLUX DISEASE WITHOUT ESOPHAGITIS: ICD-10-CM

## 2024-07-17 DIAGNOSIS — R10.9 UNSPECIFIED ABDOMINAL PAIN: ICD-10-CM

## 2024-08-13 ENCOUNTER — APPOINTMENT (OUTPATIENT)
Dept: NEUROLOGY | Facility: CLINIC | Age: 69
End: 2024-08-13
Payer: MEDICAID

## 2024-08-13 VITALS
OXYGEN SATURATION: 96 % | DIASTOLIC BLOOD PRESSURE: 94 MMHG | HEIGHT: 64 IN | WEIGHT: 203 LBS | SYSTOLIC BLOOD PRESSURE: 164 MMHG | HEART RATE: 81 BPM | BODY MASS INDEX: 34.66 KG/M2

## 2024-08-13 DIAGNOSIS — F03.90 UNSPECIFIED DEMENTIA W/OUT BEHAVIORAL DISTURBANCE: ICD-10-CM

## 2024-08-13 PROCEDURE — 99204 OFFICE O/P NEW MOD 45 MIN: CPT

## 2024-08-13 NOTE — ASSESSMENT
[FreeTextEntry1] : 69y righthanded female nonsmoker PMH spinal stenosis with spinal neurostimulator, HTN HLD pre-diabetes, L shoulder pain referred by PCP for progressive memory loss over 2 years in all aspects of life: short term memory, executive functioning, ADL (mainly cooking or leaving stove on), spatial (got lost on familiar path). Low suspicion for pseudodementia as her moments of sadness are related to having forgotten and being reminded of family in [facility] who , and not related to other topics. Patient was not able to understand any of the MOCA  #Dementia  - MR Brain/Dementia protocol and MRA noncon - Start donepezil 5mg qd - B1 RPR HIV - Referral for neuropsychology and cognitive neuro - Social work referral - If no response to donepezil may consider starting sertraline if depression is a larger issue than brother is aware. - Mgmt of cardiovascular risk factors with PCP - RTC 3 months

## 2024-08-13 NOTE — END OF VISIT
[] : Resident [FreeTextEntry3] : 69-year-old woman with hypercholesterolemia and hypertension presenting with 2 years cognitive decline in the domains of attention, memory, visuospatial and executive function.  B12 and TSH normal.  Plan includes MRI brain with MRA, RPR HIV B1 to complete reversible dementia labs.  Start donepezil 5 mg risks and benefits discussed.  Social work referral.  Referral to cognitive neurology and neuropsych

## 2024-08-13 NOTE — HISTORY OF PRESENT ILLNESS
[FreeTextEntry1] : 69y righthanded female nonsmoker PMH spinal stenosis with spinal neurostimulator, HTN HLD pre-diabetes, L shoulder pain referred by PCP for memory loss.  Spoke to brother Kvng on the phone. Always loses keys. When they call her she forgets conversations she has had in the past. Started 1 year ago much progressive this year. Once she got lost earlier this year, [facility]. He denied hygeine issues. Sometimes she does her own cooking, or family helps her because she has left food on the stove before. He thinks she has some depression related to family members who  back home [facility] (english, and [place] dialect) but only in this context and not about other topics.  [phone] Brother Kvng  , on atorva 40.  ATC 6.5 last year   B12 1130. Folate 12.9. TSH 0.41. Vit D low-end of normal 30.

## 2024-08-13 NOTE — PHYSICAL EXAM
[FreeTextEntry1] : GEN: NAD. Well dressed female. +obesity. +psychomotor slowing and abulia. +glabellar reflex MS: [facility]. When asked month date or year says "does not know" Can name pen.  CN:  II: Visual fields are full to confrontation; Pupils are equal, round, and reactive to light;  III, IV, VI: Extraocular movements are intact without nystagmus. V: Facial sensation is intact in the V1-V3 distribution bilaterally. VII: Face is symmetric with normal eye closure and smile VIII: Hearing is intact to finger rub. IX, X: Uvula is midline and soft palate rises symmetrically XI: Head turning and shoulder shrug are intact. XII: Tongue protrudes in the midline. MOTOR: 5/5 UE 5/5 LE. Tone is normal.  REFL: 1+ biceps 1+ patellar. downgoing toes SENS: Intact to LT COORD: No dysmetria. No tremor at rest.  GAIT: wide based,.

## 2024-08-19 ENCOUNTER — NON-APPOINTMENT (OUTPATIENT)
Age: 69
End: 2024-08-19

## 2024-11-11 ENCOUNTER — OUTPATIENT (OUTPATIENT)
Dept: OUTPATIENT SERVICES | Facility: HOSPITAL | Age: 69
LOS: 1 days | End: 2024-11-11
Payer: MEDICAID

## 2024-11-11 ENCOUNTER — APPOINTMENT (OUTPATIENT)
Dept: INTERNAL MEDICINE | Facility: CLINIC | Age: 69
End: 2024-11-11
Payer: MEDICAID

## 2024-11-11 VITALS
SYSTOLIC BLOOD PRESSURE: 167 MMHG | HEART RATE: 58 BPM | TEMPERATURE: 97.9 F | BODY MASS INDEX: 33.63 KG/M2 | DIASTOLIC BLOOD PRESSURE: 73 MMHG | WEIGHT: 197 LBS | HEIGHT: 64 IN | OXYGEN SATURATION: 98 %

## 2024-11-11 VITALS — DIASTOLIC BLOOD PRESSURE: 80 MMHG | SYSTOLIC BLOOD PRESSURE: 156 MMHG

## 2024-11-11 DIAGNOSIS — E78.5 HYPERLIPIDEMIA, UNSPECIFIED: ICD-10-CM

## 2024-11-11 DIAGNOSIS — D64.9 ANEMIA, UNSPECIFIED: ICD-10-CM

## 2024-11-11 DIAGNOSIS — K21.9 GASTRO-ESOPHAGEAL REFLUX DISEASE W/OUT ESOPHAGITIS: ICD-10-CM

## 2024-11-11 DIAGNOSIS — E11.9 TYPE 2 DIABETES MELLITUS W/OUT COMPLICATIONS: ICD-10-CM

## 2024-11-11 DIAGNOSIS — Z00.00 ENCOUNTER FOR GENERAL ADULT MEDICAL EXAMINATION WITHOUT ABNORMAL FINDINGS: ICD-10-CM

## 2024-11-11 DIAGNOSIS — Z00.00 ENCOUNTER FOR GENERAL ADULT MEDICAL EXAMINATION W/OUT ABNORMAL FINDINGS: ICD-10-CM

## 2024-11-11 DIAGNOSIS — Z98.890 OTHER SPECIFIED POSTPROCEDURAL STATES: Chronic | ICD-10-CM

## 2024-11-11 LAB
BASOPHILS # BLD AUTO: 0.02 K/UL
BASOPHILS NFR BLD AUTO: 0.5 %
EOSINOPHIL # BLD AUTO: 0.1 K/UL
EOSINOPHIL NFR BLD AUTO: 2.5 %
ESTIMATED AVERAGE GLUCOSE: 143 MG/DL
HBA1C MFR BLD HPLC: 6.6 %
HCT VFR BLD CALC: 36.2 %
HGB BLD-MCNC: 10.7 G/DL
IMM GRANULOCYTES NFR BLD AUTO: 0 %
LYMPHOCYTES # BLD AUTO: 1.77 K/UL
LYMPHOCYTES NFR BLD AUTO: 44.4 %
MAN DIFF?: NORMAL
MCHC RBC-ENTMCNC: 22.1 PG
MCHC RBC-ENTMCNC: 29.6 G/DL
MCV RBC AUTO: 74.6 FL
MONOCYTES # BLD AUTO: 0.37 K/UL
MONOCYTES NFR BLD AUTO: 9.3 %
NEUTROPHILS # BLD AUTO: 1.73 K/UL
NEUTROPHILS NFR BLD AUTO: 43.3 %
PLATELET # BLD AUTO: 275 K/UL
PMV BLD AUTO: 0 /100 WBCS
RBC # BLD: 4.85 M/UL
RBC # FLD: 16.6 %
WBC # FLD AUTO: 3.99 K/UL

## 2024-11-11 PROCEDURE — 85027 COMPLETE CBC AUTOMATED: CPT

## 2024-11-11 PROCEDURE — 83036 HEMOGLOBIN GLYCOSYLATED A1C: CPT

## 2024-11-11 PROCEDURE — 99214 OFFICE O/P EST MOD 30 MIN: CPT

## 2024-11-11 PROCEDURE — 80053 COMPREHEN METABOLIC PANEL: CPT

## 2024-11-11 PROCEDURE — 80061 LIPID PANEL: CPT

## 2024-11-11 PROCEDURE — 84443 ASSAY THYROID STIM HORMONE: CPT

## 2024-11-12 LAB
ALBUMIN SERPL ELPH-MCNC: 4.5 G/DL
ALP BLD-CCNC: 100 U/L
ALT SERPL-CCNC: 19 U/L
AST SERPL-CCNC: 27 U/L
BILIRUB SERPL-MCNC: 0.4 MG/DL
BUN SERPL-MCNC: 11 MG/DL
CALCIUM SERPL-MCNC: 9.8 MG/DL
CHLORIDE SERPL-SCNC: 102 MMOL/L
CHOLEST SERPL-MCNC: 213 MG/DL
CO2 SERPL-SCNC: 23 MMOL/L
CREAT SERPL-MCNC: 0.7 MG/DL
EGFR: 93 ML/MIN/1.73M2
GLUCOSE SERPL-MCNC: 93 MG/DL
HDLC SERPL-MCNC: 44 MG/DL
LDLC SERPL CALC-MCNC: 155 MG/DL
NONHDLC SERPL-MCNC: 169 MG/DL
POTASSIUM SERPL-SCNC: 4.7 MMOL/L
PROT SERPL-MCNC: 7 G/DL
SODIUM SERPL-SCNC: 140 MMOL/L
TRIGL SERPL-MCNC: 70 MG/DL
TSH SERPL-ACNC: 1.24 UIU/ML

## 2024-11-17 ENCOUNTER — OUTPATIENT (OUTPATIENT)
Dept: OUTPATIENT SERVICES | Facility: HOSPITAL | Age: 69
LOS: 1 days | End: 2024-11-17
Payer: MEDICAID

## 2024-11-17 DIAGNOSIS — Z98.890 OTHER SPECIFIED POSTPROCEDURAL STATES: Chronic | ICD-10-CM

## 2024-11-17 PROCEDURE — 76377 3D RENDER W/INTRP POSTPROCES: CPT | Mod: 26

## 2024-11-17 PROCEDURE — 70551 MRI BRAIN STEM W/O DYE: CPT | Mod: 26

## 2024-11-17 PROCEDURE — 76377 3D RENDER W/INTRP POSTPROCES: CPT

## 2024-11-17 PROCEDURE — 70551 MRI BRAIN STEM W/O DYE: CPT

## 2024-11-18 DIAGNOSIS — K21.9 GASTRO-ESOPHAGEAL REFLUX DISEASE WITHOUT ESOPHAGITIS: ICD-10-CM

## 2024-11-18 DIAGNOSIS — E78.5 HYPERLIPIDEMIA, UNSPECIFIED: ICD-10-CM

## 2024-11-18 DIAGNOSIS — D64.9 ANEMIA, UNSPECIFIED: ICD-10-CM

## 2024-11-18 DIAGNOSIS — E11.9 TYPE 2 DIABETES MELLITUS WITHOUT COMPLICATIONS: ICD-10-CM

## 2024-11-18 DIAGNOSIS — Z00.00 ENCOUNTER FOR GENERAL ADULT MEDICAL EXAMINATION WITHOUT ABNORMAL FINDINGS: ICD-10-CM

## 2024-11-19 ENCOUNTER — RESULT REVIEW (OUTPATIENT)
Age: 69
End: 2024-11-19

## 2024-11-19 ENCOUNTER — OUTPATIENT (OUTPATIENT)
Dept: OUTPATIENT SERVICES | Facility: HOSPITAL | Age: 69
LOS: 1 days | End: 2024-11-19
Payer: MEDICAID

## 2024-11-19 ENCOUNTER — APPOINTMENT (OUTPATIENT)
Dept: NEUROLOGY | Facility: CLINIC | Age: 69
End: 2024-11-19

## 2024-11-19 DIAGNOSIS — Z98.890 OTHER SPECIFIED POSTPROCEDURAL STATES: Chronic | ICD-10-CM

## 2024-11-19 DIAGNOSIS — Z12.31 ENCOUNTER FOR SCREENING MAMMOGRAM FOR MALIGNANT NEOPLASM OF BREAST: ICD-10-CM

## 2024-11-19 PROCEDURE — 77063 BREAST TOMOSYNTHESIS BI: CPT | Mod: 26

## 2024-11-19 PROCEDURE — 77067 SCR MAMMO BI INCL CAD: CPT

## 2024-11-19 PROCEDURE — 77067 SCR MAMMO BI INCL CAD: CPT | Mod: 26

## 2024-11-19 PROCEDURE — 77063 BREAST TOMOSYNTHESIS BI: CPT

## 2024-11-20 DIAGNOSIS — Z12.31 ENCOUNTER FOR SCREENING MAMMOGRAM FOR MALIGNANT NEOPLASM OF BREAST: ICD-10-CM

## 2024-11-23 ENCOUNTER — OUTPATIENT (OUTPATIENT)
Dept: OUTPATIENT SERVICES | Facility: HOSPITAL | Age: 69
LOS: 1 days | End: 2024-11-23
Payer: MEDICAID

## 2024-11-23 ENCOUNTER — RESULT REVIEW (OUTPATIENT)
Age: 69
End: 2024-11-23

## 2024-11-23 DIAGNOSIS — Z98.890 OTHER SPECIFIED POSTPROCEDURAL STATES: Chronic | ICD-10-CM

## 2024-11-23 PROCEDURE — 70551 MRI BRAIN STEM W/O DYE: CPT

## 2024-11-23 PROCEDURE — 70551 MRI BRAIN STEM W/O DYE: CPT | Mod: 26

## 2024-11-24 DIAGNOSIS — F03.90 UNSPECIFIED DEMENTIA, UNSPECIFIED SEVERITY, WITHOUT BEHAVIORAL DISTURBANCE, PSYCHOTIC DISTURBANCE, MOOD DISTURBANCE, AND ANXIETY: ICD-10-CM

## 2025-01-08 ENCOUNTER — APPOINTMENT (OUTPATIENT)
Dept: GASTROENTEROLOGY | Facility: CLINIC | Age: 70
End: 2025-01-08

## 2025-03-14 NOTE — ASU DISCHARGE PLAN (ADULT/PEDIATRIC) - A. DRIVE A CAR, OPERATE POWER TOOLS OR MACHINERY
Assessment:  1. Facet arthropathy, cervical    2. Periscapular pain of left shoulder    3. Myofascial pain syndrome        Plan:  Orders Placed This Encounter   Procedures    Durable Medical Equipment     1 PNEUMATIC CERVICAL TRACTION COLLAR    FL spine and pain procedure     Standing Status:   Future     Expected Date:   3/19/2025     Expiration Date:   3/19/2029     Reason for Exam::   b/l C3-5 MBB #1     Anticoagulant hold needed?:   No     Is the patient pregnant?:   Unknown       No orders of the defined types were placed in this encounter.      My impressions and treatment recommendations were discussed in detail with the patient, who verbalized understanding and had no further questions.    49F with complaints of bilateral neck pain and increased left trapezius and periscapular pain.  No symptoms rating down upper extremities.  She is neurologically intact on exam today.  Symptoms appear to be mostly axial nature and secondary to combination of cervical facet disease and myofascial pain syndrome.  We discussed trigger point injection to the left trapezius and rhomboid musculature based on her exam.  Additionally, we will preemptively schedule her for bilateral C3-5 medial branch block followed by radiofrequency ablation of greater than 80% relief.  If she does well with trigger point injection, she can cancel MBB.    I am also going to order cervical traction collar for the patient.      [place] Prescription Drug Monitoring Program report was reviewed and was appropriate     Complete risks and benefits including bleeding, infection, tissue reaction, nerve injury and allergic reaction were discussed. The approach was demonstrated using models and literature was provided. Verbal and written consent was obtained.       Discharge instructions were provided. I personally saw and examined the patient and I agree with the above discussed plan of care.    History of Present Illness:    Fallon Cain is a 49 y.o.  female who presents to [facility] for initial evaluation of the above stated pain complaints. The patient has a past medical and chronic pain history as outlined in the assessment section. She was referred by Candis Gee, DO  [address]  [address]  [place]  [place] [address].    She is here with chief complaint of neck pain and left periscapular pain worsening over the last 2 months.  Severe over the past month.  5 out of 10.  Occasional.  Cramping, sharp, throbbing, dull/aching in nature.  Next  Symptoms are decreased with lying down and relaxation.  Increased with exercise.    Currently using NSAIDs and muscle relaxants.  She does do stretching.    She reports my relief with PT, exercise, heat/ice therapy, chiropractor manipulation.      She reports history of chronic intermittent neck pain, but over the last 2 months has had increased neck pain and periscapular pain. Denies radiation down left arm or significant strength deficit.      Review of Systems:    Review of Systems   Musculoskeletal:         DROM   Neurological:  Positive for dizziness.   Psychiatric/Behavioral:  The patient is nervous/anxious.            Past Medical History:   Diagnosis Date    Anxiety     Lung collapse     age 17       Past Surgical History:   Procedure Laterality Date    BREAST IMPLANT Bilateral      SECTION      x2    LUNG SURGERY      collapse at age 17       Family History   Problem Relation Age of Onset    Dementia Mother     Bone cancer Brother        Social History     Occupational History    Occupation: [employer]   Tobacco Use    Smoking status: Former     Types: Cigarettes    Smokeless tobacco: Never   Vaping Use    Vaping status: Former   Substance and Sexual Activity    Alcohol use: Not Currently    Drug use: Never    Sexual activity: Yes         Current Outpatient Medications:     ALPRAZolam (XANAX) 1 mg tablet, Take 1 mg by mouth 2 (two) times a day, Disp: , Rfl:      ARIPiprazole (ABILIFY) 5 mg tablet, Take 1 tablet by mouth in the morning, Disp: , Rfl:     Ascorbic Acid (vitamin C) 1000 MG tablet, Take 1,000 mg by mouth daily, Disp: , Rfl:     buPROPion (WELLBUTRIN SR) 150 mg 12 hr tablet, Take 1 tablet by mouth 2 (two) times a day, Disp: , Rfl:     Cholecalciferol 50 MCG (2000 UT) CAPS, , Disp: , Rfl:     Magnesium 100 MG CAPS, Take by mouth, Disp: , Rfl:     methocarbamol (ROBAXIN) 750 mg tablet, Take 1 tablet by mouth 2 (two) times a day, Disp: , Rfl:     methocarbamol (Robaxin-750) 750 mg tablet, Take 1 tablet (750 mg total) by mouth 2 (two) times a day as needed for muscle spasms, Disp: 60 tablet, Rfl: 1    acyclovir (ZOVIRAX) 5 % ointment, Apply topically every 4 (four) hours for 7 days (Patient taking differently: Apply topically if needed), Disp: 5 g, Rfl: 1    buPROPion (WELLBUTRIN XL) 300 mg 24 hr tablet, Take 300 mg by mouth every morning, Disp: , Rfl:     doxepin (SINEquan) 10 mg capsule, Take 10-20 mg by mouth daily at bedtime (Patient not taking: Reported on 12/7/2023), Disp: , Rfl:     fluconazole (DIFLUCAN) 150 mg tablet, TAKE 1 TABLET (ORAL) AS DIRECTED PLEASE REPEAT DOSE IN 1 WEEK IF SYMPTOMS PERSIST, Disp: , Rfl:     mupirocin (BACTROBAN) 2 % ointment, Apply topically 3 (three) times a day, Disp: 30 g, Rfl: 0    nicotine (NICODERM CQ) 21 mg/24 hr TD 24 hr patch, PLACE 2 PATCHES ON THE SKIN OVER 24 HOURS EVERY 24 HOURS, Disp: 28 patch, Rfl: 5    No Known Allergies    Physical Exam:    Ht 5' (1.524 m)   Wt 44.7 kg (98 lb 8.7 oz)   BMI 19.25 kg/m²     Constitutional: normal, well developed, well nourished, alert, in no distress and non-toxic and no overt pain behavior.  Eyes: anicteric  HEENT: grossly intact  Neck: supple, symmetric, trachea midline and no masses   Pulmonary:even and unlabored  Cardiovascular:No edema or pitting edema present  Skin:Normal without rashes or lesions and well hydrated  Psychiatric:Mood and affect appropriate  Neurologic:Cranial  Nerves II-XII grossly intact  Musculoskeletal:normal    Cervical Spine Exam    Appearance:  Normal lordosis  Palpation/Tenderness:  left trapezium tenderness  Left rhomboid tenderness  Sensory:  no sensory deficits noted  Range of Motion:  Flexion:  Moderately limited  with pain  Extension:  Minimally limited  without pain  Rotation - Left:  No limitation  without pain  Rotation - Right:  Minimally limited  with pain  Motor Strength:  Left Arm Flexion  5/5  Left Arm Extension  5/5  Right Arm Flexion  5/5  Right Arm Extension  5/5  Left Wrist Flexion  5/5  Left Wrist Extension  5/5  Left Finger Abduction  5/5  Right Finger Abduction  5/5  Left Pincer Grasp  5/5  Right Pincer Grasp  5/5  Left    5/5  Right   5/5  Reflexes:  Left Biceps:  2+   Right Biceps:  2+   Left Brachioradialis:  2+   Right Brachioradialis:  2+   Left Triceps:  2+   Right Triceps:  2+   Special Tests:  Left Spurlings:  negative      Imaging    CT THORACOLUMBAR SPINE  11/06/19     INDICATION:   Fall.     COMPARISON: CT chest, abdomen, and pelvis dated 11/4/2019     TECHNIQUE:  Contiguous axial images through the thoracic and lumbar spine were obtained. Sagittal and coronal reconstructions were performed.       Radiation dose length product (DLP) for this visit:  1102.78 mGy-cm  (accession [id number]), 1 mGy-cm  (accession [id number]).  This examination, like all CT scans performed in the [facility], was performed utilizing techniques to minimize   radiation dose exposure, including the use of iterative reconstruction and automated exposure control.       IMAGE QUALITY:  Diagnostic.     FINDINGS:     ALIGNMENT:  Normal alignment of the lumbar spine. No spondylolisthesis or spondylolysis.     VERTEBRAL BODIES:  No fracture.  No suspicious appearing osseous lesion.     DEGENERATIVE CHANGES:     Thoracic spine:  Normal      L1-2:  Normal disc height.  No herniation.  Normal facet joints.  No canal or foraminal stenosis.     L2-3:   Normal disc height.  No herniation.  Normal facet joints.  No canal or foraminal stenosis.     L3-4:  Normal disc height.  No herniation.  Facet joint arthropathy.  No canal or foraminal stenosis.     L4-5:  Mild broad-based posterior disc herniation with some omentum flavum thickening but no significant spinal canal narrowing.  Facet joint arthropathy is noted bilaterally.  No significant neural foraminal narrowing.     L5-S1:  Normal disc height.  No herniation.  Normal facet joints.  No canal or foraminal stenosis.     PARASPINAL SOFT TISSUES:  Grossly unremarkable.     IMPRESSION:     Degenerative changes as described but no evidence of acute spinal injury.      CT CERVICAL SPINE - WITHOUT CONTRAST  11/06/19     INDICATION:   Fall.     COMPARISON:  None.     TECHNIQUE:  CT examination of the cervical spine was performed without intravenous contrast.  Contiguous axial images were obtained.  Sagittal and coronal reconstructions were performed.       Radiation dose length product (DLP) for this visit: .  This examination, like all CT scans performed in the [facility], was performed utilizing techniques to minimize radiation dose exposure, including the use of iterative reconstruction   and automated exposure control.       IMAGE QUALITY:  Diagnostic.     FINDINGS:     ALIGNMENT:  Normal alignment of the cervical spine. No subluxation.     VERTEBRAL BODIES:  No acute fracture.     DEGENERATIVE CHANGES:  Significant multilevel facet joint arthropathy is noted bilaterally.  This appears most prominent at the C3/C4 and C4/C5 levels on the left and the C3/C4 and C6/C7 levels on the right.     PREVERTEBRAL AND PARASPINAL SOFT TISSUES:  Unremarkable.     THORACIC INLET:  Mild biapical paraseptal emphysematous changes and mild pleural/parenchymal scarring.        IMPRESSION:     Degenerative changes as described but no evidence of acute cervical spine injury     Other findings as above.    FL spine and  pain procedure    (Results Pending)       Orders Placed This Encounter   Procedures    Durable Medical Equipment    FL spine and pain procedure      Statement Selected

## 2025-03-20 NOTE — ED ADULT TRIAGE NOTE - HEIGHT IN CM
In the future they need to state that she is having symptoms    Previous order correct -  from yesterday's orders   160.02

## 2025-04-12 ENCOUNTER — EMERGENCY (EMERGENCY)
Facility: HOSPITAL | Age: 70
LOS: 0 days | Discharge: HOME | End: 2025-04-12
Attending: EMERGENCY MEDICINE
Payer: MEDICAID

## 2025-04-12 VITALS
RESPIRATION RATE: 18 BRPM | HEART RATE: 72 BPM | OXYGEN SATURATION: 97 % | WEIGHT: 182.98 LBS | SYSTOLIC BLOOD PRESSURE: 153 MMHG | TEMPERATURE: 98 F | DIASTOLIC BLOOD PRESSURE: 84 MMHG

## 2025-04-12 DIAGNOSIS — Z98.890 OTHER SPECIFIED POSTPROCEDURAL STATES: Chronic | ICD-10-CM

## 2025-04-12 DIAGNOSIS — R10.30 LOWER ABDOMINAL PAIN, UNSPECIFIED: ICD-10-CM

## 2025-04-12 DIAGNOSIS — E78.5 HYPERLIPIDEMIA, UNSPECIFIED: ICD-10-CM

## 2025-04-12 DIAGNOSIS — R35.0 FREQUENCY OF MICTURITION: ICD-10-CM

## 2025-04-12 DIAGNOSIS — N30.00 ACUTE CYSTITIS WITHOUT HEMATURIA: ICD-10-CM

## 2025-04-12 LAB
ALBUMIN SERPL ELPH-MCNC: 4.3 G/DL — SIGNIFICANT CHANGE UP (ref 3.5–5.2)
ALP SERPL-CCNC: 95 U/L — SIGNIFICANT CHANGE UP (ref 30–115)
ALT FLD-CCNC: 13 U/L — SIGNIFICANT CHANGE UP (ref 0–41)
ANION GAP SERPL CALC-SCNC: 10 MMOL/L — SIGNIFICANT CHANGE UP (ref 7–14)
APPEARANCE UR: CLEAR — SIGNIFICANT CHANGE UP
AST SERPL-CCNC: 21 U/L — SIGNIFICANT CHANGE UP (ref 0–41)
BASOPHILS # BLD AUTO: 0.03 K/UL — SIGNIFICANT CHANGE UP (ref 0–0.2)
BASOPHILS NFR BLD AUTO: 0.7 % — SIGNIFICANT CHANGE UP (ref 0–1)
BILIRUB SERPL-MCNC: 0.3 MG/DL — SIGNIFICANT CHANGE UP (ref 0.2–1.2)
BILIRUB UR-MCNC: NEGATIVE — SIGNIFICANT CHANGE UP
BUN SERPL-MCNC: 14 MG/DL — SIGNIFICANT CHANGE UP (ref 10–20)
CALCIUM SERPL-MCNC: 9.7 MG/DL — SIGNIFICANT CHANGE UP (ref 8.4–10.5)
CHLORIDE SERPL-SCNC: 106 MMOL/L — SIGNIFICANT CHANGE UP (ref 98–110)
CO2 SERPL-SCNC: 24 MMOL/L — SIGNIFICANT CHANGE UP (ref 17–32)
COLOR SPEC: YELLOW — SIGNIFICANT CHANGE UP
CREAT SERPL-MCNC: 1 MG/DL — SIGNIFICANT CHANGE UP (ref 0.7–1.5)
DIFF PNL FLD: NEGATIVE — SIGNIFICANT CHANGE UP
EGFR: 60 ML/MIN/1.73M2 — SIGNIFICANT CHANGE UP
EGFR: 60 ML/MIN/1.73M2 — SIGNIFICANT CHANGE UP
EOSINOPHIL # BLD AUTO: 0.17 K/UL — SIGNIFICANT CHANGE UP (ref 0–0.7)
EOSINOPHIL NFR BLD AUTO: 4 % — SIGNIFICANT CHANGE UP (ref 0–8)
GLUCOSE SERPL-MCNC: 97 MG/DL — SIGNIFICANT CHANGE UP (ref 70–99)
GLUCOSE UR QL: NEGATIVE MG/DL — SIGNIFICANT CHANGE UP
HCT VFR BLD CALC: 34.8 % — LOW (ref 37–47)
HGB BLD-MCNC: 10.5 G/DL — LOW (ref 12–16)
IMM GRANULOCYTES NFR BLD AUTO: 0.2 % — SIGNIFICANT CHANGE UP (ref 0.1–0.3)
KETONES UR-MCNC: NEGATIVE MG/DL — SIGNIFICANT CHANGE UP
LEUKOCYTE ESTERASE UR-ACNC: ABNORMAL
LIDOCAIN IGE QN: 61 U/L — HIGH (ref 7–60)
LYMPHOCYTES # BLD AUTO: 1.89 K/UL — SIGNIFICANT CHANGE UP (ref 1.2–3.4)
LYMPHOCYTES # BLD AUTO: 44 % — SIGNIFICANT CHANGE UP (ref 20.5–51.1)
MCHC RBC-ENTMCNC: 21.9 PG — LOW (ref 27–31)
MCHC RBC-ENTMCNC: 30.2 G/DL — LOW (ref 32–37)
MCV RBC AUTO: 72.7 FL — LOW (ref 81–99)
MONOCYTES # BLD AUTO: 0.46 K/UL — SIGNIFICANT CHANGE UP (ref 0.1–0.6)
MONOCYTES NFR BLD AUTO: 10.7 % — HIGH (ref 1.7–9.3)
NEUTROPHILS # BLD AUTO: 1.74 K/UL — SIGNIFICANT CHANGE UP (ref 1.4–6.5)
NEUTROPHILS NFR BLD AUTO: 40.4 % — LOW (ref 42.2–75.2)
NITRITE UR-MCNC: NEGATIVE — SIGNIFICANT CHANGE UP
NRBC BLD AUTO-RTO: 0 /100 WBCS — SIGNIFICANT CHANGE UP (ref 0–0)
PH UR: 5.5 — SIGNIFICANT CHANGE UP (ref 5–8)
PLATELET # BLD AUTO: 287 K/UL — SIGNIFICANT CHANGE UP (ref 130–400)
PMV BLD: 11.7 FL — HIGH (ref 7.4–10.4)
POTASSIUM SERPL-MCNC: 5.3 MMOL/L — HIGH (ref 3.5–5)
POTASSIUM SERPL-SCNC: 5.3 MMOL/L — HIGH (ref 3.5–5)
PROT SERPL-MCNC: 7.3 G/DL — SIGNIFICANT CHANGE UP (ref 6–8)
PROT UR-MCNC: NEGATIVE MG/DL — SIGNIFICANT CHANGE UP
RBC # BLD: 4.79 M/UL — SIGNIFICANT CHANGE UP (ref 4.2–5.4)
RBC # FLD: 15.9 % — HIGH (ref 11.5–14.5)
SODIUM SERPL-SCNC: 140 MMOL/L — SIGNIFICANT CHANGE UP (ref 135–146)
SP GR SPEC: 1.01 — SIGNIFICANT CHANGE UP (ref 1–1.03)
UROBILINOGEN FLD QL: 0.2 MG/DL — SIGNIFICANT CHANGE UP (ref 0.2–1)
WBC # BLD: 4.3 K/UL — LOW (ref 4.8–10.8)
WBC # FLD AUTO: 4.3 K/UL — LOW (ref 4.8–10.8)

## 2025-04-12 PROCEDURE — 87086 URINE CULTURE/COLONY COUNT: CPT

## 2025-04-12 PROCEDURE — 74177 CT ABD & PELVIS W/CONTRAST: CPT | Mod: 26

## 2025-04-12 PROCEDURE — 81001 URINALYSIS AUTO W/SCOPE: CPT

## 2025-04-12 PROCEDURE — 87186 SC STD MICRODIL/AGAR DIL: CPT

## 2025-04-12 PROCEDURE — 83690 ASSAY OF LIPASE: CPT

## 2025-04-12 PROCEDURE — 99285 EMERGENCY DEPT VISIT HI MDM: CPT

## 2025-04-12 PROCEDURE — 99284 EMERGENCY DEPT VISIT MOD MDM: CPT | Mod: 25

## 2025-04-12 PROCEDURE — 85025 COMPLETE CBC W/AUTO DIFF WBC: CPT

## 2025-04-12 PROCEDURE — 36415 COLL VENOUS BLD VENIPUNCTURE: CPT

## 2025-04-12 PROCEDURE — 74177 CT ABD & PELVIS W/CONTRAST: CPT | Mod: MC

## 2025-04-12 PROCEDURE — 80053 COMPREHEN METABOLIC PANEL: CPT

## 2025-04-12 PROCEDURE — 87077 CULTURE AEROBIC IDENTIFY: CPT

## 2025-04-12 RX ORDER — CEPHALEXIN 250 MG/1
1 CAPSULE ORAL
Qty: 10 | Refills: 0
Start: 2025-04-12 | End: 2025-04-16

## 2025-04-12 RX ORDER — ACETAMINOPHEN 500 MG/5ML
650 LIQUID (ML) ORAL ONCE
Refills: 0 | Status: COMPLETED | OUTPATIENT
Start: 2025-04-12 | End: 2025-04-12

## 2025-04-12 RX ADMIN — Medication 650 MILLIGRAM(S): at 10:39

## 2025-04-17 RX ORDER — LEVOFLOXACIN 25 MG/ML
1 SOLUTION ORAL
Qty: 14 | Refills: 0
Start: 2025-04-17 | End: 2025-04-23

## 2025-04-17 RX ORDER — LEVOFLOXACIN 25 MG/ML
1 SOLUTION ORAL
Qty: 7 | Refills: 0
Start: 2025-04-17 | End: 2025-04-23

## 2025-04-27 ENCOUNTER — EMERGENCY (EMERGENCY)
Facility: HOSPITAL | Age: 70
LOS: 0 days | Discharge: HOME | End: 2025-04-28
Attending: STUDENT IN AN ORGANIZED HEALTH CARE EDUCATION/TRAINING PROGRAM
Payer: MEDICAID

## 2025-04-27 VITALS
HEIGHT: 64 IN | TEMPERATURE: 98 F | HEART RATE: 89 BPM | RESPIRATION RATE: 17 BRPM | WEIGHT: 179.9 LBS | SYSTOLIC BLOOD PRESSURE: 175 MMHG | OXYGEN SATURATION: 98 % | DIASTOLIC BLOOD PRESSURE: 113 MMHG

## 2025-04-27 VITALS
DIASTOLIC BLOOD PRESSURE: 87 MMHG | SYSTOLIC BLOOD PRESSURE: 168 MMHG | RESPIRATION RATE: 16 BRPM | OXYGEN SATURATION: 98 % | HEART RATE: 79 BPM

## 2025-04-27 DIAGNOSIS — R10.30 LOWER ABDOMINAL PAIN, UNSPECIFIED: ICD-10-CM

## 2025-04-27 DIAGNOSIS — I10 ESSENTIAL (PRIMARY) HYPERTENSION: ICD-10-CM

## 2025-04-27 DIAGNOSIS — Z98.890 OTHER SPECIFIED POSTPROCEDURAL STATES: Chronic | ICD-10-CM

## 2025-04-27 DIAGNOSIS — E78.5 HYPERLIPIDEMIA, UNSPECIFIED: ICD-10-CM

## 2025-04-27 LAB
ALBUMIN SERPL ELPH-MCNC: 4 G/DL — SIGNIFICANT CHANGE UP (ref 3.5–5.2)
ALP SERPL-CCNC: 89 U/L — SIGNIFICANT CHANGE UP (ref 30–115)
ALT FLD-CCNC: 12 U/L — SIGNIFICANT CHANGE UP (ref 0–41)
ANION GAP SERPL CALC-SCNC: 8 MMOL/L — SIGNIFICANT CHANGE UP (ref 7–14)
APPEARANCE UR: CLEAR — SIGNIFICANT CHANGE UP
AST SERPL-CCNC: 31 U/L — SIGNIFICANT CHANGE UP (ref 0–41)
BASE EXCESS BLDV CALC-SCNC: 0.9 MMOL/L — SIGNIFICANT CHANGE UP (ref -2–3)
BASOPHILS # BLD AUTO: 0.02 K/UL — SIGNIFICANT CHANGE UP (ref 0–0.2)
BASOPHILS NFR BLD AUTO: 0.3 % — SIGNIFICANT CHANGE UP (ref 0–1)
BILIRUB SERPL-MCNC: <0.2 MG/DL — SIGNIFICANT CHANGE UP (ref 0.2–1.2)
BILIRUB UR-MCNC: NEGATIVE — SIGNIFICANT CHANGE UP
BUN SERPL-MCNC: 17 MG/DL — SIGNIFICANT CHANGE UP (ref 10–20)
CA-I SERPL-SCNC: 1.21 MMOL/L — SIGNIFICANT CHANGE UP (ref 1.15–1.33)
CALCIUM SERPL-MCNC: 8.9 MG/DL — SIGNIFICANT CHANGE UP (ref 8.4–10.5)
CHLORIDE SERPL-SCNC: 108 MMOL/L — SIGNIFICANT CHANGE UP (ref 98–110)
CO2 SERPL-SCNC: 24 MMOL/L — SIGNIFICANT CHANGE UP (ref 17–32)
COLOR SPEC: YELLOW — SIGNIFICANT CHANGE UP
CREAT SERPL-MCNC: 1.3 MG/DL — SIGNIFICANT CHANGE UP (ref 0.7–1.5)
DIFF PNL FLD: NEGATIVE — SIGNIFICANT CHANGE UP
EGFR: 44 ML/MIN/1.73M2 — LOW
EGFR: 44 ML/MIN/1.73M2 — LOW
EOSINOPHIL # BLD AUTO: 0.11 K/UL — SIGNIFICANT CHANGE UP (ref 0–0.7)
EOSINOPHIL NFR BLD AUTO: 1.7 % — SIGNIFICANT CHANGE UP (ref 0–8)
GAS PNL BLDV: 136 MMOL/L — SIGNIFICANT CHANGE UP (ref 136–145)
GAS PNL BLDV: SIGNIFICANT CHANGE UP
GAS PNL BLDV: SIGNIFICANT CHANGE UP
GLUCOSE SERPL-MCNC: 99 MG/DL — SIGNIFICANT CHANGE UP (ref 70–99)
GLUCOSE UR QL: NEGATIVE MG/DL — SIGNIFICANT CHANGE UP
HCO3 BLDV-SCNC: 27 MMOL/L — SIGNIFICANT CHANGE UP (ref 22–29)
HCT VFR BLD CALC: 33.1 % — LOW (ref 37–47)
HCT VFR BLDA CALC: 35 % — SIGNIFICANT CHANGE UP (ref 34.5–46.5)
HGB BLD CALC-MCNC: 11.8 G/DL — SIGNIFICANT CHANGE UP (ref 11.7–16.1)
HGB BLD-MCNC: 10.2 G/DL — LOW (ref 12–16)
IMM GRANULOCYTES NFR BLD AUTO: 0.5 % — HIGH (ref 0.1–0.3)
KETONES UR-MCNC: NEGATIVE MG/DL — SIGNIFICANT CHANGE UP
LACTATE BLDV-MCNC: 1 MMOL/L — SIGNIFICANT CHANGE UP (ref 0.5–2)
LEUKOCYTE ESTERASE UR-ACNC: NEGATIVE — SIGNIFICANT CHANGE UP
LIDOCAIN IGE QN: 49 U/L — SIGNIFICANT CHANGE UP (ref 7–60)
LYMPHOCYTES # BLD AUTO: 2.34 K/UL — SIGNIFICANT CHANGE UP (ref 1.2–3.4)
LYMPHOCYTES # BLD AUTO: 35.6 % — SIGNIFICANT CHANGE UP (ref 20.5–51.1)
MCHC RBC-ENTMCNC: 22.5 PG — LOW (ref 27–31)
MCHC RBC-ENTMCNC: 30.8 G/DL — LOW (ref 32–37)
MCV RBC AUTO: 73.1 FL — LOW (ref 81–99)
MONOCYTES # BLD AUTO: 0.69 K/UL — HIGH (ref 0.1–0.6)
MONOCYTES NFR BLD AUTO: 10.5 % — HIGH (ref 1.7–9.3)
NEUTROPHILS # BLD AUTO: 3.39 K/UL — SIGNIFICANT CHANGE UP (ref 1.4–6.5)
NEUTROPHILS NFR BLD AUTO: 51.4 % — SIGNIFICANT CHANGE UP (ref 42.2–75.2)
NITRITE UR-MCNC: NEGATIVE — SIGNIFICANT CHANGE UP
NRBC BLD AUTO-RTO: 0 /100 WBCS — SIGNIFICANT CHANGE UP (ref 0–0)
PCO2 BLDV: 46 MMHG — HIGH (ref 39–42)
PH BLDV: 7.37 — SIGNIFICANT CHANGE UP (ref 7.32–7.43)
PH UR: 7.5 — SIGNIFICANT CHANGE UP (ref 5–8)
PLATELET # BLD AUTO: 248 K/UL — SIGNIFICANT CHANGE UP (ref 130–400)
PMV BLD: 12 FL — HIGH (ref 7.4–10.4)
PO2 BLDV: 34 MMHG — SIGNIFICANT CHANGE UP (ref 25–45)
POTASSIUM BLDV-SCNC: 4.1 MMOL/L — SIGNIFICANT CHANGE UP (ref 3.5–5.1)
POTASSIUM SERPL-MCNC: 5.3 MMOL/L — HIGH (ref 3.5–5)
POTASSIUM SERPL-SCNC: 5.3 MMOL/L — HIGH (ref 3.5–5)
PROT SERPL-MCNC: 7.2 G/DL — SIGNIFICANT CHANGE UP (ref 6–8)
PROT UR-MCNC: NEGATIVE MG/DL — SIGNIFICANT CHANGE UP
RBC # BLD: 4.53 M/UL — SIGNIFICANT CHANGE UP (ref 4.2–5.4)
RBC # FLD: 16.2 % — HIGH (ref 11.5–14.5)
SAO2 % BLDV: 58.4 % — LOW (ref 67–88)
SODIUM SERPL-SCNC: 140 MMOL/L — SIGNIFICANT CHANGE UP (ref 135–146)
SP GR SPEC: 1.01 — SIGNIFICANT CHANGE UP (ref 1–1.03)
UROBILINOGEN FLD QL: 0.2 MG/DL — SIGNIFICANT CHANGE UP (ref 0.2–1)
WBC # BLD: 6.58 K/UL — SIGNIFICANT CHANGE UP (ref 4.8–10.8)
WBC # FLD AUTO: 6.58 K/UL — SIGNIFICANT CHANGE UP (ref 4.8–10.8)

## 2025-04-27 PROCEDURE — 87040 BLOOD CULTURE FOR BACTERIA: CPT

## 2025-04-27 PROCEDURE — 83605 ASSAY OF LACTIC ACID: CPT

## 2025-04-27 PROCEDURE — 87798 DETECT AGENT NOS DNA AMP: CPT

## 2025-04-27 PROCEDURE — 87801 DETECT AGNT MULT DNA AMPLI: CPT

## 2025-04-27 PROCEDURE — 99284 EMERGENCY DEPT VISIT MOD MDM: CPT | Mod: 25

## 2025-04-27 PROCEDURE — 84295 ASSAY OF SERUM SODIUM: CPT

## 2025-04-27 PROCEDURE — 87661 TRICHOMONAS VAGINALIS AMPLIF: CPT

## 2025-04-27 PROCEDURE — 71045 X-RAY EXAM CHEST 1 VIEW: CPT

## 2025-04-27 PROCEDURE — 84132 ASSAY OF SERUM POTASSIUM: CPT

## 2025-04-27 PROCEDURE — 87086 URINE CULTURE/COLONY COUNT: CPT

## 2025-04-27 PROCEDURE — 81003 URINALYSIS AUTO W/O SCOPE: CPT

## 2025-04-27 PROCEDURE — 74177 CT ABD & PELVIS W/CONTRAST: CPT | Mod: 26

## 2025-04-27 PROCEDURE — 71045 X-RAY EXAM CHEST 1 VIEW: CPT | Mod: 26

## 2025-04-27 PROCEDURE — 85014 HEMATOCRIT: CPT

## 2025-04-27 PROCEDURE — 85025 COMPLETE CBC W/AUTO DIFF WBC: CPT

## 2025-04-27 PROCEDURE — 74177 CT ABD & PELVIS W/CONTRAST: CPT | Mod: MC

## 2025-04-27 PROCEDURE — 96374 THER/PROPH/DIAG INJ IV PUSH: CPT | Mod: XU

## 2025-04-27 PROCEDURE — 82330 ASSAY OF CALCIUM: CPT

## 2025-04-27 PROCEDURE — 36415 COLL VENOUS BLD VENIPUNCTURE: CPT

## 2025-04-27 PROCEDURE — 99285 EMERGENCY DEPT VISIT HI MDM: CPT

## 2025-04-27 PROCEDURE — 85018 HEMOGLOBIN: CPT

## 2025-04-27 PROCEDURE — 80053 COMPREHEN METABOLIC PANEL: CPT

## 2025-04-27 PROCEDURE — 83690 ASSAY OF LIPASE: CPT

## 2025-04-27 PROCEDURE — 82803 BLOOD GASES ANY COMBINATION: CPT

## 2025-04-27 RX ORDER — KETOROLAC TROMETHAMINE 30 MG/ML
30 INJECTION, SOLUTION INTRAMUSCULAR; INTRAVENOUS ONCE
Refills: 0 | Status: DISCONTINUED | OUTPATIENT
Start: 2025-04-27 | End: 2025-04-27

## 2025-04-27 RX ORDER — SODIUM CHLORIDE 9 G/1000ML
1000 INJECTION, SOLUTION INTRAVENOUS ONCE
Refills: 0 | Status: COMPLETED | OUTPATIENT
Start: 2025-04-27 | End: 2025-04-27

## 2025-04-27 RX ADMIN — SODIUM CHLORIDE 1000 MILLILITER(S): 9 INJECTION, SOLUTION INTRAVENOUS at 21:25

## 2025-04-27 RX ADMIN — KETOROLAC TROMETHAMINE 30 MILLIGRAM(S): 30 INJECTION, SOLUTION INTRAMUSCULAR; INTRAVENOUS at 21:45

## 2025-04-27 RX ADMIN — KETOROLAC TROMETHAMINE 30 MILLIGRAM(S): 30 INJECTION, SOLUTION INTRAMUSCULAR; INTRAVENOUS at 21:25

## 2025-04-27 NOTE — ED ADULT NURSE NOTE - OBJECTIVE STATEMENT
Pt A&Ox4; recently completed abx course for UTI. Endorses dysuria and burning w/ urination x 1 week; and worsening lower abd pain today.

## 2025-04-27 NOTE — ED ADULT TRIAGE NOTE - BP NONINVASIVE DIASTOLIC (MM HG)
Vitals within defined limits. Fundus firm. Lochia scant. Using Tylenol/Motrin for perineal soreness with good relief. Using ice packs/tucks for comfort. Unable to void after delivery, López placed due to increased pain and swelling in perineum after first straight cath. Up SBA, dizziness now resolved. Fasting . Pt taking 500 mg Metformin per endocrine. Breastfeeding attempts,  sleepy at breast. Encouraged to call with questions/concerns. Spouse at bedside and supportive.     113

## 2025-04-27 NOTE — ED ADULT NURSE NOTE - NS ED NURSE LEVEL OF CONSCIOUSNESS ORIENTATION
How Severe Are Your Spot(S)?: moderate What Is The Reason For Today's Visit?: Full Body Skin Examination What Is The Reason For Today's Visit? (Being Monitored For X): concerning skin lesions on an annual basis Oriented - self; Oriented - place; Oriented - time

## 2025-04-27 NOTE — ED PROVIDER NOTE - CLINICAL SUMMARY MEDICAL DECISION MAKING FREE TEXT BOX
71 yo F presented to ED for eval of abdominal pain. Labs  were ordered and reviewed.  Imaging was ordered and reviewed by me. No acute pathology on CT imaging. Pelvic exam with thin white discharge, vaginitis panel sent. Appropriate medications for patient's presenting complaints were ordered and effects were reassessed.  Patient's records (prior hospital, ED visit, and/or nursing home notes if available) were reviewed.  Additional history was obtained from EMS, family, and/or PCP (where available).  Escalation to admission/observation was considered. However patient feels much better and is comfortable with discharge.  Appropriate follow-up was arranged. Return precautions discussed in detail.

## 2025-04-27 NOTE — ED PROVIDER NOTE - CARE PROVIDER_API CALL
Lani Chacon  Internal Medicine  [address] [place] [id number]  Phone: [phone]  Fax: [phone]  Established Patient  Follow Up Time: 7-10 Days

## 2025-04-27 NOTE — ED PROVIDER NOTE - OBJECTIVE STATEMENT
70 yold female to ED Pmhx Hld, Htn, Lbp, presents to Ed c/o lower abdominal/suprapubic pain x 1 week; similar pain in past dx with pelvic(?vaginitis) given abx with mild improvement; pt denies fever chills, n/v, dysuria, frequency flank pain, sob,chest pain; prior hx back pain with ?neuro stimulator placed/removed; hx uterine fibroids(?embolization); no abdominal surgeries;

## 2025-04-27 NOTE — ED PROVIDER NOTE - DIFFERENTIAL DIAGNOSIS
The differential diagnosis for patients clinical presentation includes but is not limited to: uti, pyelo, kidney stone, vaginitis Differential Diagnosis

## 2025-04-27 NOTE — ED ADULT NURSE NOTE - NSFALLRISKINTERV_ED_ALL_ED
Assistance OOB with selected safe patient handling equipment if applicable/Assistance with ambulation/Communicate fall risk and risk factors to all staff, patient, and family/Monitor gait and stability/Provide patient with walking aids/Provide visual cue: yellow wristband, yellow gown, etc/Reinforce activity limits and safety measures with patient and family/Use of alarms - bed, stretcher, chair and/or video monitoring/Call bell, personal items and telephone in reach/Instruct patient to call for assistance before getting out of bed/chair/stretcher/Non-slip footwear applied when patient is off stretcher/[place] to call system/Physically safe environment - no spills, clutter or unnecessary equipment/Purposeful Proactive Rounding/Room/bathroom lighting operational, light cord in reach

## 2025-04-27 NOTE — ED PROVIDER NOTE - ATTENDING APP SHARED VISIT CONTRIBUTION OF CARE
70-year-old female past medical history HTN, hyperlipidemia, lower back pain, fibroids, presents emergency department for abdominal/suprapubic pain for the last week.  Patient reports that she recently had a UTI admission with antibiotics with improvement in symptoms and symptoms started and over the past week.  Reports burning sensation to lower abdomen.  Denies dysuria, hematuria.  No reported fevers.  No nausea or vomiting.    CONSTITUTIONAL: NAD.   SKIN: warm, dry  HEAD: Normocephalic; atraumatic.  ENT: MMM.   NECK: Supple.  CARD: RRR.   RESP: No wheezes, rales or rhonchi.  ABD: soft nondistended +suprapubic TTP  PELVIC: Pt consented to exam, performed with JACI Uriarte. Normal appearing female genitalia, no masses/lesions/erythema. Os closed. No active bleeding. No CMT or adnexal tenderness. (+) thin white discharge noted in canal.   EXT: Normal ROM.  No lower extremity edema.   NEURO: Alert, oriented, grossly unremarkable.

## 2025-04-27 NOTE — ED ADULT TRIAGE NOTE - CHIEF COMPLAINT QUOTE
pt presented to ED c/o lower abdominal pain x1 week that worsened today. denies any n/v/d. denies any hematuria or dysuria. As per family, pt was recently on oral abx for a UTI and completed the course of abx

## 2025-04-27 NOTE — ED PROVIDER NOTE - CARE PLAN
Assessment and plan of treatment:	Plan- labs ct ua reassess   Principal Discharge DX:	Abdominal pain  Assessment and plan of treatment:	Plan- labs ct ua reassess   1

## 2025-04-27 NOTE — ED PROVIDER NOTE - PHYSICAL EXAMINATION
Constitutional: Well developed, well nourished. NAD  Head: Normocephalic, atraumatic.  Eyes: PERRL, EOMI.  ENT: No nasal discharge. Mucous membranes dry.  Neck: Supple. Painless ROM.  Cardiovascular:  Regular rate and rhythm.   Pulmonary:  Lungs clear to auscultation bilaterally   Abdominal: Soft bs+; mild diffuse lower abdominal tenderness without rebound, guarding or flank pain  Extremities. Pelvis stable. No lower extremity edema, symmetric calves.  Skin: No rashes, cyanosis.  Neuro: AAOx3. No focal neurological deficits.  Psych: Normal mood. Normal affect.

## 2025-04-27 NOTE — ED PROVIDER NOTE - PATIENT PORTAL LINK FT
You can access the FollowMyHealth Patient Portal offered by [facility] by registering at the following website: [email or web address]. By joining [employer]’s FollowMyHealth portal, you will also be able to view your health information using other applications (apps) compatible with our system.

## 2025-04-29 LAB
CULTURE RESULTS: SIGNIFICANT CHANGE UP
SPECIMEN SOURCE: SIGNIFICANT CHANGE UP

## 2025-04-30 LAB
A VAGINAE DNA VAG QL NAA+PROBE: SIGNIFICANT CHANGE UP
BVAB2 DNA VAG QL NAA+PROBE: SIGNIFICANT CHANGE UP
C ALBICANS DNA VAG QL NAA+PROBE: POSITIVE
C GLABRATA DNA VAG QL NAA+PROBE: NEGATIVE — SIGNIFICANT CHANGE UP
C KRUSEI DNA VAG QL NAA+PROBE: NEGATIVE — SIGNIFICANT CHANGE UP
C LUSITANIAE DNA VAG QL NAA+PROBE: NEGATIVE — SIGNIFICANT CHANGE UP
CANDIDA DNA VAG QL NAA+PROBE: NEGATIVE — SIGNIFICANT CHANGE UP
MEGA1 DNA VAG QL NAA+PROBE: SIGNIFICANT CHANGE UP
T VAGINALIS RRNA SPEC QL NAA+PROBE: NEGATIVE — SIGNIFICANT CHANGE UP

## 2025-05-01 RX ORDER — FLUCONAZOLE 150 MG
1 TABLET ORAL
Qty: 1 | Refills: 0
Start: 2025-05-01 | End: 2025-05-01

## 2025-05-03 LAB
CULTURE RESULTS: SIGNIFICANT CHANGE UP
CULTURE RESULTS: SIGNIFICANT CHANGE UP
SPECIMEN SOURCE: SIGNIFICANT CHANGE UP
SPECIMEN SOURCE: SIGNIFICANT CHANGE UP

## 2025-05-08 ENCOUNTER — OUTPATIENT (OUTPATIENT)
Dept: OUTPATIENT SERVICES | Facility: HOSPITAL | Age: 70
LOS: 1 days | End: 2025-05-08
Payer: MEDICAID

## 2025-05-08 ENCOUNTER — APPOINTMENT (OUTPATIENT)
Dept: INTERNAL MEDICINE | Facility: CLINIC | Age: 70
End: 2025-05-08
Payer: MEDICAID

## 2025-05-08 VITALS
HEART RATE: 70 BPM | BODY MASS INDEX: 31.92 KG/M2 | OXYGEN SATURATION: 98 % | DIASTOLIC BLOOD PRESSURE: 77 MMHG | WEIGHT: 187 LBS | SYSTOLIC BLOOD PRESSURE: 140 MMHG | HEIGHT: 64 IN | TEMPERATURE: 98 F

## 2025-05-08 DIAGNOSIS — N81.4 UTEROVAGINAL PROLAPSE, UNSPECIFIED: ICD-10-CM

## 2025-05-08 DIAGNOSIS — E11.9 TYPE 2 DIABETES MELLITUS W/OUT COMPLICATIONS: ICD-10-CM

## 2025-05-08 DIAGNOSIS — Z98.890 OTHER SPECIFIED POSTPROCEDURAL STATES: Chronic | ICD-10-CM

## 2025-05-08 DIAGNOSIS — Z00.00 ENCOUNTER FOR GENERAL ADULT MEDICAL EXAMINATION WITHOUT ABNORMAL FINDINGS: ICD-10-CM

## 2025-05-08 DIAGNOSIS — R68.89 OTHER GENERAL SYMPTOMS AND SIGNS: ICD-10-CM

## 2025-05-08 PROCEDURE — 99214 OFFICE O/P EST MOD 30 MIN: CPT

## 2025-05-08 PROCEDURE — G2211 COMPLEX E/M VISIT ADD ON: CPT | Mod: NC

## 2025-05-08 RX ORDER — METFORMIN HYDROCHLORIDE 500 MG/1
500 TABLET, COATED ORAL
Qty: 60 | Refills: 6 | Status: ACTIVE | COMMUNITY
Start: 2025-05-08 | End: 1900-01-01

## 2025-05-09 ENCOUNTER — OUTPATIENT (OUTPATIENT)
Dept: OUTPATIENT SERVICES | Facility: HOSPITAL | Age: 70
LOS: 1 days | End: 2025-05-09

## 2025-05-09 DIAGNOSIS — Z98.890 OTHER SPECIFIED POSTPROCEDURAL STATES: Chronic | ICD-10-CM

## 2025-05-09 LAB
25(OH)D3 SERPL-MCNC: 36 NG/ML
ALBUMIN SERPL ELPH-MCNC: 4.6 G/DL
ALP BLD-CCNC: 94 U/L
ALT SERPL-CCNC: 20 U/L
ANION GAP SERPL CALC-SCNC: 13 MMOL/L
AST SERPL-CCNC: 32 U/L
BASOPHILS # BLD AUTO: 0.03 K/UL
BASOPHILS NFR BLD AUTO: 0.4 %
BILIRUB SERPL-MCNC: 0.4 MG/DL
BUN SERPL-MCNC: 16 MG/DL
CALCIUM SERPL-MCNC: 9.9 MG/DL
CHLORIDE SERPL-SCNC: 100 MMOL/L
CHOLEST SERPL-MCNC: 217 MG/DL
CO2 SERPL-SCNC: 25 MMOL/L
CREAT SERPL-MCNC: 0.8 MG/DL
EGFRCR SERPLBLD CKD-EPI 2021: 79 ML/MIN/1.73M2
EOSINOPHIL # BLD AUTO: 0.05 K/UL
EOSINOPHIL NFR BLD AUTO: 0.7 %
ESTIMATED AVERAGE GLUCOSE: 123 MG/DL
GLUCOSE SERPL-MCNC: 89 MG/DL
HBA1C MFR BLD HPLC: 5.9 %
HCT VFR BLD CALC: 34.6 %
HDLC SERPL-MCNC: 62 MG/DL
HGB BLD-MCNC: 10.4 G/DL
IMM GRANULOCYTES NFR BLD AUTO: 0.3 %
LDLC SERPL-MCNC: 143 MG/DL
LYMPHOCYTES # BLD AUTO: 2.59 K/UL
LYMPHOCYTES NFR BLD AUTO: 35.6 %
MAN DIFF?: NORMAL
MCHC RBC-ENTMCNC: 21.8 PG
MCHC RBC-ENTMCNC: 30.1 G/DL
MCV RBC AUTO: 72.7 FL
MONOCYTES # BLD AUTO: 0.61 K/UL
MONOCYTES NFR BLD AUTO: 8.4 %
NEUTROPHILS # BLD AUTO: 3.97 K/UL
NEUTROPHILS NFR BLD AUTO: 54.6 %
NONHDLC SERPL-MCNC: 155 MG/DL
PLATELET # BLD AUTO: 281 K/UL
PMV BLD AUTO: 0 /100 WBCS
PMV BLD: 13 FL
POTASSIUM SERPL-SCNC: 4.9 MMOL/L
PROT SERPL-MCNC: 7.7 G/DL
RBC # BLD: 4.76 M/UL
RBC # FLD: 16.3 %
SODIUM SERPL-SCNC: 138 MMOL/L
TRIGL SERPL-MCNC: 68 MG/DL
TSH SERPL-ACNC: 0.6 UIU/ML
WBC # FLD AUTO: 7.27 K/UL

## 2025-05-12 DIAGNOSIS — Z00.00 ENCOUNTER FOR GENERAL ADULT MEDICAL EXAMINATION WITHOUT ABNORMAL FINDINGS: ICD-10-CM

## 2025-05-13 DIAGNOSIS — N81.4 UTEROVAGINAL PROLAPSE, UNSPECIFIED: ICD-10-CM

## 2025-05-13 DIAGNOSIS — R68.89 OTHER GENERAL SYMPTOMS AND SIGNS: ICD-10-CM

## 2025-05-13 DIAGNOSIS — E11.9 TYPE 2 DIABETES MELLITUS WITHOUT COMPLICATIONS: ICD-10-CM

## 2025-05-13 DIAGNOSIS — Z00.00 ENCOUNTER FOR GENERAL ADULT MEDICAL EXAMINATION WITHOUT ABNORMAL FINDINGS: ICD-10-CM

## 2025-07-03 ENCOUNTER — APPOINTMENT (OUTPATIENT)
Dept: INTERNAL MEDICINE | Facility: CLINIC | Age: 70
End: 2025-07-03

## 2025-07-08 ENCOUNTER — APPOINTMENT (OUTPATIENT)
Dept: NEUROLOGY | Facility: CLINIC | Age: 70
End: 2025-07-08
Payer: MEDICAID

## 2025-07-08 ENCOUNTER — OUTPATIENT (OUTPATIENT)
Dept: OUTPATIENT SERVICES | Facility: HOSPITAL | Age: 70
LOS: 1 days | End: 2025-07-08
Payer: MEDICAID

## 2025-07-08 VITALS
WEIGHT: 183 LBS | BODY MASS INDEX: 31.41 KG/M2 | HEART RATE: 73 BPM | SYSTOLIC BLOOD PRESSURE: 103 MMHG | OXYGEN SATURATION: 100 % | DIASTOLIC BLOOD PRESSURE: 73 MMHG

## 2025-07-08 DIAGNOSIS — Z98.890 OTHER SPECIFIED POSTPROCEDURAL STATES: Chronic | ICD-10-CM

## 2025-07-08 DIAGNOSIS — Z00.00 ENCOUNTER FOR GENERAL ADULT MEDICAL EXAMINATION WITHOUT ABNORMAL FINDINGS: ICD-10-CM

## 2025-07-08 PROCEDURE — 99215 OFFICE O/P EST HI 40 MIN: CPT

## 2025-07-08 RX ORDER — DONEPEZIL HYDROCHLORIDE 5 MG/1
5 TABLET ORAL
Qty: 30 | Refills: 2 | Status: ACTIVE | COMMUNITY
Start: 2025-07-08 | End: 1900-01-01

## 2025-07-09 ENCOUNTER — NON-APPOINTMENT (OUTPATIENT)
Age: 70
End: 2025-07-09

## 2025-07-09 NOTE — ED ADULT TRIAGE NOTE - LANGUAGE ASSISTANCE NEEDED
Please do patient's CT coronary angiogram with CT based FFR assessment at [facility] in [facility] prior to the patient's next visit see me.  
No-Patient/Caregiver offered and refused free interpretation services.

## 2025-07-10 ENCOUNTER — NON-APPOINTMENT (OUTPATIENT)
Age: 70
End: 2025-07-10

## 2025-08-05 ENCOUNTER — NON-APPOINTMENT (OUTPATIENT)
Age: 70
End: 2025-08-05

## 2025-08-08 ENCOUNTER — NON-APPOINTMENT (OUTPATIENT)
Age: 70
End: 2025-08-08